# Patient Record
Sex: FEMALE | Race: WHITE | NOT HISPANIC OR LATINO | Employment: UNEMPLOYED | URBAN - METROPOLITAN AREA
[De-identification: names, ages, dates, MRNs, and addresses within clinical notes are randomized per-mention and may not be internally consistent; named-entity substitution may affect disease eponyms.]

---

## 2020-07-08 ENCOUNTER — HOSPITAL ENCOUNTER (OUTPATIENT)
Dept: URGENT CARE | Facility: CLINIC | Age: 20
Discharge: HOME | End: 2020-07-08

## 2020-07-10 LAB — BACTERIA UR CULT: NORMAL

## 2020-07-12 LAB — SARS-COV-2 RNA SPEC QL NAA+PROBE: NOT DETECTED

## 2020-08-15 ENCOUNTER — HOSPITAL ENCOUNTER (OUTPATIENT)
Dept: URGENT CARE | Facility: CLINIC | Age: 20
Discharge: HOME | End: 2020-08-15
Attending: PHYSICIAN ASSISTANT

## 2020-08-31 ENCOUNTER — HOSPITAL ENCOUNTER (OUTPATIENT)
Dept: URGENT CARE | Facility: CLINIC | Age: 20
Discharge: HOME | End: 2020-08-31
Attending: FAMILY MEDICINE

## 2020-09-15 ENCOUNTER — HOSPITAL ENCOUNTER (OUTPATIENT)
Dept: URGENT CARE | Facility: CLINIC | Age: 20
Discharge: HOME | End: 2020-09-15
Attending: FAMILY MEDICINE

## 2020-10-06 ENCOUNTER — HOSPITAL ENCOUNTER (OUTPATIENT)
Dept: URGENT CARE | Facility: CLINIC | Age: 20
Discharge: HOME | End: 2020-10-06

## 2020-10-28 ENCOUNTER — HOSPITAL ENCOUNTER (OUTPATIENT)
Dept: URGENT CARE | Facility: CLINIC | Age: 20
Discharge: HOME | End: 2020-10-28
Attending: FAMILY MEDICINE

## 2020-11-01 LAB — SARS-COV-2 RNA SPEC QL NAA+PROBE: NOT DETECTED

## 2021-02-25 ENCOUNTER — HOSPITAL ENCOUNTER (OUTPATIENT)
Dept: LABOR AND DELIVERY | Facility: HOSPITAL | Age: 21
Discharge: HOME | End: 2021-02-25
Attending: OBSTETRICS & GYNECOLOGY

## 2021-02-25 LAB
APPEARANCE UR: ABNORMAL
BILIRUB UR QL: NEGATIVE
COLOR UR: YELLOW
CONV BACTERIA: ABNORMAL
CONV COLLECTION SOURCE (UA): ABNORMAL
CONV UROBILINOGEN IN URINE BY AUTOMATED TEST STRIP: 0.2 {EHRLICHU}/DL (ref 0.1–1)
GLUCOSE UR QL: NEGATIVE MG/DL
HGB UR QL STRIP: ABNORMAL
KETONES UR QL STRIP: 15 MG/DL
LEUKOCYTE ESTERASE UR QL STRIP: ABNORMAL
NITRITE UR QL STRIP: NEGATIVE
PH UR STRIP.AUTO: 5 [PH] (ref 5–8)
PROT UR QL: NEGATIVE MG/DL
RBC #/AREA URNS HPF: ABNORMAL /[HPF]
SP GR UR: 1.03 (ref 1–1.03)
SQUAMOUS SPT QL MICRO: ABNORMAL /[HPF]
WBC #/AREA URNS HPF: ABNORMAL /[HPF]

## 2021-02-27 LAB — BACTERIA UR CULT: NORMAL

## 2021-04-05 ENCOUNTER — HOSPITAL ENCOUNTER (OUTPATIENT)
Dept: LABOR AND DELIVERY | Facility: HOSPITAL | Age: 21
Discharge: HOME | End: 2021-04-05
Attending: OBSTETRICS & GYNECOLOGY

## 2021-04-05 LAB
APPEARANCE UR: CLEAR
BILIRUB UR QL: NEGATIVE
COLOR UR: YELLOW
CONV BACTERIA: ABNORMAL
CONV COLLECTION SOURCE (UA): ABNORMAL
CONV UROBILINOGEN IN URINE BY AUTOMATED TEST STRIP: 0.2 {EHRLICHU}/DL (ref 0.1–1)
GLUCOSE UR QL: NEGATIVE MG/DL
HGB UR QL STRIP: ABNORMAL
KETONES UR QL STRIP: NEGATIVE MG/DL
LEUKOCYTE ESTERASE UR QL STRIP: ABNORMAL
NITRITE UR QL STRIP: NEGATIVE
PH UR STRIP.AUTO: 6 [PH] (ref 5–8)
PROT UR QL: NEGATIVE MG/DL
RBC #/AREA URNS HPF: ABNORMAL /[HPF]
SP GR UR: 1.01 (ref 1–1.03)
SQUAMOUS SPT QL MICRO: ABNORMAL /[HPF]
WBC #/AREA URNS HPF: ABNORMAL /[HPF]

## 2021-04-06 LAB — BACTERIA UR CULT: NORMAL

## 2021-04-08 ENCOUNTER — HOSPITAL ENCOUNTER (OUTPATIENT)
Dept: URGENT CARE | Facility: CLINIC | Age: 21
Discharge: HOME | End: 2021-04-08
Attending: NURSE PRACTITIONER

## 2021-04-08 ENCOUNTER — HOSPITAL ENCOUNTER (OUTPATIENT)
Dept: LABOR AND DELIVERY | Facility: HOSPITAL | Age: 21
Discharge: HOME | End: 2021-04-08
Attending: OBSTETRICS & GYNECOLOGY

## 2021-04-14 ENCOUNTER — HOSPITAL ENCOUNTER (OUTPATIENT)
Dept: LABOR AND DELIVERY | Facility: HOSPITAL | Age: 21
Discharge: HOME | End: 2021-04-14
Attending: OBSTETRICS & GYNECOLOGY

## 2021-04-14 LAB
ALBUMIN SERPL-MCNC: 3.4 G/DL (ref 3.5–5)
ALBUMIN/GLOB SERPL: 1.1 {RATIO} (ref 1.4–2.6)
ALP SERPL-CCNC: 164 U/L (ref 42–98)
ALT SERPL-CCNC: 18 U/L (ref 10–40)
ANION GAP SERPL CALC-SCNC: 14 MMOL/L (ref 8–19)
AST SERPL-CCNC: 18 U/L (ref 15–50)
BASOPHILS # BLD AUTO: 0.04 10*3/UL (ref 0–0.2)
BASOPHILS NFR BLD AUTO: 0.6 % (ref 0–3)
BILIRUB SERPL-MCNC: <0.15 MG/DL (ref 0.2–1.3)
BUN SERPL-MCNC: 9 MG/DL (ref 5–25)
BUN/CREAT SERPL: 20 {RATIO} (ref 6–20)
CALCIUM SERPL-MCNC: 9.4 MG/DL (ref 8.7–10.4)
CHLORIDE SERPL-SCNC: 104 MMOL/L (ref 99–111)
CONV ABS IMM GRAN: 0.02 10*3/UL (ref 0–0.2)
CONV CO2: 23 MMOL/L (ref 22–32)
CONV CREATININE URINE, RANDOM: 89.5 MG/DL (ref 10–300)
CONV IMMATURE GRAN: 0.3 % (ref 0–1.8)
CONV PROTEIN TO CREATININE RATIO (RANDOM URINE): 0.13 {RATIO} (ref 0–0.1)
CONV TOTAL PROTEIN: 6.4 G/DL (ref 6.3–8.2)
CREAT UR-MCNC: 0.44 MG/DL (ref 0.5–0.9)
DEPRECATED RDW RBC AUTO: 44.6 FL (ref 36.4–46.3)
EOSINOPHIL # BLD AUTO: 0.18 10*3/UL (ref 0–0.7)
EOSINOPHIL # BLD AUTO: 2.7 % (ref 0–7)
ERYTHROCYTE [DISTWIDTH] IN BLOOD BY AUTOMATED COUNT: 13.7 % (ref 11.7–14.4)
GFR SERPLBLD BASED ON 1.73 SQ M-ARVRAT: >60 ML/MIN/{1.73_M2}
GLOBULIN UR ELPH-MCNC: 3 G/DL (ref 2–3.5)
GLUCOSE SERPL-MCNC: 83 MG/DL (ref 65–99)
HCT VFR BLD AUTO: 36.2 % (ref 37–47)
HGB BLD-MCNC: 11.8 G/DL (ref 12–16)
LYMPHOCYTES # BLD AUTO: 1.37 10*3/UL (ref 1–5)
LYMPHOCYTES NFR BLD AUTO: 20.3 % (ref 20–45)
MCH RBC QN AUTO: 29.1 PG (ref 27–31)
MCHC RBC AUTO-ENTMCNC: 32.6 G/DL (ref 33–37)
MCV RBC AUTO: 89.2 FL (ref 81–99)
MONOCYTES # BLD AUTO: 0.69 10*3/UL (ref 0.2–1.2)
MONOCYTES NFR BLD AUTO: 10.2 % (ref 3–10)
NEUTROPHILS # BLD AUTO: 4.46 10*3/UL (ref 2–8)
NEUTROPHILS NFR BLD AUTO: 65.9 % (ref 30–85)
NRBC CBCN: 0 % (ref 0–0.7)
OSMOLALITY SERPL CALC.SUM OF ELEC: 282 MOSM/KG (ref 273–304)
PLATELET # BLD AUTO: 204 10*3/UL (ref 130–400)
PMV BLD AUTO: 11.9 FL (ref 9.4–12.3)
POTASSIUM SERPL-SCNC: 3.9 MMOL/L (ref 3.5–5.3)
PROT UR-MCNC: 11.2 MG/DL
RBC # BLD AUTO: 4.06 10*6/UL (ref 4.2–5.4)
SODIUM SERPL-SCNC: 137 MMOL/L (ref 135–147)
WBC # BLD AUTO: 6.76 10*3/UL (ref 4.8–10.8)

## 2021-04-15 ENCOUNTER — HOSPITAL ENCOUNTER (OUTPATIENT)
Dept: LABOR AND DELIVERY | Facility: HOSPITAL | Age: 21
Discharge: HOME | End: 2021-04-15
Attending: OBSTETRICS & GYNECOLOGY

## 2021-04-15 LAB — CONV ALPHA-1-MICROGLOBULIN PLACENTAL (VAGINAL FLUID): NEGATIVE

## 2021-04-22 ENCOUNTER — HOSPITAL ENCOUNTER (OUTPATIENT)
Dept: LABOR AND DELIVERY | Facility: HOSPITAL | Age: 21
Discharge: HOME | End: 2021-04-22
Attending: OBSTETRICS & GYNECOLOGY

## 2021-04-22 LAB
ALBUMIN SERPL-MCNC: 3.2 G/DL (ref 3.5–5)
ALBUMIN/GLOB SERPL: 1.1 {RATIO} (ref 1.4–2.6)
ALP SERPL-CCNC: 174 U/L (ref 42–98)
ALT SERPL-CCNC: 24 U/L (ref 10–40)
ANION GAP SERPL CALC-SCNC: 16 MMOL/L (ref 8–19)
AST SERPL-CCNC: 27 U/L (ref 15–50)
BASOPHILS # BLD AUTO: 0.03 10*3/UL (ref 0–0.2)
BASOPHILS NFR BLD AUTO: 0.4 % (ref 0–3)
BILIRUB SERPL-MCNC: 0.17 MG/DL (ref 0.2–1.3)
BUN SERPL-MCNC: 9 MG/DL (ref 5–25)
BUN/CREAT SERPL: 19 {RATIO} (ref 6–20)
CALCIUM SERPL-MCNC: 9.5 MG/DL (ref 8.7–10.4)
CHLORIDE SERPL-SCNC: 102 MMOL/L (ref 99–111)
CONV ABS IMM GRAN: 0.02 10*3/UL (ref 0–0.2)
CONV CO2: 20 MMOL/L (ref 22–32)
CONV CREATININE URINE, RANDOM: 114 MG/DL (ref 10–300)
CONV IMMATURE GRAN: 0.3 % (ref 0–1.8)
CONV PROTEIN TO CREATININE RATIO (RANDOM URINE): 0.12 {RATIO} (ref 0–0.1)
CONV TOTAL PROTEIN: 6.1 G/DL (ref 6.3–8.2)
CREAT UR-MCNC: 0.48 MG/DL (ref 0.5–0.9)
DEPRECATED RDW RBC AUTO: 44.7 FL (ref 36.4–46.3)
EOSINOPHIL # BLD AUTO: 0.16 10*3/UL (ref 0–0.7)
EOSINOPHIL # BLD AUTO: 2.4 % (ref 0–7)
ERYTHROCYTE [DISTWIDTH] IN BLOOD BY AUTOMATED COUNT: 14 % (ref 11.7–14.4)
GFR SERPLBLD BASED ON 1.73 SQ M-ARVRAT: >60 ML/MIN/{1.73_M2}
GLOBULIN UR ELPH-MCNC: 2.9 G/DL (ref 2–3.5)
GLUCOSE SERPL-MCNC: 166 MG/DL (ref 65–99)
HCT VFR BLD AUTO: 35 % (ref 37–47)
HGB BLD-MCNC: 11.6 G/DL (ref 12–16)
LYMPHOCYTES # BLD AUTO: 1.02 10*3/UL (ref 1–5)
LYMPHOCYTES NFR BLD AUTO: 15 % (ref 20–45)
MCH RBC QN AUTO: 28.9 PG (ref 27–31)
MCHC RBC AUTO-ENTMCNC: 33.1 G/DL (ref 33–37)
MCV RBC AUTO: 87.1 FL (ref 81–99)
MONOCYTES # BLD AUTO: 0.34 10*3/UL (ref 0.2–1.2)
MONOCYTES NFR BLD AUTO: 5 % (ref 3–10)
NEUTROPHILS # BLD AUTO: 5.21 10*3/UL (ref 2–8)
NEUTROPHILS NFR BLD AUTO: 76.9 % (ref 30–85)
NRBC CBCN: 0 % (ref 0–0.7)
OSMOLALITY SERPL CALC.SUM OF ELEC: 280 MOSM/KG (ref 273–304)
PLATELET # BLD AUTO: 189 10*3/UL (ref 130–400)
PMV BLD AUTO: 11.9 FL (ref 9.4–12.3)
POTASSIUM SERPL-SCNC: 3.9 MMOL/L (ref 3.5–5.3)
PROT UR-MCNC: 13.3 MG/DL
RBC # BLD AUTO: 4.02 10*6/UL (ref 4.2–5.4)
SARS-COV-2 RNA SPEC QL NAA+PROBE: NOT DETECTED
SODIUM SERPL-SCNC: 134 MMOL/L (ref 135–147)
WBC # BLD AUTO: 6.78 10*3/UL (ref 4.8–10.8)

## 2021-06-12 PROCEDURE — 87081 CULTURE SCREEN ONLY: CPT | Performed by: NURSE PRACTITIONER

## 2021-07-04 PROCEDURE — 99283 EMERGENCY DEPT VISIT LOW MDM: CPT

## 2021-07-05 ENCOUNTER — APPOINTMENT (OUTPATIENT)
Dept: GENERAL RADIOLOGY | Facility: HOSPITAL | Age: 21
End: 2021-07-05

## 2021-07-05 ENCOUNTER — HOSPITAL ENCOUNTER (EMERGENCY)
Facility: HOSPITAL | Age: 21
Discharge: HOME | End: 2021-07-05
Attending: EMERGENCY MEDICINE | Admitting: EMERGENCY MEDICINE

## 2021-07-05 VITALS
HEART RATE: 101 BPM | SYSTOLIC BLOOD PRESSURE: 141 MMHG | DIASTOLIC BLOOD PRESSURE: 73 MMHG | BODY MASS INDEX: 35.78 KG/M2 | TEMPERATURE: 99 F | RESPIRATION RATE: 16 BRPM | HEIGHT: 63 IN | OXYGEN SATURATION: 100 %

## 2021-07-05 DIAGNOSIS — S93.402A SPRAIN OF LEFT ANKLE, UNSPECIFIED LIGAMENT, INITIAL ENCOUNTER: Primary | ICD-10-CM

## 2021-07-05 PROCEDURE — 73610 X-RAY EXAM OF ANKLE: CPT

## 2021-07-05 RX ORDER — IBUPROFEN 800 MG/1
800 TABLET ORAL EVERY 8 HOURS PRN
Qty: 20 TABLET | Refills: 0 | Status: SHIPPED | OUTPATIENT
Start: 2021-07-05 | End: 2021-08-04

## 2021-07-05 RX ORDER — TRAMADOL HYDROCHLORIDE 50 MG/1
50 TABLET ORAL ONCE
Status: COMPLETED | OUTPATIENT
Start: 2021-07-05 | End: 2021-07-05

## 2021-07-05 RX ADMIN — TRAMADOL HYDROCHLORIDE 50 MG: 50 TABLET, FILM COATED ORAL at 02:08

## 2021-07-05 NOTE — DISCHARGE INSTRUCTIONS
Rest.  Ice.  Elevate.    No weightbearing for the next 48 hours.    Wear walking boot for ambulation and use crutches after that time.  If no better follow-up with PCP for reevaluation and possible orthopedic consultation
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
## 2021-09-14 PROCEDURE — 87086 URINE CULTURE/COLONY COUNT: CPT | Performed by: FAMILY MEDICINE

## 2021-09-16 ENCOUNTER — TELEPHONE (OUTPATIENT)
Dept: OTHER | Facility: OTHER | Age: 21
End: 2021-09-16

## 2021-09-16 NOTE — TELEPHONE ENCOUNTER
----- Message from SAKINA Davis sent at 9/15/2021 10:55 PM EDT -----  Please call the patient regarding her normal result.    Patient's urine culture is negative.

## 2021-10-06 ENCOUNTER — APPOINTMENT (OUTPATIENT)
Dept: GENERAL RADIOLOGY | Facility: HOSPITAL | Age: 21
End: 2021-10-06

## 2021-10-06 ENCOUNTER — HOSPITAL ENCOUNTER (EMERGENCY)
Facility: HOSPITAL | Age: 21
Discharge: HOME | End: 2021-10-06
Attending: EMERGENCY MEDICINE | Admitting: EMERGENCY MEDICINE

## 2021-10-06 VITALS
RESPIRATION RATE: 18 BRPM | BODY MASS INDEX: 39.38 KG/M2 | SYSTOLIC BLOOD PRESSURE: 141 MMHG | HEART RATE: 100 BPM | DIASTOLIC BLOOD PRESSURE: 100 MMHG | TEMPERATURE: 98.1 F | WEIGHT: 222.22 LBS | HEIGHT: 63 IN | OXYGEN SATURATION: 97 %

## 2021-10-06 DIAGNOSIS — S80.01XA CONTUSION OF RIGHT KNEE, INITIAL ENCOUNTER: ICD-10-CM

## 2021-10-06 DIAGNOSIS — S80.211A ABRASION OF RIGHT KNEE, INITIAL ENCOUNTER: ICD-10-CM

## 2021-10-06 DIAGNOSIS — S90.31XA CONTUSION OF RIGHT FOOT, INITIAL ENCOUNTER: Primary | ICD-10-CM

## 2021-10-06 PROCEDURE — 99282 EMERGENCY DEPT VISIT SF MDM: CPT

## 2021-10-06 PROCEDURE — 73562 X-RAY EXAM OF KNEE 3: CPT

## 2021-10-06 PROCEDURE — 99281 EMR DPT VST MAYX REQ PHY/QHP: CPT

## 2021-10-06 PROCEDURE — 73630 X-RAY EXAM OF FOOT: CPT

## 2021-10-06 NOTE — ED PROVIDER NOTES
Subjective   Pt reports she got her right foot caught in a pallet at work and fell landing on her knees.       History provided by:  Patient  Foot Injury  Location:  Foot and knee  Time since incident:  3 hours  Injury: yes    Knee location:  R knee  Foot location:  R foot and dorsum of R foot  Pain details:     Quality:  Aching    Radiates to:  Does not radiate    Severity:  Mild    Onset quality:  Sudden    Duration:  3 hours    Timing:  Constant    Progression:  Improving  Chronicity:  New  Relieved by:  Nothing  Worsened by:  Flexion  Ineffective treatments:  None tried  Associated symptoms: swelling    Associated symptoms: no back pain, no decreased ROM, no fatigue, no fever, no itching, no muscle weakness, no neck pain, no numbness, no stiffness and no tingling    Risk factors: no concern for non-accidental trauma        Review of Systems   Constitutional: Negative for chills, fatigue and fever.   HENT: Negative for congestion, ear pain and sore throat.    Eyes: Negative for pain.   Respiratory: Negative for cough, chest tightness and shortness of breath.    Cardiovascular: Negative for chest pain.   Gastrointestinal: Negative for abdominal pain, diarrhea, nausea and vomiting.   Genitourinary: Negative for flank pain and hematuria.   Musculoskeletal: Negative for back pain, joint swelling, neck pain and stiffness.   Skin: Negative for itching and pallor.   Neurological: Negative for seizures and headaches.   All other systems reviewed and are negative.      Past Medical History:   Diagnosis Date   • Asthma     SEASONAL       No Known Allergies    History reviewed. No pertinent surgical history.    Family History   Problem Relation Age of Onset   • Diabetes Mother        Social History     Socioeconomic History   • Marital status: Single     Spouse name: Not on file   • Number of children: Not on file   • Years of education: Not on file   • Highest education level: Not on file   Tobacco Use   • Smoking status:  Never Smoker   • Smokeless tobacco: Never Used   Vaping Use   • Vaping Use: Never used   Substance and Sexual Activity   • Alcohol use: Not Currently   • Drug use: Never           Objective   Physical Exam  Vitals and nursing note reviewed.   Constitutional:       General: She is not in acute distress.     Appearance: Normal appearance. She is not toxic-appearing.   HENT:      Head: Normocephalic and atraumatic.      Mouth/Throat:      Mouth: Mucous membranes are moist.   Eyes:      Extraocular Movements: Extraocular movements intact.      Pupils: Pupils are equal, round, and reactive to light.   Cardiovascular:      Rate and Rhythm: Normal rate and regular rhythm.      Pulses: Normal pulses.           Dorsalis pedis pulses are 2+ on the right side.        Posterior tibial pulses are 2+ on the right side.      Heart sounds: Normal heart sounds.   Pulmonary:      Effort: Pulmonary effort is normal. No respiratory distress.      Breath sounds: Normal breath sounds.   Abdominal:      General: Abdomen is flat.      Palpations: Abdomen is soft.      Tenderness: There is no abdominal tenderness.   Musculoskeletal:         General: Normal range of motion.      Cervical back: Normal range of motion and neck supple.      Right knee: No swelling or deformity. Normal range of motion. Tenderness present. Normal pulse.      Right foot: No deformity.        Legs:         Feet:    Feet:      Right foot:      Skin integrity: Skin integrity normal.   Skin:     General: Skin is warm and dry.   Neurological:      Mental Status: She is alert and oriented to person, place, and time. Mental status is at baseline.         Procedures           ED Course                                           MDM  Number of Diagnoses or Management Options  Abrasion of right knee, initial encounter: new and requires workup  Contusion of right foot, initial encounter: new and requires workup  Contusion of right knee, initial encounter: new and requires  workup  Diagnosis management comments: I have spoken with the patient. I have explained the patient´s condition, diagnoses and treatment plan based on the information available to me at this time. I have answered the patient's questions and addressed any concerns. The patient has a good  understanding of the patient´s diagnosis, condition, and treatment plan as can be expected at this point. The vital signs have been stable. The patient´s condition is stable and appropriate for discharge from the emergency department.      The patient will pursue further outpatient evaluation with the primary care physician or other designated or consulting physician as outlined in the discharge instructions. They are agreeable to this plan of care and follow-up instructions have been explained in detail. The patient has received these instructions in written format and have expressed an understanding of the discharge instructions. The patient is aware that any significant change in condition or worsening of symptoms should prompt an immediate return to this or the closest emergency department or call to 911.       Amount and/or Complexity of Data Reviewed  Tests in the radiology section of CPT®: reviewed and ordered    Risk of Complications, Morbidity, and/or Mortality  Presenting problems: minimal  Diagnostic procedures: minimal  Management options: minimal    Patient Progress  Patient progress: stable      Final diagnoses:   Contusion of right foot, initial encounter   Abrasion of right knee, initial encounter   Contusion of right knee, initial encounter       ED Disposition  ED Disposition     ED Disposition Condition Comment    Discharge Stable           Provider, No Known  [facility]  [place] [id number]    In 3 days  As needed         Medication List      No changes were made to your prescriptions during this visit.          Josep Bro, APRN  10/06/21 0401

## 2021-10-08 ENCOUNTER — TRANSCRIBE ORDERS (OUTPATIENT)
Dept: ADMINISTRATIVE | Facility: HOSPITAL | Age: 21
End: 2021-10-08

## 2021-10-08 ENCOUNTER — LAB (OUTPATIENT)
Dept: LAB | Facility: HOSPITAL | Age: 21
End: 2021-10-08

## 2021-10-08 DIAGNOSIS — Z01.84 IMMUNITY STATUS TESTING: ICD-10-CM

## 2021-10-08 DIAGNOSIS — Z01.84 IMMUNITY STATUS TESTING: Primary | ICD-10-CM

## 2021-10-08 PROCEDURE — 86765 RUBEOLA ANTIBODY: CPT

## 2021-10-08 PROCEDURE — 36415 COLL VENOUS BLD VENIPUNCTURE: CPT

## 2021-10-08 PROCEDURE — 86787 VARICELLA-ZOSTER ANTIBODY: CPT

## 2021-10-08 PROCEDURE — 87517 HEPATITIS B DNA QUANT: CPT

## 2021-10-08 PROCEDURE — 86762 RUBELLA ANTIBODY: CPT

## 2021-10-08 PROCEDURE — 86735 MUMPS ANTIBODY: CPT

## 2021-10-09 LAB
HBV DNA SERPL NAA+PROBE-ACNC: NORMAL IU/ML
HBV DNA SERPL NAA+PROBE-LOG IU: NORMAL {LOG_IU}/ML
MEV IGG SER IA-ACNC: 19.8 AU/ML
MUV IGG SER IA-ACNC: 20.6 AU/ML
REF LAB TEST REF RANGE: NORMAL
RUBV IGG SERPL IA-ACNC: 1.49 INDEX
VZV IGG SER IA-ACNC: 465 INDEX

## 2021-12-20 ENCOUNTER — INITIAL PRENATAL (OUTPATIENT)
Dept: OBSTETRICS AND GYNECOLOGY | Facility: CLINIC | Age: 21
End: 2021-12-20

## 2021-12-20 VITALS
DIASTOLIC BLOOD PRESSURE: 77 MMHG | WEIGHT: 225 LBS | BODY MASS INDEX: 37.49 KG/M2 | HEIGHT: 65 IN | SYSTOLIC BLOOD PRESSURE: 116 MMHG

## 2021-12-20 DIAGNOSIS — O99.210 OBESITY AFFECTING PREGNANCY, ANTEPARTUM: ICD-10-CM

## 2021-12-20 DIAGNOSIS — Z34.80 SUPERVISION OF OTHER NORMAL PREGNANCY: Primary | ICD-10-CM

## 2021-12-20 PROBLEM — J45.909 ASTHMA: Status: ACTIVE | Noted: 2021-12-20

## 2021-12-20 PROBLEM — F41.1 GENERALIZED ANXIETY DISORDER: Status: ACTIVE | Noted: 2021-12-20

## 2021-12-20 PROBLEM — E66.9 OBESITY: Status: ACTIVE | Noted: 2021-12-20

## 2021-12-20 LAB
ABO GROUP BLD: NORMAL
ALBUMIN SERPL-MCNC: 4.2 G/DL (ref 3.5–5.2)
ALBUMIN/GLOB SERPL: 1.8 G/DL
ALP SERPL-CCNC: 89 U/L (ref 39–117)
ALT SERPL W P-5'-P-CCNC: 16 U/L (ref 1–33)
ANION GAP SERPL CALCULATED.3IONS-SCNC: 8.1 MMOL/L (ref 5–15)
AST SERPL-CCNC: 17 U/L (ref 1–32)
B-HCG UR QL: POSITIVE
BASOPHILS # BLD AUTO: 0.02 10*3/MM3 (ref 0–0.2)
BASOPHILS NFR BLD AUTO: 0.5 % (ref 0–1.5)
BILIRUB SERPL-MCNC: <0.2 MG/DL (ref 0–1.2)
BLD GP AB SCN SERPL QL: NEGATIVE
BUN SERPL-MCNC: 6 MG/DL (ref 6–20)
BUN/CREAT SERPL: 15.4 (ref 7–25)
C TRACH RRNA SPEC DONR QL NAA+PROBE: NEGATIVE
CALCIUM SPEC-SCNC: 9 MG/DL (ref 8.6–10.5)
CHLORIDE SERPL-SCNC: 101 MMOL/L (ref 98–107)
CO2 SERPL-SCNC: 27.9 MMOL/L (ref 22–29)
CREAT SERPL-MCNC: 0.39 MG/DL (ref 0.57–1)
DEPRECATED RDW RBC AUTO: 47.5 FL (ref 37–54)
EOSINOPHIL # BLD AUTO: 0.13 10*3/MM3 (ref 0–0.4)
EOSINOPHIL NFR BLD AUTO: 3 % (ref 0.3–6.2)
ERYTHROCYTE [DISTWIDTH] IN BLOOD BY AUTOMATED COUNT: 14.7 % (ref 12.3–15.4)
EXPIRATION DATE: ABNORMAL
GFR SERPL CREATININE-BSD FRML MDRD: >150 ML/MIN/1.73
GLOBULIN UR ELPH-MCNC: 2.3 GM/DL
GLUCOSE SERPL-MCNC: 77 MG/DL (ref 65–99)
GLUCOSE UR STRIP-MCNC: NEGATIVE MG/DL
HBV SURFACE AG SERPL QL IA: NORMAL
HCT VFR BLD AUTO: 40 % (ref 34–46.6)
HCV AB SER DONR QL: NORMAL
HGB BLD-MCNC: 12.8 G/DL (ref 12–15.9)
HIV1+2 AB SER QL: NORMAL
IMM GRANULOCYTES # BLD AUTO: 0.01 10*3/MM3 (ref 0–0.05)
IMM GRANULOCYTES NFR BLD AUTO: 0.2 % (ref 0–0.5)
INTERNAL NEGATIVE CONTROL: ABNORMAL
INTERNAL POSITIVE CONTROL: ABNORMAL
LYMPHOCYTES # BLD AUTO: 0.75 10*3/MM3 (ref 0.7–3.1)
LYMPHOCYTES NFR BLD AUTO: 17.4 % (ref 19.6–45.3)
Lab: ABNORMAL
MCH RBC QN AUTO: 28.2 PG (ref 26.6–33)
MCHC RBC AUTO-ENTMCNC: 32 G/DL (ref 31.5–35.7)
MCV RBC AUTO: 88.1 FL (ref 79–97)
MONOCYTES # BLD AUTO: 0.5 10*3/MM3 (ref 0.1–0.9)
MONOCYTES NFR BLD AUTO: 11.6 % (ref 5–12)
N GONORRHOEA DNA SPEC QL NAA+PROBE: NEGATIVE
NEUTROPHILS NFR BLD AUTO: 2.9 10*3/MM3 (ref 1.7–7)
NEUTROPHILS NFR BLD AUTO: 67.3 % (ref 42.7–76)
NRBC BLD AUTO-RTO: 0 /100 WBC (ref 0–0.2)
PLATELET # BLD AUTO: 248 10*3/MM3 (ref 140–450)
PMV BLD AUTO: 11.7 FL (ref 6–12)
POTASSIUM SERPL-SCNC: 4.1 MMOL/L (ref 3.5–5.2)
PROT SERPL-MCNC: 6.5 G/DL (ref 6–8.5)
PROT UR STRIP-MCNC: NEGATIVE MG/DL
RBC # BLD AUTO: 4.54 10*6/MM3 (ref 3.77–5.28)
RH BLD: POSITIVE
SODIUM SERPL-SCNC: 137 MMOL/L (ref 136–145)
T4 FREE SERPL-MCNC: 0.94 NG/DL (ref 0.93–1.7)
TSH SERPL DL<=0.05 MIU/L-ACNC: 1.8 UIU/ML (ref 0.27–4.2)
WBC NRBC COR # BLD: 4.31 10*3/MM3 (ref 3.4–10.8)

## 2021-12-20 PROCEDURE — 86803 HEPATITIS C AB TEST: CPT | Performed by: OBSTETRICS & GYNECOLOGY

## 2021-12-20 PROCEDURE — 83020 HEMOGLOBIN ELECTROPHORESIS: CPT | Performed by: OBSTETRICS & GYNECOLOGY

## 2021-12-20 PROCEDURE — 84443 ASSAY THYROID STIM HORMONE: CPT | Performed by: OBSTETRICS & GYNECOLOGY

## 2021-12-20 PROCEDURE — 87591 N.GONORRHOEAE DNA AMP PROB: CPT | Performed by: OBSTETRICS & GYNECOLOGY

## 2021-12-20 PROCEDURE — 84439 ASSAY OF FREE THYROXINE: CPT | Performed by: OBSTETRICS & GYNECOLOGY

## 2021-12-20 PROCEDURE — 81025 URINE PREGNANCY TEST: CPT | Performed by: OBSTETRICS & GYNECOLOGY

## 2021-12-20 PROCEDURE — 80053 COMPREHEN METABOLIC PANEL: CPT | Performed by: OBSTETRICS & GYNECOLOGY

## 2021-12-20 PROCEDURE — 87661 TRICHOMONAS VAGINALIS AMPLIF: CPT | Performed by: OBSTETRICS & GYNECOLOGY

## 2021-12-20 PROCEDURE — 87086 URINE CULTURE/COLONY COUNT: CPT | Performed by: OBSTETRICS & GYNECOLOGY

## 2021-12-20 PROCEDURE — G0123 SCREEN CERV/VAG THIN LAYER: HCPCS | Performed by: OBSTETRICS & GYNECOLOGY

## 2021-12-20 PROCEDURE — 99204 OFFICE O/P NEW MOD 45 MIN: CPT | Performed by: OBSTETRICS & GYNECOLOGY

## 2021-12-20 PROCEDURE — 87491 CHLMYD TRACH DNA AMP PROBE: CPT | Performed by: OBSTETRICS & GYNECOLOGY

## 2021-12-20 PROCEDURE — G0432 EIA HIV-1/HIV-2 SCREEN: HCPCS | Performed by: OBSTETRICS & GYNECOLOGY

## 2021-12-21 LAB — RPR SER QL: NORMAL

## 2021-12-22 LAB
BACTERIA UR CULT: NO GROWTH
BACTERIA UR CULT: NORMAL
HGB A MFR BLD ELPH: 97.8 % (ref 96.4–98.8)
HGB A2 MFR BLD ELPH: 2.2 % (ref 1.8–3.2)
HGB F MFR BLD ELPH: 0 % (ref 0–2)
HGB FRACT BLD-IMP: NORMAL
HGB S MFR BLD ELPH: 0 %
RUBV IGG SERPL IA-ACNC: 1.32 INDEX

## 2021-12-23 LAB
C TRACH RRNA CVX QL NAA+PROBE: NEGATIVE
CONV .: NORMAL
CYTOLOGIST CVX/VAG CYTO: NORMAL
CYTOLOGY CVX/VAG DOC CYTO: NORMAL
CYTOLOGY CVX/VAG DOC THIN PREP: NORMAL
DX ICD CODE: NORMAL
HIV 1 & 2 AB SER-IMP: NORMAL
N GONORRHOEA RRNA CVX QL NAA+PROBE: NEGATIVE
OTHER STN SPEC: NORMAL
STAT OF ADQ CVX/VAG CYTO-IMP: NORMAL
T VAGINALIS RRNA SPEC QL NAA+PROBE: NEGATIVE

## 2021-12-26 PROBLEM — O99.340 ANXIETY DISORDER AFFECTING PREGNANCY, ANTEPARTUM: Status: ACTIVE | Noted: 2021-12-26

## 2021-12-26 PROBLEM — O99.210 OBESITY AFFECTING PREGNANCY, ANTEPARTUM: Status: ACTIVE | Noted: 2021-12-26

## 2021-12-26 PROBLEM — F41.9 ANXIETY DISORDER AFFECTING PREGNANCY, ANTEPARTUM: Status: ACTIVE | Noted: 2021-12-26

## 2021-12-26 PROBLEM — J45.909 ASTHMA AFFECTING PREGNANCY, ANTEPARTUM: Status: ACTIVE | Noted: 2021-12-26

## 2021-12-26 PROBLEM — O99.519 ASTHMA AFFECTING PREGNANCY, ANTEPARTUM: Status: ACTIVE | Noted: 2021-12-26

## 2021-12-27 NOTE — ASSESSMENT & PLAN NOTE
Continue prenatal vitamins  Check prenatal labs  Check dating ultrasound  Discussed call schedule an office visit schedule  Discussed medications safe in pregnancy  Discussed nutrition and exercise in pregnancy  Patient is considering prenatal genetic screening  Recommend the patient complete her Covid vaccination series  Recommended flu vaccine
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
## 2021-12-27 NOTE — ASSESSMENT & PLAN NOTE
Patient symptoms are stable.  She is currently not on any medications.  We will continue to monitor.

## 2022-01-12 ENCOUNTER — TELEPHONE (OUTPATIENT)
Dept: LABOR AND DELIVERY | Facility: HOSPITAL | Age: 22
End: 2022-01-12

## 2022-01-18 ENCOUNTER — ROUTINE PRENATAL (OUTPATIENT)
Dept: OBSTETRICS AND GYNECOLOGY | Facility: CLINIC | Age: 22
End: 2022-01-18

## 2022-01-18 VITALS — BODY MASS INDEX: 37.86 KG/M2 | WEIGHT: 224 LBS | DIASTOLIC BLOOD PRESSURE: 77 MMHG | SYSTOLIC BLOOD PRESSURE: 124 MMHG

## 2022-01-18 DIAGNOSIS — O99.340 ANXIETY DISORDER AFFECTING PREGNANCY, ANTEPARTUM: ICD-10-CM

## 2022-01-18 DIAGNOSIS — F41.9 ANXIETY DISORDER AFFECTING PREGNANCY, ANTEPARTUM: ICD-10-CM

## 2022-01-18 DIAGNOSIS — Z34.80 SUPERVISION OF OTHER NORMAL PREGNANCY: Primary | ICD-10-CM

## 2022-01-18 DIAGNOSIS — F32.A DEPRESSION DURING PREGNANCY, ANTEPARTUM: ICD-10-CM

## 2022-01-18 DIAGNOSIS — O21.9 VOMITING OR NAUSEA OF PREGNANCY: ICD-10-CM

## 2022-01-18 DIAGNOSIS — O99.340 DEPRESSION DURING PREGNANCY, ANTEPARTUM: ICD-10-CM

## 2022-01-18 LAB
EXTERNAL NIPT: NEGATIVE
GLUCOSE UR STRIP-MCNC: NEGATIVE MG/DL
PROT UR STRIP-MCNC: NEGATIVE MG/DL

## 2022-01-18 PROCEDURE — 99214 OFFICE O/P EST MOD 30 MIN: CPT | Performed by: OBSTETRICS & GYNECOLOGY

## 2022-01-18 RX ORDER — CHLORAL HYDRATE 500 MG
CAPSULE ORAL EVERY 24 HOURS
COMMUNITY
End: 2022-06-07

## 2022-01-18 RX ORDER — SWAB
SWAB, NON-MEDICATED MISCELLANEOUS EVERY 24 HOURS
COMMUNITY
End: 2022-08-23

## 2022-01-18 RX ORDER — MULTIVIT WITH MINERALS/LUTEIN
TABLET ORAL EVERY 24 HOURS
COMMUNITY
End: 2022-08-23

## 2022-01-18 RX ORDER — B-COMPLEX WITH VITAMIN C
TABLET ORAL
COMMUNITY
End: 2022-06-07

## 2022-01-18 RX ORDER — ONDANSETRON 4 MG/1
4 TABLET, ORALLY DISINTEGRATING ORAL EVERY 8 HOURS PRN
Qty: 30 TABLET | Refills: 3 | Status: SHIPPED | OUTPATIENT
Start: 2022-01-18 | End: 2022-03-23

## 2022-01-18 RX ORDER — ALBUTEROL SULFATE 90 UG/1
AEROSOL, METERED RESPIRATORY (INHALATION)
COMMUNITY
Start: 2021-12-23 | End: 2022-08-23

## 2022-01-18 RX ORDER — ALBUTEROL SULFATE 2.5 MG/3ML
SOLUTION RESPIRATORY (INHALATION)
COMMUNITY
Start: 2021-12-23 | End: 2022-06-07 | Stop reason: SDUPTHER

## 2022-01-18 RX ORDER — ECHINACEA 400 MG
CAPSULE ORAL EVERY 12 HOURS SCHEDULED
COMMUNITY
End: 2022-06-07

## 2022-01-18 NOTE — PROGRESS NOTES
OB FOLLOW UP    CC: Scheduled OB routine FU       Prenatal care complicated by:   Patient Active Problem List   Diagnosis   • Asthma   • Generalized anxiety disorder   • Obesity   • Supervision of other normal pregnancy   • Obesity affecting pregnancy, antepartum   • Anxiety disorder affecting pregnancy, antepartum   • Asthma affecting pregnancy, antepartum   • COVID-19       Subjective:   Patient has: No complaints, No leaking fluid, No vaginal bleeding, No contractions  No fetal movement yet    Objective:  Urine glucose/protein- see flow sheet      /77   Wt 102 kg (224 lb)   LMP  (LMP Unknown)   BMI 37.86 kg/m²   See OB flow for LE edema, and cvx exam if applicable  FHT: 155 BPM   Uterine Size: size equals dates    Ultrasound 1/5/2022 reviewed with patient    Assessment and Plan:  Diagnoses and all orders for this visit:    1. Supervision of other normal pregnancy (Primary)  Overview:  EDC: 7/10/2022    Nips:    Asthma  Obesity    COVID-19 vaccine: Status post dose 1  Flu vaccine: Recommended  Tdap vaccine:    Assessment & Plan:  Reviewed dating ultrasound and finalized EDC.  Reviewed prenatal labs  Continue prenatal vitamins  Desires nips testing    Orders:  -     POC Urinalysis Dipstick  -     US Ob Detail Fetal Anatomy Single or First Gestation; Future  -     INVITAE NIPS    2. Depression during pregnancy, antepartum  -     sertraline (Zoloft) 50 MG tablet; Take 1 tablet by mouth Daily.  Dispense: 30 tablet; Refill: 11    3. Vomiting or nausea of pregnancy  -     ondansetron ODT (Zofran ODT) 4 MG disintegrating tablet; Place 1 tablet on the tongue Every 8 (Eight) Hours As Needed for Nausea or Vomiting.  Dispense: 30 tablet; Refill: 3    4. Anxiety disorder affecting pregnancy, antepartum  Overview:  Zoloft 50 mg daily    Assessment & Plan:  Symptoms are stable.  Continue Zoloft          15w2d  Reassuring pregnancy progress    Counseling: Second trimester precautions    Questions answered    Return in  about 5 weeks (around 2/22/2022) for Recheck.      Jluis Aragon MD  01/18/2022

## 2022-01-20 PROBLEM — U07.1 COVID-19: Status: ACTIVE | Noted: 2022-01-20

## 2022-01-21 NOTE — ASSESSMENT & PLAN NOTE
Reviewed dating ultrasound and finalized EDC.  Reviewed prenatal labs  Continue prenatal vitamins  Desires nips testing

## 2022-01-26 ENCOUNTER — TELEPHONE (OUTPATIENT)
Dept: OBSTETRICS AND GYNECOLOGY | Facility: CLINIC | Age: 22
End: 2022-01-26

## 2022-01-26 NOTE — TELEPHONE ENCOUNTER
----- Message from Jluis Aragon MD sent at 1/25/2022  9:55 PM EST -----  Notify patient of negative result and if desires male gender

## 2022-01-28 ENCOUNTER — TELEPHONE (OUTPATIENT)
Dept: OBSTETRICS AND GYNECOLOGY | Facility: CLINIC | Age: 22
End: 2022-01-28

## 2022-02-23 ENCOUNTER — ROUTINE PRENATAL (OUTPATIENT)
Dept: OBSTETRICS AND GYNECOLOGY | Facility: CLINIC | Age: 22
End: 2022-02-23

## 2022-02-23 VITALS — SYSTOLIC BLOOD PRESSURE: 115 MMHG | DIASTOLIC BLOOD PRESSURE: 64 MMHG | WEIGHT: 224.6 LBS | BODY MASS INDEX: 37.96 KG/M2

## 2022-02-23 DIAGNOSIS — J45.909 ASTHMA AFFECTING PREGNANCY, ANTEPARTUM: ICD-10-CM

## 2022-02-23 DIAGNOSIS — Z34.80 SUPERVISION OF OTHER NORMAL PREGNANCY: Primary | ICD-10-CM

## 2022-02-23 DIAGNOSIS — O99.210 OBESITY AFFECTING PREGNANCY, ANTEPARTUM: ICD-10-CM

## 2022-02-23 DIAGNOSIS — O99.519 ASTHMA AFFECTING PREGNANCY, ANTEPARTUM: ICD-10-CM

## 2022-02-23 DIAGNOSIS — F41.9 ANXIETY DISORDER AFFECTING PREGNANCY, ANTEPARTUM: ICD-10-CM

## 2022-02-23 DIAGNOSIS — O99.340 ANXIETY DISORDER AFFECTING PREGNANCY, ANTEPARTUM: ICD-10-CM

## 2022-02-23 LAB
GLUCOSE UR STRIP-MCNC: NEGATIVE MG/DL
PROT UR STRIP-MCNC: ABNORMAL MG/DL

## 2022-02-23 PROCEDURE — 99214 OFFICE O/P EST MOD 30 MIN: CPT | Performed by: OBSTETRICS & GYNECOLOGY

## 2022-03-05 NOTE — ASSESSMENT & PLAN NOTE
Reviewed today's anatomy ultrasound with patient.  No anatomical abnormalities were noted and the study was complete.  Continue prenatal vitamins  1 hour GTT next office visit

## 2022-03-23 ENCOUNTER — ROUTINE PRENATAL (OUTPATIENT)
Dept: OBSTETRICS AND GYNECOLOGY | Facility: CLINIC | Age: 22
End: 2022-03-23

## 2022-03-23 VITALS — BODY MASS INDEX: 37.86 KG/M2 | DIASTOLIC BLOOD PRESSURE: 74 MMHG | SYSTOLIC BLOOD PRESSURE: 118 MMHG | WEIGHT: 224 LBS

## 2022-03-23 DIAGNOSIS — J45.909 ASTHMA AFFECTING PREGNANCY, ANTEPARTUM: ICD-10-CM

## 2022-03-23 DIAGNOSIS — O99.519 ASTHMA AFFECTING PREGNANCY, ANTEPARTUM: ICD-10-CM

## 2022-03-23 DIAGNOSIS — O99.210 OBESITY AFFECTING PREGNANCY, ANTEPARTUM: ICD-10-CM

## 2022-03-23 DIAGNOSIS — R42 POSTURAL DIZZINESS WITH PRESYNCOPE: ICD-10-CM

## 2022-03-23 DIAGNOSIS — Z34.80 SUPERVISION OF OTHER NORMAL PREGNANCY: Primary | ICD-10-CM

## 2022-03-23 DIAGNOSIS — R55 POSTURAL DIZZINESS WITH PRESYNCOPE: ICD-10-CM

## 2022-03-23 PROBLEM — U07.1 COVID-19: Status: RESOLVED | Noted: 2022-01-20 | Resolved: 2022-03-23

## 2022-03-23 LAB
DEPRECATED RDW RBC AUTO: 47.8 FL (ref 37–54)
ERYTHROCYTE [DISTWIDTH] IN BLOOD BY AUTOMATED COUNT: 15 % (ref 12.3–15.4)
FERRITIN SERPL-MCNC: 11.7 NG/ML (ref 13–150)
GLUCOSE 1H P GLC SERPL-MCNC: 105 MG/DL (ref 65–139)
GLUCOSE UR STRIP-MCNC: NEGATIVE MG/DL
HCT VFR BLD AUTO: 36.6 % (ref 34–46.6)
HGB BLD-MCNC: 11.8 G/DL (ref 12–15.9)
IRON 24H UR-MRATE: 36 MCG/DL (ref 37–145)
IRON SATN MFR SERPL: 9 % (ref 20–50)
MCH RBC QN AUTO: 28.7 PG (ref 26.6–33)
MCHC RBC AUTO-ENTMCNC: 32.2 G/DL (ref 31.5–35.7)
MCV RBC AUTO: 89.1 FL (ref 79–97)
PLATELET # BLD AUTO: 209 10*3/MM3 (ref 140–450)
PMV BLD AUTO: 12.1 FL (ref 6–12)
PROT UR STRIP-MCNC: ABNORMAL MG/DL
RBC # BLD AUTO: 4.11 10*6/MM3 (ref 3.77–5.28)
RETICS # AUTO: 0.08 10*6/MM3 (ref 0.02–0.13)
RETICS/RBC NFR AUTO: 1.94 % (ref 0.7–1.9)
TIBC SERPL-MCNC: 399 MCG/DL (ref 298–536)
TRANSFERRIN SERPL-MCNC: 268 MG/DL (ref 200–360)
WBC NRBC COR # BLD: 7.22 10*3/MM3 (ref 3.4–10.8)

## 2022-03-23 PROCEDURE — 83540 ASSAY OF IRON: CPT | Performed by: OBSTETRICS & GYNECOLOGY

## 2022-03-23 PROCEDURE — 82950 GLUCOSE TEST: CPT | Performed by: OBSTETRICS & GYNECOLOGY

## 2022-03-23 PROCEDURE — 85027 COMPLETE CBC AUTOMATED: CPT | Performed by: OBSTETRICS & GYNECOLOGY

## 2022-03-23 PROCEDURE — 85045 AUTOMATED RETICULOCYTE COUNT: CPT | Performed by: OBSTETRICS & GYNECOLOGY

## 2022-03-23 PROCEDURE — 84466 ASSAY OF TRANSFERRIN: CPT | Performed by: OBSTETRICS & GYNECOLOGY

## 2022-03-23 PROCEDURE — 99214 OFFICE O/P EST MOD 30 MIN: CPT | Performed by: OBSTETRICS & GYNECOLOGY

## 2022-03-23 PROCEDURE — 82728 ASSAY OF FERRITIN: CPT | Performed by: OBSTETRICS & GYNECOLOGY

## 2022-03-23 NOTE — ASSESSMENT & PLAN NOTE
1 hour GTT today  Continue prenatal vitamins  Fetal kick counts   labor warnings  
Discussed exercise, nutrition and appropriate weight gain in pregnancy  
Suspect the patient's symptoms are likely pregnancy related due to blood volume redistribution to the placenta.  Plan to screen for anemia and iron deficiency.  
Symptoms are stable.  Continue albuterol MDI as needed    
Statement Selected

## 2022-03-23 NOTE — PROGRESS NOTES
OB FOLLOW UP    CC: Scheduled OB routine FU     Prenatal care complicated by:   Patient Active Problem List   Diagnosis   • Asthma   • Generalized anxiety disorder   • Obesity   • Supervision of other normal pregnancy   • Obesity affecting pregnancy, antepartum   • Anxiety disorder affecting pregnancy, antepartum   • Asthma affecting pregnancy, antepartum   • Postural dizziness with presyncope       Subjective:   Patient has: No leaking fluid, No vaginal bleeding, No contractions, Adequate FM  Complains of some occasional lightheadedness and flushing.    Objective:  Urine glucose/protein- see flow sheet      /74   Wt 102 kg (224 lb)   LMP  (LMP Unknown)   BMI 37.86 kg/m²   See OB flow for LE edema, and cvx exam if applicable  FHT: 148 BPM   Uterine Size: 24 cm      Assessment and Plan:  Diagnoses and all orders for this visit:    1. Supervision of other normal pregnancy (Primary)  Overview:  EDC: 7/10/2022    Nips: Negative    Asthma  Obesity  Anxiety    COVID-19 vaccine: Complete  Flu vaccine: Recommended  Tdap vaccine:    Assessment & Plan:  1 hour GTT today  Continue prenatal vitamins  Fetal kick counts   labor warnings    Orders:  -     POC Urinalysis Dipstick  -     Gestational Diabetes Screen 1 Hour  -     CBC (No Diff)    2. Postural dizziness with presyncope  Assessment & Plan:  Suspect the patient's symptoms are likely pregnancy related due to blood volume redistribution to the placenta.  Plan to screen for anemia and iron deficiency.    Orders:  -     Ferritin  -     Iron Profile  -     Reticulocytes    3. Obesity affecting pregnancy, antepartum  Assessment & Plan:  Discussed exercise, nutrition and appropriate weight gain in pregnancy      4. Asthma affecting pregnancy, antepartum  Overview:  Albuterol MDI    Assessment & Plan:  Symptoms are stable.  Continue albuterol MDI as needed            24w3d  Reassuring pregnancy progress    Counseling: Second trimester precautions  OB precautions,  leaking, VB, kalen gotti vs PTL/Labor    Questions answered    Return in about 4 weeks (around 4/20/2022) for Recheck.      Jluis Aragon MD  03/23/2022

## 2022-03-24 ENCOUNTER — TELEPHONE (OUTPATIENT)
Dept: OBSTETRICS AND GYNECOLOGY | Facility: CLINIC | Age: 22
End: 2022-03-24

## 2022-03-24 DIAGNOSIS — O99.019 MATERNAL ANEMIA IN PREGNANCY, ANTEPARTUM: Primary | ICD-10-CM

## 2022-03-24 RX ORDER — FERROUS SULFATE 325(65) MG
325 TABLET ORAL EVERY OTHER DAY
Qty: 30 TABLET | Refills: 2 | Status: ON HOLD | OUTPATIENT
Start: 2022-03-24 | End: 2022-07-07

## 2022-03-24 NOTE — TELEPHONE ENCOUNTER
----- Message from Jluis Aragon MD sent at 3/24/2022  7:18 AM EDT -----  Notify patient of slightly low iron levels.  Her hemoglobin is acceptable for this gestational age in pregnancy.  I have recommended starting an supplement.  I have sent a prescription for ferrous sulfate 325 mg.  The patient should take 1 tablet every other day.

## 2022-03-25 ENCOUNTER — TELEPHONE (OUTPATIENT)
Dept: OBSTETRICS AND GYNECOLOGY | Facility: CLINIC | Age: 22
End: 2022-03-25

## 2022-03-30 ENCOUNTER — TELEPHONE (OUTPATIENT)
Dept: OBSTETRICS AND GYNECOLOGY | Facility: CLINIC | Age: 22
End: 2022-03-30

## 2022-03-30 NOTE — TELEPHONE ENCOUNTER
Patient returned call- advised patient of 's recommendations and she verbalizes understanding that her prescription was sent to her pharmacy and she'll need to take 1 tablet every other day.

## 2022-04-11 ENCOUNTER — HOSPITAL ENCOUNTER (EMERGENCY)
Facility: HOSPITAL | Age: 22
Discharge: STILL ADMITTED OR ADMINISTRATIVE | End: 2022-04-11

## 2022-04-11 ENCOUNTER — HOSPITAL ENCOUNTER (OUTPATIENT)
Facility: HOSPITAL | Age: 22
Discharge: HOME | End: 2022-04-11
Attending: STUDENT IN AN ORGANIZED HEALTH CARE EDUCATION/TRAINING PROGRAM | Admitting: STUDENT IN AN ORGANIZED HEALTH CARE EDUCATION/TRAINING PROGRAM

## 2022-04-11 ENCOUNTER — APPOINTMENT (OUTPATIENT)
Dept: ULTRASOUND IMAGING | Facility: HOSPITAL | Age: 22
End: 2022-04-11

## 2022-04-11 VITALS
SYSTOLIC BLOOD PRESSURE: 118 MMHG | OXYGEN SATURATION: 98 % | WEIGHT: 225 LBS | HEIGHT: 64 IN | DIASTOLIC BLOOD PRESSURE: 61 MMHG | RESPIRATION RATE: 16 BRPM | BODY MASS INDEX: 38.41 KG/M2 | TEMPERATURE: 98.3 F | HEART RATE: 76 BPM

## 2022-04-11 PROBLEM — R31.29 OTHER MICROSCOPIC HEMATURIA: Status: ACTIVE | Noted: 2022-04-11

## 2022-04-11 LAB
BACTERIA UR QL AUTO: ABNORMAL /HPF
BILIRUB BLD-MCNC: NEGATIVE MG/DL
BILIRUB UR QL STRIP: NEGATIVE
CLARITY UR: CLEAR
CLARITY, POC: CLEAR
COLOR UR: ABNORMAL
COLOR UR: YELLOW
GLUCOSE UR STRIP-MCNC: NEGATIVE MG/DL
GLUCOSE UR STRIP-MCNC: NEGATIVE MG/DL
HGB UR QL STRIP.AUTO: ABNORMAL
HYALINE CASTS UR QL AUTO: ABNORMAL /LPF
KETONES UR QL STRIP: NEGATIVE
KETONES UR QL: NEGATIVE
LEUKOCYTE EST, POC: NEGATIVE
LEUKOCYTE ESTERASE UR QL STRIP.AUTO: NEGATIVE
NITRITE UR QL STRIP: NEGATIVE
NITRITE UR-MCNC: NEGATIVE MG/ML
PH UR STRIP.AUTO: 6 [PH] (ref 5–8)
PH UR: 5.5 [PH] (ref 5–8)
PROT UR QL STRIP: ABNORMAL
PROT UR STRIP-MCNC: NEGATIVE MG/DL
RBC # UR STRIP: ABNORMAL /HPF
RBC # UR STRIP: ABNORMAL /UL
REF LAB TEST METHOD: ABNORMAL
SP GR UR STRIP: >=1.03 (ref 1–1.03)
SP GR UR: 1.03 (ref 1–1.03)
SQUAMOUS #/AREA URNS HPF: ABNORMAL /HPF
UROBILINOGEN UR QL STRIP: ABNORMAL
UROBILINOGEN UR QL: NORMAL
WBC # UR STRIP: ABNORMAL /HPF

## 2022-04-11 PROCEDURE — 81001 URINALYSIS AUTO W/SCOPE: CPT | Performed by: STUDENT IN AN ORGANIZED HEALTH CARE EDUCATION/TRAINING PROGRAM

## 2022-04-11 PROCEDURE — 59025 FETAL NON-STRESS TEST: CPT

## 2022-04-11 PROCEDURE — 81002 URINALYSIS NONAUTO W/O SCOPE: CPT | Performed by: STUDENT IN AN ORGANIZED HEALTH CARE EDUCATION/TRAINING PROGRAM

## 2022-04-11 PROCEDURE — 76775 US EXAM ABDO BACK WALL LIM: CPT

## 2022-04-11 PROCEDURE — 99213 OFFICE O/P EST LOW 20 MIN: CPT | Performed by: STUDENT IN AN ORGANIZED HEALTH CARE EDUCATION/TRAINING PROGRAM

## 2022-04-11 PROCEDURE — G0463 HOSPITAL OUTPT CLINIC VISIT: HCPCS

## 2022-04-11 PROCEDURE — 59025 FETAL NON-STRESS TEST: CPT | Performed by: STUDENT IN AN ORGANIZED HEALTH CARE EDUCATION/TRAINING PROGRAM

## 2022-04-11 RX ORDER — ACETAMINOPHEN 500 MG
1000 TABLET ORAL ONCE
Status: COMPLETED | OUTPATIENT
Start: 2022-04-11 | End: 2022-04-11

## 2022-04-11 RX ORDER — ACETAMINOPHEN 500 MG
1000 TABLET ORAL EVERY 6 HOURS PRN
Qty: 30 TABLET | Refills: 0 | Status: ON HOLD | OUTPATIENT
Start: 2022-04-11 | End: 2022-07-07

## 2022-04-11 RX ADMIN — ACETAMINOPHEN 1000 MG: 500 TABLET ORAL at 16:51

## 2022-04-11 NOTE — NURSING NOTE
Dr. Khan notified by phone and informed of patient's arrival with c/o low back pain, bilateral side pains and no fetal movement felt since ,  at 27 1/7 weeks gestation.  States that work made her come in to be seen. 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 States that had high blood pressure with last pregnancy and Dr. Mathias told her that this next appt. On 22 to decide if needs medication for it. B/p 128/65.  Since placing on fetal monitor, audible fetal movement and states not feeling that.  Since left room, patient has pressed fetal movement marker button 11 times. Dr. Khan states to dipstick her urine specimen and give !gm of Tylenol now.

## 2022-04-12 NOTE — NON STRESS TEST
"Obstetrical Non-stress Test Interpretation     Name:  Iftikhar Russell  MRN: [id number]    21 y.o. female  at 27w1d    Indication: Lower back pain, right side pain, decreased fetal movement since  on 04/10/22.       Fetal Assessment  Fetal Movement: active  Fetal HR Assessment Method: external  Fetal HR (beats/min): 145  Fetal HR Baseline: normal range  Fetal HR Variability: moderate (amplitude range 6 to 25 bpm)  Fetal HR Accelerations: lasts at least 10 seconds (32 wks gest or less), greater than/equal to 10 bpm (32 wks gest or less)  Fetal HR Decelerations: absent  Sinusoidal Pattern Present: absent    /61 (BP Location: Left arm)   Pulse 76   Temp 98.3 °F (36.8 °C) (Oral)   Resp 16   Ht 162.6 cm (64\")   Wt 102 kg (225 lb)   LMP  (LMP Unknown)   SpO2 98%   BMI 38.62 kg/m²     Reason for test: OB Triage  Date of Test: 2022  Time frame of test:  RN NST Interpretation:        Rosalie Fuentes RN  2022  21:41 EDT  "

## 2022-04-12 NOTE — PROGRESS NOTES
OB TRIAGE NOTE        Name:  Iftikhar Russell  MRN: [id number]    21 y.o. female  at 27w1d    Chief Complaint: R side back pain; Decreased fetal movement    Subjective:Iftikhar Russell is a 21 y.o.  27w1d presenting for a one day history of right sided flank pain radiating toward groin and decreased fetal movement. She reports that she has not been feeling baby move since yesterday evening around 1030. She also endorses sharp constant right sided pain that radiates towards the groin. She reports that she was at work today () reporting these symptoms and was told to be seen in ED. She reports that she took 1 200mg tablet of ibuprofen which did not assist with pain. Reports that pain is exacerbated by laying on right side. Reports a history of urinary issues in her previous pregnancy. Denies any fevers and home. Is tolerating regular diet and oral hydration. Does endorse some burning with urination upon initiation. Denies any blood in urine. No associated nausea and vomiting during this acute onset.       ROS: No leaking fluid, No vaginal bleeding, No contractions, Decreased FM, Back pain and UTI symptoms    Objective:    Physical Exam  Constitutional:       General: She is not in acute distress.     Appearance: She is obese. She is not toxic-appearing or diaphoretic.   Cardiovascular:      Rate and Rhythm: Normal rate and regular rhythm.   Pulmonary:      Effort: Pulmonary effort is normal.      Breath sounds: Normal breath sounds.   Abdominal:      Tenderness: There is right CVA tenderness. There is no left CVA tenderness, guarding or rebound.      Comments: Gravid     Neurological:      Mental Status: She is alert and oriented to person, place, and time.   Skin:     General: Skin is warm and dry.   Psychiatric:         Mood and Affect: Mood normal.         Behavior: Behavior normal.         Thought Content: Thought content normal.         Judgment: Judgment normal.   Vitals and nursing note  reviewed.           Baseline: 145  Variability:   Moderate/Normal (amplitude 6-25 bpm)  Accelerations: Present (32 weeks+) 15 x 15 bpm  Decelerations: None  Contractions:  Not amee    Impression:  Appropriate NST for gestational age      Cervical exam: deferred     Lab Results   Component Value Date    WBC 7.22 03/23/2022    HGB 11.8 (L) 03/23/2022    HCT 36.6 03/23/2022    MCV 89.1 03/23/2022     03/23/2022      Lab Results   Component Value Date    GLUCOSE 77 12/20/2021    BUN 6 12/20/2021    CREATININE 0.39 (L) 12/20/2021    EGFRIFNONA >150 12/20/2021    BCR 15.4 12/20/2021    K 4.1 12/20/2021    CO2 27.9 12/20/2021    CALCIUM 9.0 12/20/2021    ALBUMIN 4.20 12/20/2021    LABIL2 1.2 (L) 04/24/2021    AST 17 12/20/2021    ALT 16 12/20/2021        Clean catch urine w/ moderate blood no WBC; straight cath with 0-2 RBCs  Kidney U/S unremarkable, no hydronephrosis or kidney stone visualized     Assessment:  Abdominal pain in pregnancy     Plan:  Differential diagnosis includes kidney stone, round ligament discomfort   PO hydration, strain urine; Tylenol 1 gram every 6 hours as needed  Pelvic rest   No lifting > 25 lbs  Pregnancy support belt   Close follow up in office     Electronically signed by Clement Khan MD, 04/11/22, 8:39 PM EDT.

## 2022-04-12 NOTE — NURSING NOTE
Dr. Khan notified by phone and informed that patient is very uncomfortable when turning to right side.Had 1 gm of Tylenol at 1705.Feeling fetal movement now. New order noted for straight catheterization to determine if blood in urine.

## 2022-04-19 ENCOUNTER — ROUTINE PRENATAL (OUTPATIENT)
Dept: OBSTETRICS AND GYNECOLOGY | Facility: CLINIC | Age: 22
End: 2022-04-19

## 2022-04-19 VITALS — DIASTOLIC BLOOD PRESSURE: 82 MMHG | SYSTOLIC BLOOD PRESSURE: 132 MMHG | WEIGHT: 223 LBS | BODY MASS INDEX: 38.28 KG/M2

## 2022-04-19 DIAGNOSIS — F41.9 ANXIETY DISORDER AFFECTING PREGNANCY, ANTEPARTUM: ICD-10-CM

## 2022-04-19 DIAGNOSIS — O26.13 LOW WEIGHT GAIN DURING PREGNANCY IN THIRD TRIMESTER: ICD-10-CM

## 2022-04-19 DIAGNOSIS — R31.29 OTHER MICROSCOPIC HEMATURIA: ICD-10-CM

## 2022-04-19 DIAGNOSIS — O99.340 ANXIETY DISORDER AFFECTING PREGNANCY, ANTEPARTUM: ICD-10-CM

## 2022-04-19 DIAGNOSIS — O99.210 OBESITY AFFECTING PREGNANCY, ANTEPARTUM: ICD-10-CM

## 2022-04-19 DIAGNOSIS — J45.909 ASTHMA AFFECTING PREGNANCY, ANTEPARTUM: ICD-10-CM

## 2022-04-19 DIAGNOSIS — O99.519 ASTHMA AFFECTING PREGNANCY, ANTEPARTUM: ICD-10-CM

## 2022-04-19 DIAGNOSIS — Z34.80 SUPERVISION OF OTHER NORMAL PREGNANCY, ANTEPARTUM: Primary | ICD-10-CM

## 2022-04-19 LAB
GLUCOSE UR STRIP-MCNC: NEGATIVE MG/DL
PROT UR STRIP-MCNC: NEGATIVE MG/DL

## 2022-04-19 PROCEDURE — 99214 OFFICE O/P EST MOD 30 MIN: CPT | Performed by: OBSTETRICS & GYNECOLOGY

## 2022-04-19 NOTE — PROGRESS NOTES
OB FOLLOW UP    CC: Scheduled OB routine FU     Prenatal care complicated by:   Patient Active Problem List   Diagnosis   • Asthma   • Generalized anxiety disorder   • Obesity   • Supervision of other normal pregnancy, antepartum   • Obesity affecting pregnancy, antepartum   • Anxiety disorder affecting pregnancy, antepartum   • Asthma affecting pregnancy, antepartum   • Postural dizziness with presyncope   • Other microscopic hematuria       Subjective:   Patient has: No complaints, No leaking fluid, No vaginal bleeding, No contractions, Adequate FM  Patient reports that she passed multiple kidney stones about 10 days ago.  Feeling better now.    Objective:  Urine glucose/protein- see flow sheet      /82   Wt 101 kg (223 lb)   LMP  (LMP Unknown)   BMI 38.28 kg/m²   See OB flow for LE edema, and cvx exam if applicable  FHT: 154 BPM   Uterine Size: 28 cm      Assessment and Plan:  Diagnoses and all orders for this visit:    1. Supervision of other normal pregnancy, antepartum (Primary)  Overview:  EDC: 7/10/2022    Nips: Negative    Asthma  Obesity  Anxiety    COVID-19 vaccine: Complete  Flu vaccine: Recommended  Tdap vaccine:    Assessment & Plan:  Continue prenatal vitamins  Fetal kick counts   labor warnings    Orders:  -     POC Urinalysis Dipstick    2. Other microscopic hematuria  Overview:  2022 r sided flank pain, acute onset, urine dip moderate blood, straight cath 0-2 RBC; no WBC. Renal U/S unremarkable ; suspect small stone vs. Bladder irritation in pregnancy; strain urine. Tylenol as needed.       3. Low weight gain during pregnancy in third trimester  -      Ob 14 + Weeks Single or First Gestation; Future    4. Anxiety disorder affecting pregnancy, antepartum  Overview:  Zoloft 50 mg daily    Assessment & Plan:  Stable.  Continue Zoloft.      5. Asthma affecting pregnancy, antepartum  Overview:  Albuterol MDI    Assessment & Plan:  Stable.  Continue albuterol MDI.      6. Obesity  affecting pregnancy, antepartum  Assessment & Plan:  Discussed exercise, nutrition and appropriate weight gain in pregnancy          28w2d  Reassuring pregnancy progress    Counseling: OB precautions, leaking, VB, kalen gotti vs PTL/Labor  [facility]    Questions answered    Return in about 2 weeks (around 5/3/2022) for Recheck.      Jluis Aragon MD  04/19/2022

## 2022-04-25 ENCOUNTER — HOSPITAL ENCOUNTER (OUTPATIENT)
Facility: HOSPITAL | Age: 22
Discharge: HOME | End: 2022-04-25
Attending: STUDENT IN AN ORGANIZED HEALTH CARE EDUCATION/TRAINING PROGRAM | Admitting: STUDENT IN AN ORGANIZED HEALTH CARE EDUCATION/TRAINING PROGRAM

## 2022-04-25 ENCOUNTER — HOSPITAL ENCOUNTER (OUTPATIENT)
Facility: HOSPITAL | Age: 22
End: 2022-04-25
Attending: STUDENT IN AN ORGANIZED HEALTH CARE EDUCATION/TRAINING PROGRAM | Admitting: STUDENT IN AN ORGANIZED HEALTH CARE EDUCATION/TRAINING PROGRAM

## 2022-04-25 VITALS
RESPIRATION RATE: 18 BRPM | HEART RATE: 87 BPM | BODY MASS INDEX: 39.62 KG/M2 | HEIGHT: 63 IN | DIASTOLIC BLOOD PRESSURE: 61 MMHG | SYSTOLIC BLOOD PRESSURE: 114 MMHG | WEIGHT: 223.6 LBS | TEMPERATURE: 98.4 F | OXYGEN SATURATION: 100 %

## 2022-04-25 LAB
BILIRUB BLD-MCNC: NEGATIVE MG/DL
CLARITY, POC: CLEAR
COLOR UR: YELLOW
GLUCOSE UR STRIP-MCNC: NEGATIVE MG/DL
KETONES UR QL: NEGATIVE
LEUKOCYTE EST, POC: NEGATIVE
NITRITE UR-MCNC: NEGATIVE MG/ML
PH UR: 6.5 [PH] (ref 5–8)
PROT UR STRIP-MCNC: NEGATIVE MG/DL
RBC # UR STRIP: ABNORMAL /UL
SP GR UR: 1.03 (ref 1–1.03)
UROBILINOGEN UR QL: NORMAL

## 2022-04-25 PROCEDURE — G0463 HOSPITAL OUTPT CLINIC VISIT: HCPCS

## 2022-04-25 PROCEDURE — 59025 FETAL NON-STRESS TEST: CPT | Performed by: STUDENT IN AN ORGANIZED HEALTH CARE EDUCATION/TRAINING PROGRAM

## 2022-04-25 PROCEDURE — 59025 FETAL NON-STRESS TEST: CPT

## 2022-04-25 PROCEDURE — 81002 URINALYSIS NONAUTO W/O SCOPE: CPT | Performed by: STUDENT IN AN ORGANIZED HEALTH CARE EDUCATION/TRAINING PROGRAM

## 2022-04-26 NOTE — DISCHARGE INSTR - ACTIVITY
Weight lifting restrictions, no lifting greater than 25 pounds, pelvic rest until further instructed by the doctor.

## 2022-04-26 NOTE — NON STRESS TEST
"Obstetrical Non-stress Test Interpretation     Name:  Iftikhar Russell  MRN: [id number]    21 y.o. female  at 29w1d    Indication: vaginal bleeding      Fetal Assessment  Fetal Movement: active  Fetal HR Assessment Method: external  Fetal HR (beats/min): 135  Fetal HR Baseline: normal range  Fetal HR Variability: moderate (amplitude range 6 to 25 bpm)  Fetal HR Accelerations: episodic, greater than/equal to 15 bpm  Fetal HR Decelerations: absent  Sinusoidal Pattern Present: absent    /61 (BP Location: Right arm, Patient Position: Sitting)   Pulse 87   Temp 98.4 °F (36.9 °C) (Oral)   Resp 18   Ht 160 cm (63\")   Wt 101 kg (223 lb 9.6 oz)   LMP  (LMP Unknown)   SpO2 100%   BMI 39.61 kg/m²     Reason for test:  vaginal bleeding  Date of Test: 2022  Time frame of test:6549-8136  RN NST Interpretation:  reactive for gestational age      Janelle Smith RN  2022  21:26 EDT  "

## 2022-04-26 NOTE — NURSING NOTE
29.1 weeks presents with c/o while working at her job at the  that she went to the bathroom and when she wiped there was bright red blood on the tissue and a drop dripped in the toilet, pt. 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 Denies leakage of fluid, states +fm noted,  No ctx: or uterine irrit. noted on monitor,abd soft, non-tender with palpation, no flank pain noted, pt. Denies any burning, urgency or frequency of urination, sve ext  Os FT, int Os closed, 40-50, -4 posterior, no blood noted on exam glove, pt. Provided po hydration,  notified of the above orders received to give po hydration, get reactive NST,then d/c home with pelvic rest and lifting restrictions, nothing over 25 lbs, pt. To follow up in office.

## 2022-05-02 ENCOUNTER — ROUTINE PRENATAL (OUTPATIENT)
Dept: OBSTETRICS AND GYNECOLOGY | Facility: CLINIC | Age: 22
End: 2022-05-02

## 2022-05-02 VITALS — DIASTOLIC BLOOD PRESSURE: 73 MMHG | WEIGHT: 220 LBS | BODY MASS INDEX: 38.97 KG/M2 | SYSTOLIC BLOOD PRESSURE: 117 MMHG

## 2022-05-02 DIAGNOSIS — O99.210 OBESITY AFFECTING PREGNANCY, ANTEPARTUM: ICD-10-CM

## 2022-05-02 DIAGNOSIS — O99.340 ANXIETY DISORDER AFFECTING PREGNANCY, ANTEPARTUM: ICD-10-CM

## 2022-05-02 DIAGNOSIS — O99.519 ASTHMA AFFECTING PREGNANCY, ANTEPARTUM: ICD-10-CM

## 2022-05-02 DIAGNOSIS — R31.29 OTHER MICROSCOPIC HEMATURIA: ICD-10-CM

## 2022-05-02 DIAGNOSIS — F41.9 ANXIETY DISORDER AFFECTING PREGNANCY, ANTEPARTUM: ICD-10-CM

## 2022-05-02 DIAGNOSIS — J45.909 ASTHMA AFFECTING PREGNANCY, ANTEPARTUM: ICD-10-CM

## 2022-05-02 DIAGNOSIS — Z34.80 SUPERVISION OF OTHER NORMAL PREGNANCY, ANTEPARTUM: Primary | ICD-10-CM

## 2022-05-02 DIAGNOSIS — Z3A.30 30 WEEKS GESTATION OF PREGNANCY: ICD-10-CM

## 2022-05-02 LAB
GLUCOSE UR STRIP-MCNC: NEGATIVE MG/DL
LEUKOCYTE EST, POC: NEGATIVE
NITRITE UR-MCNC: NEGATIVE MG/ML
PROT UR STRIP-MCNC: NEGATIVE MG/DL

## 2022-05-02 PROCEDURE — 99213 OFFICE O/P EST LOW 20 MIN: CPT | Performed by: NURSE PRACTITIONER

## 2022-05-02 NOTE — PROGRESS NOTES
OB FOLLOW UP        Chief Complaint   Patient presents with   • Routine Prenatal Visit     30 weeks        Subjective:   • Work stress related to being asked to work overtime    Objective:  /73   Wt 99.8 kg (220 lb)   LMP  (LMP Unknown)   BMI 38.97 kg/m²   Uterine Size: size equals dates  FHT: 110-160 BPM    See OB flow for LE edema, cvx exam if performed, and Upro/Uglu    Assessment and Plan:  30w1d  Reassuring pregnancy progress.  Questions answered.  Diagnoses and all orders for this visit:    1. Supervision of other normal pregnancy, antepartum (Primary)  Overview:  EDC: 7/10/2022    Nips: Negative    Asthma  Obesity  Anxiety    COVID-19 vaccine: Complete  Flu vaccine: Recommended  Tdap vaccine:    Orders:  -     POC Urinalysis Dipstick    2. Other microscopic hematuria  Overview:  4/11/2022 r sided flank pain, acute onset, urine dip moderate blood, straight cath 0-2 RBC; no WBC. Renal U/S unremarkable ; suspect small stone vs. Bladder irritation in pregnancy; strain urine. Tylenol as needed.       3. 30 weeks gestation of pregnancy    4. Anxiety disorder affecting pregnancy, antepartum  Overview:  Zoloft 50 mg daily  5/2/22 - increased work stress due to being asked to work overtime, note given for 8 hours/day.    Assessment & Plan:  Note given for 8 hours/day      5. Obesity affecting pregnancy, antepartum  Assessment & Plan:  Growth US next visit      6. Asthma affecting pregnancy, antepartum  Overview:  Albuterol MDI      Counseling:    • OB precautions, leaking, VB, kalen gotti vs PTL/Labor  • [facility]  • Work note given for 8 hours/day  • Continue PNV.  Importance of healthy eating and exercise.  • Growth ultrasound next visit    Return in 16 days (on 5/18/2022) for OB follow up, Dr. Khan.            Trav Acosta, APRN  05/02/2022    [facility]  [facility]  [address] [address]  Dept: [phone]  Loc: [phone]

## 2022-05-12 ENCOUNTER — HOSPITAL ENCOUNTER (EMERGENCY)
Facility: HOSPITAL | Age: 22
Discharge: HOME | End: 2022-05-12
Attending: EMERGENCY MEDICINE | Admitting: EMERGENCY MEDICINE

## 2022-05-12 VITALS
HEART RATE: 95 BPM | TEMPERATURE: 98.1 F | HEIGHT: 63 IN | OXYGEN SATURATION: 98 % | DIASTOLIC BLOOD PRESSURE: 65 MMHG | RESPIRATION RATE: 17 BRPM | BODY MASS INDEX: 39.49 KG/M2 | WEIGHT: 222.88 LBS | SYSTOLIC BLOOD PRESSURE: 106 MMHG

## 2022-05-12 DIAGNOSIS — M62.838 MUSCLE SPASM: Primary | ICD-10-CM

## 2022-05-12 LAB
ALBUMIN SERPL-MCNC: 3.4 G/DL (ref 3.5–5.2)
ALBUMIN/GLOB SERPL: 1.1 G/DL
ALP SERPL-CCNC: 115 U/L (ref 39–117)
ALT SERPL W P-5'-P-CCNC: 18 U/L (ref 1–33)
ANION GAP SERPL CALCULATED.3IONS-SCNC: 12.4 MMOL/L (ref 5–15)
AST SERPL-CCNC: 18 U/L (ref 1–32)
BACTERIA UR QL AUTO: NORMAL /HPF
BASOPHILS # BLD AUTO: 0.03 10*3/MM3 (ref 0–0.2)
BASOPHILS NFR BLD AUTO: 0.4 % (ref 0–1.5)
BILIRUB SERPL-MCNC: 0.2 MG/DL (ref 0–1.2)
BILIRUB UR QL STRIP: NEGATIVE
BUN SERPL-MCNC: 5 MG/DL (ref 6–20)
BUN/CREAT SERPL: 11.1 (ref 7–25)
CALCIUM SPEC-SCNC: 9.3 MG/DL (ref 8.6–10.5)
CHLORIDE SERPL-SCNC: 102 MMOL/L (ref 98–107)
CLARITY UR: CLEAR
CO2 SERPL-SCNC: 19.6 MMOL/L (ref 22–29)
COLOR UR: YELLOW
CREAT SERPL-MCNC: 0.45 MG/DL (ref 0.57–1)
DEPRECATED RDW RBC AUTO: 49.6 FL (ref 37–54)
EGFRCR SERPLBLD CKD-EPI 2021: 138.8 ML/MIN/1.73
EOSINOPHIL # BLD AUTO: 0.22 10*3/MM3 (ref 0–0.4)
EOSINOPHIL NFR BLD AUTO: 3.2 % (ref 0.3–6.2)
ERYTHROCYTE [DISTWIDTH] IN BLOOD BY AUTOMATED COUNT: 15 % (ref 12.3–15.4)
FLUAV AG NPH QL: NEGATIVE
FLUBV AG NPH QL IA: NEGATIVE
GLOBULIN UR ELPH-MCNC: 3.2 GM/DL
GLUCOSE SERPL-MCNC: 89 MG/DL (ref 65–99)
GLUCOSE UR STRIP-MCNC: NEGATIVE MG/DL
HCG INTACT+B SERPL-ACNC: 9608 MIU/ML
HCT VFR BLD AUTO: 36.5 % (ref 34–46.6)
HGB BLD-MCNC: 11.7 G/DL (ref 12–15.9)
HGB UR QL STRIP.AUTO: ABNORMAL
HOLD SPECIMEN: NORMAL
HOLD SPECIMEN: NORMAL
HYALINE CASTS UR QL AUTO: NORMAL /LPF
IMM GRANULOCYTES # BLD AUTO: 0.05 10*3/MM3 (ref 0–0.05)
IMM GRANULOCYTES NFR BLD AUTO: 0.7 % (ref 0–0.5)
KETONES UR QL STRIP: NEGATIVE
LEUKOCYTE ESTERASE UR QL STRIP.AUTO: NEGATIVE
LIPASE SERPL-CCNC: 17 U/L (ref 13–60)
LYMPHOCYTES # BLD AUTO: 1 10*3/MM3 (ref 0.7–3.1)
LYMPHOCYTES NFR BLD AUTO: 14.5 % (ref 19.6–45.3)
MCH RBC QN AUTO: 28.8 PG (ref 26.6–33)
MCHC RBC AUTO-ENTMCNC: 32.1 G/DL (ref 31.5–35.7)
MCV RBC AUTO: 89.9 FL (ref 79–97)
MONOCYTES # BLD AUTO: 0.48 10*3/MM3 (ref 0.1–0.9)
MONOCYTES NFR BLD AUTO: 7 % (ref 5–12)
NEUTROPHILS NFR BLD AUTO: 5.12 10*3/MM3 (ref 1.7–7)
NEUTROPHILS NFR BLD AUTO: 74.2 % (ref 42.7–76)
NITRITE UR QL STRIP: NEGATIVE
NRBC BLD AUTO-RTO: 0 /100 WBC (ref 0–0.2)
PH UR STRIP.AUTO: 6.5 [PH] (ref 5–8)
PLATELET # BLD AUTO: 200 10*3/MM3 (ref 140–450)
PMV BLD AUTO: 11.5 FL (ref 6–12)
POTASSIUM SERPL-SCNC: 3.7 MMOL/L (ref 3.5–5.2)
PROT SERPL-MCNC: 6.6 G/DL (ref 6–8.5)
PROT UR QL STRIP: NEGATIVE
RBC # BLD AUTO: 4.06 10*6/MM3 (ref 3.77–5.28)
RBC # UR STRIP: NORMAL /HPF
REF LAB TEST METHOD: NORMAL
SARS-COV-2 RNA PNL SPEC NAA+PROBE: NOT DETECTED
SODIUM SERPL-SCNC: 134 MMOL/L (ref 136–145)
SP GR UR STRIP: <=1.005 (ref 1–1.03)
SQUAMOUS #/AREA URNS HPF: NORMAL /HPF
UROBILINOGEN UR QL STRIP: ABNORMAL
WBC # UR STRIP: NORMAL /HPF
WBC NRBC COR # BLD: 6.9 10*3/MM3 (ref 3.4–10.8)
WHOLE BLOOD HOLD SPECIMEN: NORMAL

## 2022-05-12 PROCEDURE — 84702 CHORIONIC GONADOTROPIN TEST: CPT

## 2022-05-12 PROCEDURE — 83690 ASSAY OF LIPASE: CPT

## 2022-05-12 PROCEDURE — 85025 COMPLETE CBC W/AUTO DIFF WBC: CPT

## 2022-05-12 PROCEDURE — 99283 EMERGENCY DEPT VISIT LOW MDM: CPT

## 2022-05-12 PROCEDURE — U0004 COV-19 TEST NON-CDC HGH THRU: HCPCS | Performed by: EMERGENCY MEDICINE

## 2022-05-12 PROCEDURE — 87804 INFLUENZA ASSAY W/OPTIC: CPT | Performed by: EMERGENCY MEDICINE

## 2022-05-12 PROCEDURE — 80053 COMPREHEN METABOLIC PANEL: CPT

## 2022-05-12 PROCEDURE — 81001 URINALYSIS AUTO W/SCOPE: CPT | Performed by: EMERGENCY MEDICINE

## 2022-05-12 PROCEDURE — 36415 COLL VENOUS BLD VENIPUNCTURE: CPT

## 2022-05-12 PROCEDURE — C9803 HOPD COVID-19 SPEC COLLECT: HCPCS | Performed by: EMERGENCY MEDICINE

## 2022-05-12 RX ORDER — CYCLOBENZAPRINE HCL 10 MG
10 TABLET ORAL 2 TIMES DAILY PRN
Qty: 15 TABLET | Refills: 0 | Status: SHIPPED | OUTPATIENT
Start: 2022-05-12 | End: 2022-06-07

## 2022-05-12 RX ORDER — SODIUM CHLORIDE 0.9 % (FLUSH) 0.9 %
10 SYRINGE (ML) INJECTION AS NEEDED
Status: DISCONTINUED | OUTPATIENT
Start: 2022-05-12 | End: 2022-05-12 | Stop reason: HOSPADM

## 2022-05-18 ENCOUNTER — ROUTINE PRENATAL (OUTPATIENT)
Dept: OBSTETRICS AND GYNECOLOGY | Facility: CLINIC | Age: 22
End: 2022-05-18

## 2022-05-18 VITALS — WEIGHT: 222 LBS | DIASTOLIC BLOOD PRESSURE: 75 MMHG | SYSTOLIC BLOOD PRESSURE: 124 MMHG | BODY MASS INDEX: 39.33 KG/M2

## 2022-05-18 DIAGNOSIS — F41.9 ANXIETY DISORDER AFFECTING PREGNANCY, ANTEPARTUM: ICD-10-CM

## 2022-05-18 DIAGNOSIS — M54.50 RIGHT-SIDED LOW BACK PAIN WITHOUT SCIATICA, UNSPECIFIED CHRONICITY: ICD-10-CM

## 2022-05-18 DIAGNOSIS — O99.340 ANXIETY DISORDER AFFECTING PREGNANCY, ANTEPARTUM: ICD-10-CM

## 2022-05-18 DIAGNOSIS — O99.210 OBESITY AFFECTING PREGNANCY, ANTEPARTUM: ICD-10-CM

## 2022-05-18 DIAGNOSIS — Z34.80 SUPERVISION OF OTHER NORMAL PREGNANCY, ANTEPARTUM: Primary | ICD-10-CM

## 2022-05-18 PROBLEM — G89.29 CHRONIC RIGHT-SIDED LOW BACK PAIN WITHOUT SCIATICA: Status: ACTIVE | Noted: 2022-05-18

## 2022-05-18 LAB
GLUCOSE UR STRIP-MCNC: NEGATIVE MG/DL
PROT UR STRIP-MCNC: NEGATIVE MG/DL

## 2022-05-18 PROCEDURE — 99213 OFFICE O/P EST LOW 20 MIN: CPT | Performed by: STUDENT IN AN ORGANIZED HEALTH CARE EDUCATION/TRAINING PROGRAM

## 2022-05-18 NOTE — PROGRESS NOTES
OB FOLLOW UP  Complaint   Chief Complaint   Patient presents with   • Routine Prenatal Visit            Iftikhar Russell is a 21 y.o.  32w3d patient being seen today for her obstetrical follow up visit. Patient denies decreased fetal movement, contractions, loss of fluid or vaginal bleeding. Patient reports on going lower back pain radiating into right leg. Denies fevers or chills. No pain with urination. She is attempting to locate her pregnancy support belt.      Her prenatal care is complicated by (and status) :    Patient Active Problem List   Diagnosis   • Asthma   • Supervision of other normal pregnancy, antepartum   • Obesity affecting pregnancy, antepartum   • Anxiety disorder affecting pregnancy, antepartum   • Asthma affecting pregnancy, antepartum   • Postural dizziness with presyncope   • Other microscopic hematuria   • Chronic right-sided low back pain without sciatica       All other systems reviewed and are negative.     The additional following portions of the patient's history were reviewed and updated as appropriate: allergies, current medications, past family history, past medical history, past social history, past surgical history and problem list.      EXAM:     Vital signs: /75   Wt 101 kg (222 lb)   LMP  (LMP Unknown)   BMI 39.33 kg/m²   Appearance/psychiatric: To be in no distress  Constitutional: The patient is well nourished.  Cardiovascular: She does not have edema.  Respiratory: Respiratory effort is normal.  Gastrointestinal: Abdomen is soft, gravid, nontender, no rashes, heart tones are present, fundal height is size equals dates    Pelvic Exam: No    Urine glucose/protein: See prenatal flowsheet       Assessment and Plan    Problem List Items Addressed This Visit        Gravid and     Supervision of other normal pregnancy, antepartum - Primary    Overview     EDC: 7/10/2022    Nips: Negative    Asthma  Obesity  Anxiety    COVID-19 vaccine: Complete  Flu  vaccine: Recommended  Tdap vaccine:    5/18/2022 EFWE 1948 (53.9) JANETTE wnl. Vertex           Relevant Orders    POC Urinalysis Dipstick (Completed)    Obesity affecting pregnancy, antepartum    Anxiety disorder affecting pregnancy, antepartum    Overview     Zoloft 50 mg daily  5/2/22 - increased work stress due to being asked to work overtime, note given for 8 hours/day.             Other Visit Diagnoses     Right-sided low back pain without sciatica, unspecified chronicity        Relevant Orders    Ambulatory Referral to Physical Therapy Evaluate and treat          Impression  1. Pregnancy at 32w3d  2. Fetal status reassuring.   3. Activity and Exercise discussed.    Plan  1. PT referral for back pain. Recommend sparing use of Flexeril to zero use. Tylenol and heat, support belt appropriate  2. Weight lifting restriction  3. Review of growth scan with normal growth.   4. Follow up 2 weeks       Patient was counseled to the following pregnancy precautions:  • Decreased fetal movement, if concern for decreased fetal movement please perform fetal kick counts you are looking for 10 movements in 2 hours.  If concern for fetal movement and not meeting that criteria, please present to triage for evaluation.  • Contractions occurring every 5 minutes for over an hour, lasting 30 to 60 seconds and progressively causing more discomfort, please seek medical attention to rule out labor  • If you believe that your water is broken, place a sanitary pad.  If pad fills in short period of time i.e. less than 5 minutes, take off pad placed another pad.  If this is saturated please present for rule out rupture of membranes  • Vaginal bleeding can be normal in pregnancy, this usually takes a form of spotting.  If having heavier bleeding like a menstrual period please present for evaluation; especially in light of severe abdominal pain this could represent a placental abruption.  • Keep all scheduled appointments as  recommended.        Clement Khan MD  05/18/2022

## 2022-05-20 ENCOUNTER — HOSPITAL ENCOUNTER (OUTPATIENT)
Facility: HOSPITAL | Age: 22
Discharge: HOME | End: 2022-05-20
Attending: OBSTETRICS & GYNECOLOGY | Admitting: OBSTETRICS & GYNECOLOGY

## 2022-05-20 ENCOUNTER — HOSPITAL ENCOUNTER (OUTPATIENT)
Facility: HOSPITAL | Age: 22
End: 2022-05-20
Attending: OBSTETRICS & GYNECOLOGY | Admitting: OBSTETRICS & GYNECOLOGY

## 2022-05-20 ENCOUNTER — HOSPITAL ENCOUNTER (EMERGENCY)
Facility: HOSPITAL | Age: 22
Discharge: STILL ADMITTED OR ADMINISTRATIVE | End: 2022-05-20

## 2022-05-20 VITALS — SYSTOLIC BLOOD PRESSURE: 121 MMHG | RESPIRATION RATE: 18 BRPM | DIASTOLIC BLOOD PRESSURE: 67 MMHG | HEART RATE: 97 BPM

## 2022-05-20 LAB
DEPRECATED RDW RBC AUTO: 48.3 FL (ref 37–54)
ERYTHROCYTE [DISTWIDTH] IN BLOOD BY AUTOMATED COUNT: 15.2 % (ref 12.3–15.4)
HCT VFR BLD AUTO: 35.2 % (ref 34–46.6)
HGB BLD-MCNC: 11.4 G/DL (ref 12–15.9)
HGB F BLD QL KLEIH BETKE: NORMAL
MCH RBC QN AUTO: 28.1 PG (ref 26.6–33)
MCHC RBC AUTO-ENTMCNC: 32.4 G/DL (ref 31.5–35.7)
MCV RBC AUTO: 86.9 FL (ref 79–97)
PLATELET # BLD AUTO: 228 10*3/MM3 (ref 140–450)
PMV BLD AUTO: 11.4 FL (ref 6–12)
RBC # BLD AUTO: 4.05 10*6/MM3 (ref 3.77–5.28)
WBC NRBC COR # BLD: 6.09 10*3/MM3 (ref 3.4–10.8)

## 2022-05-20 PROCEDURE — G0463 HOSPITAL OUTPT CLINIC VISIT: HCPCS

## 2022-05-20 PROCEDURE — 85460 HEMOGLOBIN FETAL: CPT | Performed by: OBSTETRICS & GYNECOLOGY

## 2022-05-20 PROCEDURE — 85027 COMPLETE CBC AUTOMATED: CPT | Performed by: OBSTETRICS & GYNECOLOGY

## 2022-05-20 PROCEDURE — 59020 FETAL CONTRACT STRESS TEST: CPT

## 2022-05-20 PROCEDURE — 59025 FETAL NON-STRESS TEST: CPT | Performed by: OBSTETRICS & GYNECOLOGY

## 2022-05-20 NOTE — NURSING NOTE
PT HAD BEEN SLEEPING. PT DENIES ANY PAIN OR DISCOMFORT. ABD SOFT AND NONTENDER. PT DISCHARGED AS ORDERED.   
PT REPORTS FELL ON RIGHT SIDE AND BACK DOWN 7 STEPS THIS AM. PT DENIES ANY PAIN OR DISCOMFORT. NO REDNESS OR BRUISING NOTED. PT REPORTS + FETAL MOVEMENT. DR. KIM NOTIFIED OF PTS STATUS. ORDERS RECEIVED.  
28[birth year]2018

## 2022-05-20 NOTE — NON STRESS TEST
Obstetrical Non-stress Test Interpretation     Name:  Iftikhar Russell  MRN: [id number]    21 y.o. female  at 32w5d    Indication: NST/FELL      Fetal Assessment  Fetal Movement: active  Fetal HR Assessment Method: external  Fetal HR (beats/min): 145  Fetal HR Baseline: normal range  Fetal HR Variability: moderate (amplitude range 6 to 25 bpm)  Fetal HR Accelerations: lasting at least 15 seconds  Fetal HR Decelerations: absent    /67 (BP Location: Right arm, Patient Position: Lying)   Pulse 97   Resp 18   LMP  (LMP Unknown)     Reason for test: OB Triage (NST/FELL)  Date of Test: 2022  Time frame of test: 6437-3030  RN NST Interpretation: Reactive      Concepcion Zambrano RN  2022  13:23 EDT

## 2022-06-07 ENCOUNTER — HOSPITAL ENCOUNTER (OUTPATIENT)
Facility: HOSPITAL | Age: 22
Discharge: HOME | End: 2022-06-07
Attending: OBSTETRICS & GYNECOLOGY | Admitting: OBSTETRICS & GYNECOLOGY

## 2022-06-07 ENCOUNTER — ROUTINE PRENATAL (OUTPATIENT)
Dept: OBSTETRICS AND GYNECOLOGY | Facility: CLINIC | Age: 22
End: 2022-06-07

## 2022-06-07 VITALS — SYSTOLIC BLOOD PRESSURE: 134 MMHG | WEIGHT: 221 LBS | DIASTOLIC BLOOD PRESSURE: 83 MMHG | BODY MASS INDEX: 39.15 KG/M2

## 2022-06-07 VITALS — WEIGHT: 221 LBS | HEIGHT: 63 IN | BODY MASS INDEX: 39.16 KG/M2

## 2022-06-07 DIAGNOSIS — F41.9 ANXIETY DISORDER AFFECTING PREGNANCY, ANTEPARTUM: ICD-10-CM

## 2022-06-07 DIAGNOSIS — J45.909 ASTHMA AFFECTING PREGNANCY, ANTEPARTUM: ICD-10-CM

## 2022-06-07 DIAGNOSIS — Z34.80 SUPERVISION OF OTHER NORMAL PREGNANCY, ANTEPARTUM: Primary | ICD-10-CM

## 2022-06-07 DIAGNOSIS — O99.340 ANXIETY DISORDER AFFECTING PREGNANCY, ANTEPARTUM: ICD-10-CM

## 2022-06-07 DIAGNOSIS — O99.519 ASTHMA AFFECTING PREGNANCY, ANTEPARTUM: ICD-10-CM

## 2022-06-07 DIAGNOSIS — O99.210 OBESITY AFFECTING PREGNANCY, ANTEPARTUM: ICD-10-CM

## 2022-06-07 LAB
GLUCOSE UR STRIP-MCNC: NEGATIVE MG/DL
PROT UR STRIP-MCNC: NEGATIVE MG/DL

## 2022-06-07 PROCEDURE — G0463 HOSPITAL OUTPT CLINIC VISIT: HCPCS

## 2022-06-07 PROCEDURE — 99214 OFFICE O/P EST MOD 30 MIN: CPT | Performed by: OBSTETRICS & GYNECOLOGY

## 2022-06-07 PROCEDURE — 59025 FETAL NON-STRESS TEST: CPT

## 2022-06-07 NOTE — NON STRESS TEST
"Obstetrical Non-stress Test Interpretation     Name:  Iftikhar Russell  MRN: [id number]    21 y.o. female  at 35w2d    Indication: decreased fetal movement      Fetal Assessment  Fetal Movement: active  Fetal HR Assessment Method: external  Fetal HR (beats/min): 145  Fetal HR Baseline: normal range  Fetal HR Variability: moderate (amplitude range 6 to 25 bpm)  Fetal HR Accelerations: greater than/equal to 15 bpm, lasting at least 15 seconds  Fetal HR Decelerations: absent    Ht 160 cm (63\")   Wt 100 kg (221 lb)   LMP  (LMP Unknown)   BMI 39.15 kg/m²     Reason for test: Decreased fetal movement  Date of Test: 2022  Time frame of test: 1672-5593  RN NST Interpretation: Radha Hopkins  2022  18:06 EDT  "

## 2022-06-07 NOTE — NURSING NOTE
Updated dr garnica that patient presented to triage with complaints of decreased fetal movement. Patient is a  at 35.2 weeks gestation. Patient has been on monitor since approx 1600 and has felt 2 movements. fhr is reactive. Patient has been given juice and peanut butter crackers. Patients last u/s shows anterior placenta. Dr garnica ordered patient can be discharged with education on how to track kick counts.

## 2022-06-07 NOTE — ASSESSMENT & PLAN NOTE
Continue prenatal vitamins  Fetal kick counts   labor warnings  GBS next office visit  Discussed if baby's movements do not  throughout the day then she is to come in for fetal monitoring

## 2022-06-07 NOTE — PROGRESS NOTES
OB FOLLOW UP    CC: Scheduled OB routine FU       Prenatal care complicated by:   Patient Active Problem List   Diagnosis   • Asthma   • Supervision of other normal pregnancy, antepartum   • Obesity affecting pregnancy, antepartum   • Anxiety disorder affecting pregnancy, antepartum   • Asthma affecting pregnancy, antepartum   • Postural dizziness with presyncope   • Other microscopic hematuria   • Chronic right-sided low back pain without sciatica       Subjective:   Patient has: No complaints, No leaking fluid, No vaginal bleeding, No contractions, Adequate FM  Patient reports baby is not moved as much this morning it is moving.  Also reports that her back pain is much better.    Objective:  Urine glucose/protein- see flow sheet      /83   Wt 100 kg (221 lb)   LMP  (LMP Unknown)   BMI 39.15 kg/m²   See OB flow for LE edema, and cvx exam if applicable  FHT: 142 BPM   Uterine Size: 35 cm      Assessment and Plan:  Diagnoses and all orders for this visit:    1. Supervision of other normal pregnancy, antepartum (Primary)  Overview:  EDC: 7/10/2022    Nips: Negative    Asthma  Obesity  Anxiety    COVID-19 vaccine: Complete  Flu vaccine: Recommended  Tdap vaccine:    2022 EFWE 1948 (53.9) JANETTE wnl. Vertex    Assessment & Plan:  Continue prenatal vitamins  Fetal kick counts   labor warnings  GBS next office visit  Discussed if baby's movements do not  throughout the day then she is to come in for fetal monitoring    Orders:  -     POC Urinalysis Dipstick    2. Anxiety disorder affecting pregnancy, antepartum  Overview:  Zoloft 50 mg daily  22 - increased work stress due to being asked to work overtime, note given for 8 hours/day.    Assessment & Plan:  Symptoms stable.  Continue Zoloft.      3. Asthma affecting pregnancy, antepartum  Overview:  Albuterol MDI    Assessment & Plan:  Continue albuterol as needed          4. Obesity affecting pregnancy, antepartum  Assessment & Plan:  Discussed  exercise, nutrition and appropriate weight gain in pregnancy          35w2d  Reassuring pregnancy progress    Counseling: OB precautions, leaking, VB, kalen gotti vs PTL/Labor  [facility]    Questions answered    Return in about 1 week (around 6/14/2022) for Recheck.      Jluis Aragon MD  06/07/2022

## 2022-06-07 NOTE — NON STRESS TEST
Obstetrical Non-stress Test Interpretation     Name:  Iftikhar Russell  MRN: [id number]    21 y.o. female  at 35w2d    Indication: EGA 35W2D; decreased fetal movement      Baseline: Normal 110-160 bpm  Variability:   Moderate/Normal (amplitude 6-25 bpm)  Accelerations: Present (32 weeks+) 15 x 15 bpm  Decelerations: Absent   Contractions:  Absent       Impression:  Category I       Juleita Bergeron MD  2022  17:09 EDT

## 2022-06-15 ENCOUNTER — ROUTINE PRENATAL (OUTPATIENT)
Dept: OBSTETRICS AND GYNECOLOGY | Facility: CLINIC | Age: 22
End: 2022-06-15

## 2022-06-15 VITALS — SYSTOLIC BLOOD PRESSURE: 128 MMHG | DIASTOLIC BLOOD PRESSURE: 84 MMHG | BODY MASS INDEX: 39.5 KG/M2 | WEIGHT: 223 LBS

## 2022-06-15 DIAGNOSIS — Z34.80 SUPERVISION OF OTHER NORMAL PREGNANCY, ANTEPARTUM: Primary | ICD-10-CM

## 2022-06-15 DIAGNOSIS — R31.29 OTHER MICROSCOPIC HEMATURIA: ICD-10-CM

## 2022-06-15 LAB
GLUCOSE UR STRIP-MCNC: NEGATIVE MG/DL
PROT UR STRIP-MCNC: NEGATIVE MG/DL

## 2022-06-15 PROCEDURE — 87081 CULTURE SCREEN ONLY: CPT | Performed by: STUDENT IN AN ORGANIZED HEALTH CARE EDUCATION/TRAINING PROGRAM

## 2022-06-15 PROCEDURE — 99213 OFFICE O/P EST LOW 20 MIN: CPT | Performed by: STUDENT IN AN ORGANIZED HEALTH CARE EDUCATION/TRAINING PROGRAM

## 2022-06-15 NOTE — PROGRESS NOTES
OB FOLLOW UP  Complaint   Chief Complaint   Patient presents with   • Routine Prenatal Visit            Iftikhar Russell is a 21 y.o.  36w3d patient being seen today for her obstetrical follow up visit. Patient denies decreased fetal movement, contractions, loss of fluid or.  Has noted some vaginal spotting however not causing her any distress.     Her prenatal care is complicated by (and status) :    Patient Active Problem List   Diagnosis   • Asthma   • Supervision of other normal pregnancy, antepartum   • Obesity affecting pregnancy, antepartum   • Anxiety disorder affecting pregnancy, antepartum   • Asthma affecting pregnancy, antepartum   • Postural dizziness with presyncope   • Other microscopic hematuria   • Chronic right-sided low back pain without sciatica       All other systems reviewed and are negative.     The additional following portions of the patient's history were reviewed and updated as appropriate: allergies, current medications, past family history, past medical history, past social history, past surgical history and problem list.      EXAM:     Vital signs: /84   Wt 101 kg (223 lb)   LMP  (LMP Unknown)   BMI 39.50 kg/m²   Appearance/psychiatric: To be in no distress  Constitutional: The patient is well nourished.  Cardiovascular: She does not have edema.  Respiratory: Respiratory effort is normal.  Gastrointestinal: Abdomen is soft, gravid, nontender, no rashes, heart tones are present, fundal height is size equals dates    Pelvic Exam: Yes.  Presentation: cephalic. Dilation: Closed. Effacement: 50%. Station: -3.    Urine glucose/protein: See prenatal flowsheet       Assessment and Plan    Problem List Items Addressed This Visit        Genitourinary and Reproductive     Other microscopic hematuria    Overview     2022 r sided flank pain, acute onset, urine dip moderate blood, straight cath 0-2 RBC; no WBC. Renal U/S unremarkable ; suspect small stone vs. Bladder  irritation in pregnancy; strain urine. Tylenol as needed.               Gravid and     Supervision of other normal pregnancy, antepartum - Primary    Overview     EDC: 7/10/2022    Nips: Negative    Asthma  Obesity  Anxiety    COVID-19 vaccine: Complete  Flu vaccine: Recommended  Tdap vaccine:    2022 EFWE 1948 (53.9) JANETTE wnl. Vertex           Relevant Orders    POC Urinalysis Dipstick (Completed)    Group B Streptococcus Culture - Swab, Vaginal/Rectum          Impression  1. Pregnancy at 36w3d  2. Fetal status reassuring.   3. Activity and Exercise discussed.    Plan  1.  GBS collected today  2.  Follow-up 1 week  3.  Do not recommend traveling this late in pregnancy.      Patient was counseled to the following pregnancy precautions:  • Decreased fetal movement, if concern for decreased fetal movement please perform fetal kick counts you are looking for 10 movements in 2 hours.  If concern for fetal movement and not meeting that criteria, please present to triage for evaluation.  • Contractions occurring every 5 minutes for over an hour, lasting 30 to 60 seconds and progressively causing more discomfort, please seek medical attention to rule out labor  • If you believe that your water is broken, place a sanitary pad.  If pad fills in short period of time i.e. less than 5 minutes, take off pad placed another pad.  If this is saturated please present for rule out rupture of membranes  • Vaginal bleeding can be normal in pregnancy, this usually takes a form of spotting.  If having heavier bleeding like a menstrual period please present for evaluation; especially in light of severe abdominal pain this could represent a placental abruption.  • Keep all scheduled appointments as recommended.        Clement Khan MD  06/15/2022

## 2022-06-17 LAB — BACTERIA SPEC AEROBE CULT: NORMAL

## 2022-06-18 ENCOUNTER — HOSPITAL ENCOUNTER (OUTPATIENT)
Facility: HOSPITAL | Age: 22
Discharge: HOME | End: 2022-06-18
Attending: OBSTETRICS & GYNECOLOGY | Admitting: OBSTETRICS & GYNECOLOGY

## 2022-06-18 VITALS
SYSTOLIC BLOOD PRESSURE: 96 MMHG | RESPIRATION RATE: 18 BRPM | TEMPERATURE: 98.5 F | OXYGEN SATURATION: 98 % | HEART RATE: 89 BPM | DIASTOLIC BLOOD PRESSURE: 47 MMHG

## 2022-06-18 LAB
ALBUMIN SERPL-MCNC: 3.4 G/DL (ref 3.5–5.2)
ALBUMIN/GLOB SERPL: 1.1 G/DL
ALP SERPL-CCNC: 159 U/L (ref 39–117)
ALT SERPL W P-5'-P-CCNC: 32 U/L (ref 1–33)
ANION GAP SERPL CALCULATED.3IONS-SCNC: 12.9 MMOL/L (ref 5–15)
AST SERPL-CCNC: 20 U/L (ref 1–32)
BASOPHILS # BLD AUTO: 0.03 10*3/MM3 (ref 0–0.2)
BASOPHILS NFR BLD AUTO: 0.4 % (ref 0–1.5)
BILIRUB BLD-MCNC: NEGATIVE MG/DL
BILIRUB SERPL-MCNC: 0.2 MG/DL (ref 0–1.2)
BUN SERPL-MCNC: 7 MG/DL (ref 6–20)
BUN/CREAT SERPL: 11.1 (ref 7–25)
CALCIUM SPEC-SCNC: 9.1 MG/DL (ref 8.6–10.5)
CHLORIDE SERPL-SCNC: 102 MMOL/L (ref 98–107)
CLARITY, POC: CLEAR
CO2 SERPL-SCNC: 19.1 MMOL/L (ref 22–29)
COLOR UR: YELLOW
CREAT SERPL-MCNC: 0.63 MG/DL (ref 0.57–1)
CREAT UR-MCNC: 156.5 MG/DL
DEPRECATED RDW RBC AUTO: 47.4 FL (ref 37–54)
EGFRCR SERPLBLD CKD-EPI 2021: 128 ML/MIN/1.73
EOSINOPHIL # BLD AUTO: 0.29 10*3/MM3 (ref 0–0.4)
EOSINOPHIL NFR BLD AUTO: 3.9 % (ref 0.3–6.2)
ERYTHROCYTE [DISTWIDTH] IN BLOOD BY AUTOMATED COUNT: 15 % (ref 12.3–15.4)
GLOBULIN UR ELPH-MCNC: 3 GM/DL
GLUCOSE SERPL-MCNC: 162 MG/DL (ref 65–99)
GLUCOSE UR STRIP-MCNC: NEGATIVE MG/DL
HCT VFR BLD AUTO: 36.1 % (ref 34–46.6)
HGB BLD-MCNC: 11.6 G/DL (ref 12–15.9)
IMM GRANULOCYTES # BLD AUTO: 0.01 10*3/MM3 (ref 0–0.05)
IMM GRANULOCYTES NFR BLD AUTO: 0.1 % (ref 0–0.5)
KETONES UR QL: ABNORMAL
LEUKOCYTE EST, POC: NEGATIVE
LYMPHOCYTES # BLD AUTO: 1.36 10*3/MM3 (ref 0.7–3.1)
LYMPHOCYTES NFR BLD AUTO: 18.4 % (ref 19.6–45.3)
MCH RBC QN AUTO: 27.6 PG (ref 26.6–33)
MCHC RBC AUTO-ENTMCNC: 32.1 G/DL (ref 31.5–35.7)
MCV RBC AUTO: 85.7 FL (ref 79–97)
MONOCYTES # BLD AUTO: 0.48 10*3/MM3 (ref 0.1–0.9)
MONOCYTES NFR BLD AUTO: 6.5 % (ref 5–12)
NEUTROPHILS NFR BLD AUTO: 5.22 10*3/MM3 (ref 1.7–7)
NEUTROPHILS NFR BLD AUTO: 70.7 % (ref 42.7–76)
NITRITE UR-MCNC: NEGATIVE MG/ML
NRBC BLD AUTO-RTO: 0 /100 WBC (ref 0–0.2)
PH UR: 5.5 [PH] (ref 5–8)
PLATELET # BLD AUTO: 210 10*3/MM3 (ref 140–450)
PMV BLD AUTO: 12 FL (ref 6–12)
POTASSIUM SERPL-SCNC: 3.5 MMOL/L (ref 3.5–5.2)
PROT ?TM UR-MCNC: 17.7 MG/DL
PROT SERPL-MCNC: 6.4 G/DL (ref 6–8.5)
PROT UR STRIP-MCNC: NEGATIVE MG/DL
PROT/CREAT UR: 0.11 MG/G{CREAT}
RBC # BLD AUTO: 4.21 10*6/MM3 (ref 3.77–5.28)
RBC # UR STRIP: ABNORMAL /UL
SODIUM SERPL-SCNC: 134 MMOL/L (ref 136–145)
SP GR UR: 1.03 (ref 1–1.03)
UROBILINOGEN UR QL: NORMAL
WBC NRBC COR # BLD: 7.39 10*3/MM3 (ref 3.4–10.8)

## 2022-06-18 PROCEDURE — 84156 ASSAY OF PROTEIN URINE: CPT | Performed by: OBSTETRICS & GYNECOLOGY

## 2022-06-18 PROCEDURE — 85025 COMPLETE CBC W/AUTO DIFF WBC: CPT | Performed by: OBSTETRICS & GYNECOLOGY

## 2022-06-18 PROCEDURE — 81002 URINALYSIS NONAUTO W/O SCOPE: CPT | Performed by: OBSTETRICS & GYNECOLOGY

## 2022-06-18 PROCEDURE — 80053 COMPREHEN METABOLIC PANEL: CPT | Performed by: OBSTETRICS & GYNECOLOGY

## 2022-06-18 PROCEDURE — 59025 FETAL NON-STRESS TEST: CPT | Performed by: OBSTETRICS & GYNECOLOGY

## 2022-06-18 PROCEDURE — G0463 HOSPITAL OUTPT CLINIC VISIT: HCPCS

## 2022-06-18 PROCEDURE — 82570 ASSAY OF URINE CREATININE: CPT | Performed by: OBSTETRICS & GYNECOLOGY

## 2022-06-18 PROCEDURE — 59025 FETAL NON-STRESS TEST: CPT

## 2022-06-18 NOTE — NURSING NOTE
Called Dr Weiss with triage report at 1849. Cervix exam and elevated blood pressures.  Orders for lab and to recheck cervix when labs are back.

## 2022-06-19 NOTE — NON STRESS TEST
Obstetrical Non-stress Test Interpretation     Name:  Iftikhar Russell  MRN: [id number]    21 y.o. female  at 36w6d    Indication: contractions, one elevated B/P.      Fetal Assessment  Fetal Movement: active  Fetal HR Assessment Method: external  Fetal HR (beats/min): 145  Fetal HR Baseline: normal range  Fetal HR Variability: moderate (amplitude range 6 to 25 bpm)  Fetal HR Accelerations: episodic, greater than/equal to 15 bpm  Fetal HR Decelerations: absent  Sinusoidal Pattern Present: absent    BP 96/47   Pulse 89   Temp 98.5 °F (36.9 °C) (Oral)   Resp 18   LMP  (LMP Unknown)   SpO2 98%     Reason for test:  contractions, and one elevated B/P.  Date of Test: 2022  Time frame of test:   RN NST Interpretation:  reactive for gestational age.      Janelle Smith RN  2022  22:51 EDT

## 2022-06-20 ENCOUNTER — HOSPITAL ENCOUNTER (OUTPATIENT)
Facility: HOSPITAL | Age: 22
Discharge: HOME | End: 2022-06-20
Attending: STUDENT IN AN ORGANIZED HEALTH CARE EDUCATION/TRAINING PROGRAM | Admitting: STUDENT IN AN ORGANIZED HEALTH CARE EDUCATION/TRAINING PROGRAM

## 2022-06-20 ENCOUNTER — PREP FOR SURGERY (OUTPATIENT)
Dept: OTHER | Facility: HOSPITAL | Age: 22
End: 2022-06-20

## 2022-06-20 ENCOUNTER — ROUTINE PRENATAL (OUTPATIENT)
Dept: OBSTETRICS AND GYNECOLOGY | Facility: CLINIC | Age: 22
End: 2022-06-20

## 2022-06-20 VITALS — SYSTOLIC BLOOD PRESSURE: 125 MMHG | WEIGHT: 222 LBS | DIASTOLIC BLOOD PRESSURE: 83 MMHG | BODY MASS INDEX: 39.33 KG/M2

## 2022-06-20 VITALS — SYSTOLIC BLOOD PRESSURE: 124 MMHG | RESPIRATION RATE: 18 BRPM | DIASTOLIC BLOOD PRESSURE: 70 MMHG | HEART RATE: 86 BPM

## 2022-06-20 DIAGNOSIS — J45.909 ASTHMA AFFECTING PREGNANCY, ANTEPARTUM: ICD-10-CM

## 2022-06-20 DIAGNOSIS — O99.210 OBESITY AFFECTING PREGNANCY, ANTEPARTUM: ICD-10-CM

## 2022-06-20 DIAGNOSIS — O99.213 PREGNANCY COMPLICATED BY OBESITY, THIRD TRIMESTER: Primary | ICD-10-CM

## 2022-06-20 DIAGNOSIS — O99.519 ASTHMA AFFECTING PREGNANCY, ANTEPARTUM: ICD-10-CM

## 2022-06-20 DIAGNOSIS — R31.29 OTHER MICROSCOPIC HEMATURIA: ICD-10-CM

## 2022-06-20 DIAGNOSIS — F41.9 ANXIETY DISORDER AFFECTING PREGNANCY, ANTEPARTUM: ICD-10-CM

## 2022-06-20 DIAGNOSIS — O99.340 ANXIETY DISORDER AFFECTING PREGNANCY, ANTEPARTUM: ICD-10-CM

## 2022-06-20 DIAGNOSIS — O36.8130 DECREASED FETAL MOVEMENTS IN THIRD TRIMESTER, SINGLE OR UNSPECIFIED FETUS: ICD-10-CM

## 2022-06-20 DIAGNOSIS — Z34.80 SUPERVISION OF OTHER NORMAL PREGNANCY, ANTEPARTUM: Primary | ICD-10-CM

## 2022-06-20 PROBLEM — R42 POSTURAL DIZZINESS WITH PRESYNCOPE: Status: RESOLVED | Noted: 2022-03-23 | Resolved: 2022-06-20

## 2022-06-20 PROBLEM — R55 POSTURAL DIZZINESS WITH PRESYNCOPE: Status: RESOLVED | Noted: 2022-03-23 | Resolved: 2022-06-20

## 2022-06-20 PROBLEM — M54.50 CHRONIC RIGHT-SIDED LOW BACK PAIN WITHOUT SCIATICA: Status: RESOLVED | Noted: 2022-05-18 | Resolved: 2022-06-20

## 2022-06-20 PROBLEM — G89.29 CHRONIC RIGHT-SIDED LOW BACK PAIN WITHOUT SCIATICA: Status: RESOLVED | Noted: 2022-05-18 | Resolved: 2022-06-20

## 2022-06-20 LAB
GLUCOSE UR STRIP-MCNC: NEGATIVE MG/DL
PROT UR STRIP-MCNC: ABNORMAL MG/DL

## 2022-06-20 PROCEDURE — G0463 HOSPITAL OUTPT CLINIC VISIT: HCPCS

## 2022-06-20 PROCEDURE — 99214 OFFICE O/P EST MOD 30 MIN: CPT | Performed by: OBSTETRICS & GYNECOLOGY

## 2022-06-20 PROCEDURE — 59025 FETAL NON-STRESS TEST: CPT

## 2022-06-20 PROCEDURE — 59025 FETAL NON-STRESS TEST: CPT | Performed by: STUDENT IN AN ORGANIZED HEALTH CARE EDUCATION/TRAINING PROGRAM

## 2022-06-20 NOTE — PROGRESS NOTES
OB FOLLOW UP    CC: Scheduled OB routine FU     Prenatal care complicated by:   Patient Active Problem List   Diagnosis   • Asthma   • Supervision of other normal pregnancy, antepartum   • Obesity affecting pregnancy, antepartum   • Anxiety disorder affecting pregnancy, antepartum   • Asthma affecting pregnancy, antepartum   • Other microscopic hematuria   • Decreased fetal movements in third trimester       Subjective:   Patient has: No leaking fluid, No vaginal bleeding  The patient reports that she has increased contractions over the weekend.  She was seen on labor and delivery and was 2 cm dilated.  She reports decreased fetal movements today.  She reports the last time she felt good movements was yesterday afternoon.    Objective:  Urine glucose/protein- see flow sheet      /83   Wt 101 kg (222 lb)   LMP  (LMP Unknown)   BMI 39.33 kg/m²   See OB flow for LE edema, and cvx exam if applicable  FHT: 144 BPM   Uterine Size: 37 cm      Assessment and Plan:  Diagnoses and all orders for this visit:    1. Supervision of other normal pregnancy, antepartum (Primary)  Overview:  EDC: 7/10/2022    Nips: Negative    Asthma  Obesity  Anxiety    COVID-19 vaccine: Complete  Flu vaccine: Recommended  Tdap vaccine:    5/18/2022 EFWE 1948 (53.9) JANETTE wnl. Vertex    Assessment & Plan:  GBS negative  Continue prenatal vitamins  Fetal kick counts  Labor instructions    Orders:  -     POC Urinalysis Dipstick    2. Obesity affecting pregnancy, antepartum  Assessment & Plan:  Jazlyn exercise, nutrition and appropriate weight gain in pregnancy.  Plan delivery between 39 and 40 weeks gestation.  My induction of labor counseling      3. Asthma affecting pregnancy, antepartum  Overview:  Albuterol MDI    Assessment & Plan:  Stable.  Continue albuterol MDI as needed          4. Anxiety disorder affecting pregnancy, antepartum  Overview:  Zoloft 50 mg daily  5/2/22 - increased work stress due to being asked to work overtime, note  given for 8 hours/day.      5. Other microscopic hematuria  Overview:  4/11/2022 r sided flank pain, acute onset, urine dip moderate blood, straight cath 0-2 RBC; no WBC. Renal U/S unremarkable ; suspect small stone vs. Bladder irritation in pregnancy; strain urine. Tylenol as needed.       6. Decreased fetal movements in third trimester, single or unspecified fetus  Assessment & Plan:  Strict fetal kick counts.  To L&D for fetal monitoring/NST.          37w1d  Reassuring pregnancy progress    Counseling: OB precautions, leaking, VB, kalen gotti vs PTL/Labor  FKC    Questions answered    Return in about 1 week (around 6/27/2022) for Recheck.      Jluis Aragon MD  06/20/2022

## 2022-06-20 NOTE — ASSESSMENT & PLAN NOTE
Jazlyn exercise, nutrition and appropriate weight gain in pregnancy.  Plan delivery between 39 and 40 weeks gestation.  My induction of labor counseling

## 2022-06-24 ENCOUNTER — HOSPITAL ENCOUNTER (OUTPATIENT)
Facility: HOSPITAL | Age: 22
Discharge: HOME | End: 2022-06-25
Attending: OBSTETRICS & GYNECOLOGY | Admitting: OBSTETRICS & GYNECOLOGY

## 2022-06-24 VITALS
OXYGEN SATURATION: 97 % | TEMPERATURE: 98.3 F | RESPIRATION RATE: 20 BRPM | DIASTOLIC BLOOD PRESSURE: 77 MMHG | WEIGHT: 222 LBS | SYSTOLIC BLOOD PRESSURE: 138 MMHG | BODY MASS INDEX: 39.34 KG/M2 | HEART RATE: 88 BPM | HEIGHT: 63 IN

## 2022-06-24 PROCEDURE — G0463 HOSPITAL OUTPT CLINIC VISIT: HCPCS

## 2022-06-25 VITALS — WEIGHT: 222 LBS | BODY MASS INDEX: 39.34 KG/M2 | HEIGHT: 63 IN | TEMPERATURE: 99.2 F

## 2022-06-25 PROBLEM — O21.9 NAUSEA AND VOMITING DURING PREGNANCY: Status: ACTIVE | Noted: 2022-06-25

## 2022-06-25 LAB
ALBUMIN SERPL-MCNC: 3.4 G/DL (ref 3.5–5.2)
ALBUMIN/GLOB SERPL: 1.1 G/DL
ALP SERPL-CCNC: 165 U/L (ref 39–117)
ALT SERPL W P-5'-P-CCNC: 18 U/L (ref 1–33)
ANION GAP SERPL CALCULATED.3IONS-SCNC: 12 MMOL/L (ref 5–15)
AST SERPL-CCNC: 16 U/L (ref 1–32)
BILIRUB SERPL-MCNC: <0.2 MG/DL (ref 0–1.2)
BUN SERPL-MCNC: 10 MG/DL (ref 6–20)
BUN/CREAT SERPL: 19.2 (ref 7–25)
CALCIUM SPEC-SCNC: 9.4 MG/DL (ref 8.6–10.5)
CHLORIDE SERPL-SCNC: 103 MMOL/L (ref 98–107)
CO2 SERPL-SCNC: 22 MMOL/L (ref 22–29)
CREAT SERPL-MCNC: 0.52 MG/DL (ref 0.57–1)
DEPRECATED RDW RBC AUTO: 46.6 FL (ref 37–54)
EGFRCR SERPLBLD CKD-EPI 2021: 134.1 ML/MIN/1.73
ERYTHROCYTE [DISTWIDTH] IN BLOOD BY AUTOMATED COUNT: 15.1 % (ref 12.3–15.4)
GLOBULIN UR ELPH-MCNC: 3 GM/DL
GLUCOSE SERPL-MCNC: 143 MG/DL (ref 65–99)
HCT VFR BLD AUTO: 36 % (ref 34–46.6)
HGB BLD-MCNC: 11.5 G/DL (ref 12–15.9)
LEUKOCYTE EST, POC: ABNORMAL
MCH RBC QN AUTO: 27.2 PG (ref 26.6–33)
MCHC RBC AUTO-ENTMCNC: 31.9 G/DL (ref 31.5–35.7)
MCV RBC AUTO: 85.1 FL (ref 79–97)
PLATELET # BLD AUTO: 210 10*3/MM3 (ref 140–450)
PMV BLD AUTO: 12 FL (ref 6–12)
POTASSIUM SERPL-SCNC: 4 MMOL/L (ref 3.5–5.2)
PROT SERPL-MCNC: 6.4 G/DL (ref 6–8.5)
RBC # BLD AUTO: 4.23 10*6/MM3 (ref 3.77–5.28)
SODIUM SERPL-SCNC: 137 MMOL/L (ref 136–145)
WBC NRBC COR # BLD: 6.74 10*3/MM3 (ref 3.4–10.8)

## 2022-06-25 PROCEDURE — 59025 FETAL NON-STRESS TEST: CPT

## 2022-06-25 PROCEDURE — 59025 FETAL NON-STRESS TEST: CPT | Performed by: OBSTETRICS & GYNECOLOGY

## 2022-06-25 PROCEDURE — G0463 HOSPITAL OUTPT CLINIC VISIT: HCPCS

## 2022-06-25 PROCEDURE — 80053 COMPREHEN METABOLIC PANEL: CPT | Performed by: OBSTETRICS & GYNECOLOGY

## 2022-06-25 PROCEDURE — 85027 COMPLETE CBC AUTOMATED: CPT | Performed by: OBSTETRICS & GYNECOLOGY

## 2022-06-25 PROCEDURE — 96374 THER/PROPH/DIAG INJ IV PUSH: CPT

## 2022-06-25 PROCEDURE — 25010000002 ONDANSETRON PER 1 MG: Performed by: OBSTETRICS & GYNECOLOGY

## 2022-06-25 PROCEDURE — 81002 URINALYSIS NONAUTO W/O SCOPE: CPT | Performed by: OBSTETRICS & GYNECOLOGY

## 2022-06-25 RX ORDER — SODIUM CHLORIDE 0.9 % (FLUSH) 0.9 %
3 SYRINGE (ML) INJECTION EVERY 12 HOURS SCHEDULED
Status: DISCONTINUED | OUTPATIENT
Start: 2022-06-25 | End: 2022-06-25 | Stop reason: HOSPADM

## 2022-06-25 RX ORDER — SODIUM CHLORIDE, SODIUM LACTATE, POTASSIUM CHLORIDE, CALCIUM CHLORIDE 600; 310; 30; 20 MG/100ML; MG/100ML; MG/100ML; MG/100ML
150 INJECTION, SOLUTION INTRAVENOUS CONTINUOUS
Status: DISCONTINUED | OUTPATIENT
Start: 2022-06-25 | End: 2022-06-25 | Stop reason: HOSPADM

## 2022-06-25 RX ORDER — ONDANSETRON 2 MG/ML
4 INJECTION INTRAMUSCULAR; INTRAVENOUS EVERY 6 HOURS PRN
Status: DISCONTINUED | OUTPATIENT
Start: 2022-06-25 | End: 2022-06-25 | Stop reason: HOSPADM

## 2022-06-25 RX ORDER — LIDOCAINE HYDROCHLORIDE 10 MG/ML
5 INJECTION, SOLUTION EPIDURAL; INFILTRATION; INTRACAUDAL; PERINEURAL AS NEEDED
Status: DISCONTINUED | OUTPATIENT
Start: 2022-06-25 | End: 2022-06-25 | Stop reason: HOSPADM

## 2022-06-25 RX ORDER — SODIUM CHLORIDE 0.9 % (FLUSH) 0.9 %
10 SYRINGE (ML) INJECTION AS NEEDED
Status: DISCONTINUED | OUTPATIENT
Start: 2022-06-25 | End: 2022-06-25 | Stop reason: HOSPADM

## 2022-06-25 RX ADMIN — ONDANSETRON 4 MG: 2 INJECTION INTRAMUSCULAR; INTRAVENOUS at 19:42

## 2022-06-25 RX ADMIN — SODIUM CHLORIDE, POTASSIUM CHLORIDE, SODIUM LACTATE AND CALCIUM CHLORIDE 1000 ML: 600; 310; 30; 20 INJECTION, SOLUTION INTRAVENOUS at 19:41

## 2022-06-25 NOTE — NURSING NOTE
Discharge instructions explained to patient and significant other with no questions. Patient discharged from unit undelivered in stable condition via own vehicle.

## 2022-06-25 NOTE — NON STRESS TEST
Obstetrical Non-stress Test Interpretation     Name:  Iftikhar Russell  MRN: [id number]    21 y.o. female  at 37w6d    Indication: 37 weeks 6 days gestation; evaluate for labor      Baseline: Normal 110-160 bpm  Variability:   Moderate/Normal (amplitude 6-25 bpm)  Accelerations: Present (32 weeks+) 15 x 15 bpm  Decelerations: Absent   Contractions:  Present       Impression:  Category I  and No cervical change after period of observation      Julieta Bergeron MD  2022  03:16 EDT     Back Pain

## 2022-06-25 NOTE — NURSING NOTE
"Obstetrical Non-stress Test Interpretation     Name:  Iftikhar Russell  MRN: [id number]    21 y.o. female  at 37w6d    Indication: Contractions      Fetal Assessment  Fetal Movement: active  Fetal HR Assessment Method: external  Fetal HR (beats/min): 130  Fetal HR Baseline: normal range  Fetal HR Variability: moderate (amplitude range 6 to 25 bpm)  Fetal HR Accelerations: lasting at least 15 seconds, greater than/equal to 15 bpm, episodic  Fetal HR Decelerations: absent  Sinusoidal Pattern Present: absent    /77 (BP Location: Right arm, Patient Position: Sitting)   Pulse 88   Temp 98.3 °F (36.8 °C) (Oral)   Resp 20   Ht 160 cm (63\")   Wt 101 kg (222 lb)   LMP  (LMP Unknown)   SpO2 97%   BMI 39.33 kg/m²     Reason for test: OB Triage, Other (Comment) (Contractions)  Date of Test: 2022  Time frame of test:   RN NST Interpretation: ALANNA Luna  2022  00:29 EDT  "

## 2022-06-25 NOTE — NURSING NOTE
at 37w5d presents to L&D via with complaints of contractions. Placed on US and [place]. Abdomen palpates soft&non tender. Patient denies any vaginal bleeding or leaking of fluid. Contractions palpate mild and SVE is 2/60-70%/-2.

## 2022-06-25 NOTE — NON STRESS TEST
"MILY Bull   OB NST Note    2022   Name:  Iftikhar Russell  MRN: [id number]    Subjective:  21 y.o.  at 37w6d who presents to labor and delivery complaining of nausea vomiting all day.  She reports she is not been able to hold anything down.  She reports that she is having some lower abdominal pain and cramping.  She complains of some decreased fetal movement but still feels some movements.  She denies any vaginal bleeding or loss of fluid.    Indication: Nausea/Vomiting and Decreased FM    NST:   Baseline: 140  Variability:   Moderate/Normal (amplitude 6-25 bpm)  Accelerations: Present (32 weeks+) 15 x 15 bpm  Decelerations: Absent   Contractions:  Not regular    NST interpretation: reactive    Documented Vitals    22   Temp: 99.2 °F (37.3 °C)    Weight:  101 kg (222 lb)   Height:  160 cm (63\")         Lab Results (last 24 hours)     Procedure Component Value Units Date/Time    CBC (No Diff) [[id number]]  (Abnormal) Collected: 22    Specimen: Blood Updated: 22     WBC 6.74 10*3/mm3      RBC 4.23 10*6/mm3      Hemoglobin 11.5 g/dL      Hematocrit 36.0 %      MCV 85.1 fL      MCH 27.2 pg      MCHC 31.9 g/dL      RDW 15.1 %      RDW-SD 46.6 fl      MPV 12.0 fL      Platelets 210 10*3/mm3     Comprehensive Metabolic Panel [[id number]]  (Abnormal) Collected: 22    Specimen: Blood Updated: 22     Glucose 143 mg/dL      BUN 10 mg/dL      Creatinine 0.52 mg/dL      Sodium 137 mmol/L      Potassium 4.0 mmol/L      Chloride 103 mmol/L      CO2 22.0 mmol/L      Calcium 9.4 mg/dL      Total Protein 6.4 g/dL      Albumin 3.40 g/dL      ALT (SGPT) 18 U/L      AST (SGOT) 16 U/L      Alkaline Phosphatase 165 U/L      Total Bilirubin <0.2 mg/dL      Globulin 3.0 gm/dL      A/G Ratio 1.1 g/dL      BUN/Creatinine Ratio 19.2     Anion Gap 12.0 mmol/L      eGFR 134.1 mL/min/1.73      Comment: National Kidney Foundation and American Society of Nephrology " (ASN) Task Force recommended calculation based on the Chronic Kidney Disease Epidemiology Collaboration (CKD-EPI) equation refit without adjustment for race.       Narrative:      GFR Normal >60  Chronic Kidney Disease <60  Kidney Failure <15      POC Urinalysis Dipstick [[id number]]  (Abnormal) Collected: 22    Specimen: Urine Updated: 22     Leukocytes Trace           Cervical Exam:  2/60%/-2 per nursing exam.  No cervical change was noted during triage observation, or since 2022.    Assessment:  21 y.o.  AT 37w6d  Nausea/Vomiting and Decreased FM    Plan:   OB Precautions, FKC, Keep scheduled NSTs, Keep office visit, Keep scheduled IOL  The patient's laboratories are reassuring.  She received 1 L of IV fluids and IV Zofran.  She felt much better after the fluids and the Zofran.  She was able to tolerate p.o. prior to her discharge.  She was discharged with reassuring vital signs, reactive NST, feeling better, irregular contractions and unchanged cervix.    Electronically signed by Jluis Aragon MD, 22, 7:27 PM EDT.

## 2022-06-26 NOTE — NURSING NOTE
"Obstetrical Non-stress Test Interpretation     Name:  Iftikhar Russell  MRN: [id number]    21 y.o. female  at 37w6d    Indication: Nausea, vomiting, decreased fetal movement, and lower abdominal pain      Fetal Assessment  Fetal Movement: active  Fetal HR Assessment Method: external  Fetal HR (beats/min): 145  Fetal HR Baseline: normal range  Fetal HR Variability: moderate (amplitude range 6 to 25 bpm)  Fetal HR Accelerations: lasting at least 15 seconds, greater than/equal to 15 bpm, episodic  Fetal HR Decelerations: absent  Sinusoidal Pattern Present: absent  Fetal HR Tracing Category: Category I    Temp 99.2 °F (37.3 °C) (Oral)   Ht 160 cm (63\")   Wt 101 kg (222 lb)   LMP  (LMP Unknown)   BMI 39.33 kg/m²     Reason for test: OB Triage, Other (Comment) (N/V, decreased fetal movement, and lower abdominal pain)  Date of Test: 2022  Time frame of test: 1809-2594  RN NST Interpretation: ALANNA Luna  2022  21:03 EDT  "

## 2022-06-26 NOTE — NURSING NOTE
at 37w6d who came in with complaints of nausea, vomiting, decreased fetal movement and lowe abdominal pain. Abdomen palpates soft& nontender. Patient reports improvement of symptoms and is discharged undelivered via private vehicle in stable condition.

## 2022-06-29 ENCOUNTER — ROUTINE PRENATAL (OUTPATIENT)
Dept: OBSTETRICS AND GYNECOLOGY | Facility: CLINIC | Age: 22
End: 2022-06-29

## 2022-06-29 VITALS — SYSTOLIC BLOOD PRESSURE: 127 MMHG | BODY MASS INDEX: 39.86 KG/M2 | WEIGHT: 225 LBS | DIASTOLIC BLOOD PRESSURE: 84 MMHG

## 2022-06-29 DIAGNOSIS — Z34.80 SUPERVISION OF OTHER NORMAL PREGNANCY, ANTEPARTUM: Primary | ICD-10-CM

## 2022-06-29 DIAGNOSIS — R31.29 OTHER MICROSCOPIC HEMATURIA: ICD-10-CM

## 2022-06-29 LAB
GLUCOSE UR STRIP-MCNC: NEGATIVE MG/DL
PROT UR STRIP-MCNC: ABNORMAL MG/DL

## 2022-06-29 PROCEDURE — 99212 OFFICE O/P EST SF 10 MIN: CPT | Performed by: STUDENT IN AN ORGANIZED HEALTH CARE EDUCATION/TRAINING PROGRAM

## 2022-06-29 NOTE — PROGRESS NOTES
OB FOLLOW UP  Complaint   Chief Complaint   Patient presents with   • Routine Prenatal Visit            Iftikhar Russell is a 21 y.o.  38w3d patient being seen today for her obstetrical follow up visit. Patient denies decreased fetal movement, regular contractions, loss of fluid or vaginal bleeding. Patient defers being checked today.       Her prenatal care is complicated by (and status) :    Patient Active Problem List   Diagnosis   • Asthma   • Supervision of other normal pregnancy, antepartum   • Obesity affecting pregnancy, antepartum   • Anxiety disorder affecting pregnancy, antepartum   • Asthma affecting pregnancy, antepartum   • Other microscopic hematuria   • Decreased fetal movements in third trimester   • Nausea and vomiting during pregnancy       All other systems reviewed and are negative.     The additional following portions of the patient's history were reviewed and updated as appropriate: allergies, current medications, past family history, past medical history, past social history, past surgical history and problem list.      EXAM:     Vital signs: Wt 102 kg (225 lb)   LMP  (LMP Unknown)   BMI 39.86 kg/m²   Appearance/psychiatric: To be in no distress  Constitutional: The patient is well nourished.  Cardiovascular: She does not have edema.  Respiratory: Respiratory effort is normal.  Gastrointestinal: Abdomen is soft, gravid, nontender, no rashes, heart tones are present, fundal height is size equals dates    Pelvic Exam: No, patient deferred     Urine glucose/protein: See prenatal flowsheet       Assessment and Plan    Problem List Items Addressed This Visit        Genitourinary and Reproductive     Other microscopic hematuria    Overview     2022 r sided flank pain, acute onset, urine dip moderate blood, straight cath 0-2 RBC; no WBC. Renal U/S unremarkable ; suspect small stone vs. Bladder irritation in pregnancy; strain urine. Tylenol as needed.               Gravid and      Supervision of other normal pregnancy, antepartum - Primary    Overview     EDC: 7/10/2022    Nips: Negative    Asthma  Obesity  Anxiety    COVID-19 vaccine: Complete  Flu vaccine: Recommended  Tdap vaccine:    2022 EFWE 1948 (53.9) JANETTE wnl. Vertex           Relevant Orders    POC Urinalysis Dipstick (Completed)          Impression  1. Pregnancy at 38w3d  2. Fetal status reassuring.   3. Activity and Exercise discussed.    Plan  1. IOL set for next Thursday  2. Follow up in 1 week      Patient was counseled to the following pregnancy precautions:  • Decreased fetal movement, if concern for decreased fetal movement please perform fetal kick counts you are looking for 10 movements in 2 hours.  If concern for fetal movement and not meeting that criteria, please present to triage for evaluation.  • Contractions occurring every 5 minutes for over an hour, lasting 30 to 60 seconds and progressively causing more discomfort, please seek medical attention to rule out labor  • If you believe that your water is broken, place a sanitary pad.  If pad fills in short period of time i.e. less than 5 minutes, take off pad placed another pad.  If this is saturated please present for rule out rupture of membranes  • Vaginal bleeding can be normal in pregnancy, this usually takes a form of spotting.  If having heavier bleeding like a menstrual period please present for evaluation; especially in light of severe abdominal pain this could represent a placental abruption.  • Keep all scheduled appointments as recommended.        Clement Khan MD  2022

## 2022-07-03 ENCOUNTER — HOSPITAL ENCOUNTER (OUTPATIENT)
Facility: HOSPITAL | Age: 22
Discharge: HOME | End: 2022-07-03
Attending: OBSTETRICS & GYNECOLOGY | Admitting: OBSTETRICS & GYNECOLOGY

## 2022-07-03 VITALS
HEIGHT: 63 IN | BODY MASS INDEX: 39.51 KG/M2 | WEIGHT: 223 LBS | TEMPERATURE: 98.5 F | OXYGEN SATURATION: 96 % | RESPIRATION RATE: 18 BRPM | SYSTOLIC BLOOD PRESSURE: 119 MMHG | DIASTOLIC BLOOD PRESSURE: 60 MMHG | HEART RATE: 77 BPM

## 2022-07-03 LAB
A1 MICROGLOB PLACENTAL VAG QL: NEGATIVE
LEUKOCYTE EST, POC: ABNORMAL

## 2022-07-03 PROCEDURE — 59025 FETAL NON-STRESS TEST: CPT

## 2022-07-03 PROCEDURE — G0463 HOSPITAL OUTPT CLINIC VISIT: HCPCS

## 2022-07-03 PROCEDURE — 84112 EVAL AMNIOTIC FLUID PROTEIN: CPT | Performed by: OBSTETRICS & GYNECOLOGY

## 2022-07-03 PROCEDURE — 81002 URINALYSIS NONAUTO W/O SCOPE: CPT | Performed by: OBSTETRICS & GYNECOLOGY

## 2022-07-03 PROCEDURE — 59025 FETAL NON-STRESS TEST: CPT | Performed by: OBSTETRICS & GYNECOLOGY

## 2022-07-04 NOTE — NURSING NOTE
at 39w0d presents to L&D with complaints of decreased fetal movement. Placed on US and [place]. Abdomen palpates soft & nontender. Patient denies vaginal bleeding. States her water may have broke. SVE 2/60-70%/-2. Contractions palpate mild, patient denies feeling contractions

## 2022-07-04 NOTE — NURSING NOTE
Discharge instructions given to patient and significant other. No questions at this time. Patient discharge undelivered in stable condition. Patient left via private vehicle

## 2022-07-04 NOTE — NON STRESS TEST
"Obstetrical Non-stress Test Interpretation     Name:  Iftikhar Russell  MRN: [id number]    21 y.o. female  at 39w0d    Indication: decreased fetal movement      Fetal Assessment  Fetal Movement: active  Fetal HR Assessment Method: external  Fetal HR (beats/min): 135  Fetal HR Baseline: normal range  Fetal HR Variability: moderate (amplitude range 6 to 25 bpm)  Fetal HR Accelerations: lasting at least 15 seconds, greater than/equal to 15 bpm, episodic  Fetal HR Decelerations: absent  Sinusoidal Pattern Present: absent    /60   Pulse 77   Temp 98.5 °F (36.9 °C) (Oral)   Resp 18   Ht 160 cm (63\")   Wt 101 kg (223 lb)   LMP  (LMP Unknown)   SpO2 96%   BMI 39.50 kg/m²     Reason for test: OB Triage, Other (Comment) (decreased fetal movement)  Date of Test: 7/3/2022  Time frame of test:   RN NST Interpretation: ALANNA Luna  7/3/2022  23:20 EDT  "

## 2022-07-05 ENCOUNTER — LAB (OUTPATIENT)
Dept: LAB | Facility: HOSPITAL | Age: 22
End: 2022-07-05

## 2022-07-05 DIAGNOSIS — O99.213 PREGNANCY COMPLICATED BY OBESITY, THIRD TRIMESTER: ICD-10-CM

## 2022-07-05 LAB — SARS-COV-2 RNA PNL SPEC NAA+PROBE: NOT DETECTED

## 2022-07-05 PROCEDURE — U0004 COV-19 TEST NON-CDC HGH THRU: HCPCS

## 2022-07-06 ENCOUNTER — ROUTINE PRENATAL (OUTPATIENT)
Dept: OBSTETRICS AND GYNECOLOGY | Facility: CLINIC | Age: 22
End: 2022-07-06

## 2022-07-06 ENCOUNTER — PREP FOR SURGERY (OUTPATIENT)
Dept: OTHER | Facility: HOSPITAL | Age: 22
End: 2022-07-06

## 2022-07-06 VITALS — SYSTOLIC BLOOD PRESSURE: 131 MMHG | WEIGHT: 225 LBS | BODY MASS INDEX: 39.86 KG/M2 | DIASTOLIC BLOOD PRESSURE: 83 MMHG

## 2022-07-06 DIAGNOSIS — O99.210 OBESITY AFFECTING PREGNANCY, ANTEPARTUM: Primary | ICD-10-CM

## 2022-07-06 DIAGNOSIS — Z34.80 SUPERVISION OF OTHER NORMAL PREGNANCY, ANTEPARTUM: Primary | ICD-10-CM

## 2022-07-06 PROBLEM — O21.9 NAUSEA AND VOMITING DURING PREGNANCY: Status: RESOLVED | Noted: 2022-06-25 | Resolved: 2022-07-06

## 2022-07-06 LAB
GLUCOSE UR STRIP-MCNC: NEGATIVE MG/DL
PROT UR STRIP-MCNC: NEGATIVE MG/DL

## 2022-07-06 PROCEDURE — 99212 OFFICE O/P EST SF 10 MIN: CPT | Performed by: STUDENT IN AN ORGANIZED HEALTH CARE EDUCATION/TRAINING PROGRAM

## 2022-07-06 RX ORDER — LIDOCAINE HYDROCHLORIDE 10 MG/ML
5 INJECTION, SOLUTION EPIDURAL; INFILTRATION; INTRACAUDAL; PERINEURAL AS NEEDED
Status: CANCELLED | OUTPATIENT
Start: 2022-07-06

## 2022-07-06 RX ORDER — TRISODIUM CITRATE DIHYDRATE AND CITRIC ACID MONOHYDRATE 500; 334 MG/5ML; MG/5ML
30 SOLUTION ORAL ONCE AS NEEDED
Status: CANCELLED | OUTPATIENT
Start: 2022-07-06

## 2022-07-06 RX ORDER — SODIUM CHLORIDE 0.9 % (FLUSH) 0.9 %
10 SYRINGE (ML) INJECTION AS NEEDED
Status: CANCELLED | OUTPATIENT
Start: 2022-07-06

## 2022-07-06 RX ORDER — ONDANSETRON 4 MG/1
4 TABLET, FILM COATED ORAL EVERY 6 HOURS PRN
Status: CANCELLED | OUTPATIENT
Start: 2022-07-06

## 2022-07-06 RX ORDER — ONDANSETRON 2 MG/ML
4 INJECTION INTRAMUSCULAR; INTRAVENOUS EVERY 6 HOURS PRN
Status: CANCELLED | OUTPATIENT
Start: 2022-07-06

## 2022-07-06 RX ORDER — OXYTOCIN/0.9 % SODIUM CHLORIDE 30/500 ML
125 PLASTIC BAG, INJECTION (ML) INTRAVENOUS ONCE
Status: CANCELLED | OUTPATIENT
Start: 2022-07-06 | End: 2022-07-06

## 2022-07-06 RX ORDER — MORPHINE SULFATE 2 MG/ML
2 INJECTION, SOLUTION INTRAMUSCULAR; INTRAVENOUS
Status: CANCELLED | OUTPATIENT
Start: 2022-07-06 | End: 2022-07-13

## 2022-07-06 RX ORDER — MISOPROSTOL 200 UG/1
800 TABLET ORAL AS NEEDED
Status: CANCELLED | OUTPATIENT
Start: 2022-07-06

## 2022-07-06 RX ORDER — ACETAMINOPHEN 325 MG/1
650 TABLET ORAL EVERY 4 HOURS PRN
Status: CANCELLED | OUTPATIENT
Start: 2022-07-06

## 2022-07-06 RX ORDER — CARBOPROST TROMETHAMINE 250 UG/ML
250 INJECTION, SOLUTION INTRAMUSCULAR AS NEEDED
Status: CANCELLED | OUTPATIENT
Start: 2022-07-06

## 2022-07-06 RX ORDER — METHYLERGONOVINE MALEATE 0.2 MG/ML
200 INJECTION INTRAVENOUS ONCE AS NEEDED
Status: CANCELLED | OUTPATIENT
Start: 2022-07-06

## 2022-07-06 RX ORDER — TERBUTALINE SULFATE 1 MG/ML
0.25 INJECTION, SOLUTION SUBCUTANEOUS AS NEEDED
Status: CANCELLED | OUTPATIENT
Start: 2022-07-06

## 2022-07-06 RX ORDER — HYDROCODONE BITARTRATE AND ACETAMINOPHEN 5; 325 MG/1; MG/1
1 TABLET ORAL EVERY 4 HOURS PRN
Status: CANCELLED | OUTPATIENT
Start: 2022-07-06 | End: 2022-07-13

## 2022-07-06 RX ORDER — OXYTOCIN/0.9 % SODIUM CHLORIDE 30/500 ML
1 PLASTIC BAG, INJECTION (ML) INTRAVENOUS
Status: CANCELLED | OUTPATIENT
Start: 2022-07-06

## 2022-07-06 RX ORDER — SODIUM CHLORIDE, SODIUM LACTATE, POTASSIUM CHLORIDE, CALCIUM CHLORIDE 600; 310; 30; 20 MG/100ML; MG/100ML; MG/100ML; MG/100ML
125 INJECTION, SOLUTION INTRAVENOUS CONTINUOUS
Status: CANCELLED | OUTPATIENT
Start: 2022-07-06

## 2022-07-06 RX ORDER — SODIUM CHLORIDE 0.9 % (FLUSH) 0.9 %
3 SYRINGE (ML) INJECTION EVERY 12 HOURS SCHEDULED
Status: CANCELLED | OUTPATIENT
Start: 2022-07-06

## 2022-07-06 RX ORDER — IBUPROFEN 600 MG/1
600 TABLET ORAL EVERY 6 HOURS PRN
Status: CANCELLED | OUTPATIENT
Start: 2022-07-06

## 2022-07-06 NOTE — PROGRESS NOTES
OB FOLLOW UP  Complaint   Chief Complaint   Patient presents with   • Routine Prenatal Visit            Iftikhar Russell is a 21 y.o.  39w3d patient being seen today for her obstetrical follow up visit. Patient denies decreased fetal movement, contractions, loss of fluid or vaginal bleeding.  No acute complaints.  Looking forward to induction of labor tomorrow.    Her prenatal care is complicated by (and status) :    Patient Active Problem List   Diagnosis   • Supervision of other normal pregnancy, antepartum   • Obesity affecting pregnancy, antepartum   • Anxiety disorder affecting pregnancy, antepartum   • Asthma affecting pregnancy, antepartum       All other systems reviewed and are negative.     The additional following portions of the patient's history were reviewed and updated as appropriate: allergies, current medications, past family history, past medical history, past social history, past surgical history and problem list.      EXAM:     Vital signs: /83   Wt 102 kg (225 lb)   LMP  (LMP Unknown)   BMI 39.86 kg/m²   Appearance/psychiatric: To be in no distress  Constitutional: The patient is well nourished.  Cardiovascular: She does not have edema.  Respiratory: Respiratory effort is normal.  Gastrointestinal: Abdomen is soft, gravid, nontender, no rashes, heart tones are present, fundal height is size equals dates    Pelvic Exam: No patient deferred    Urine glucose/protein: See prenatal flowsheet       Assessment and Plan    Problem List Items Addressed This Visit        Gravid and     Supervision of other normal pregnancy, antepartum - Primary    Overview     EDC: 7/10/2022    Nips: Negative    Asthma  Obesity  Anxiety    COVID-19 vaccine: Complete  Flu vaccine: Recommended  Tdap vaccine:    2022 EFWE 1948 (53.9) JANETTE wnl. Vertex           Relevant Orders    POC Urinalysis Dipstick (Completed)          Impression  1. Pregnancy at 39w3d  2. Fetal status reassuring.    3. Activity and Exercise discussed.    Plan  1.  Follow-up for induction of labor tomorrow  2.  Follow-up in 5 weeks with Dr. Aragon      Patient was counseled to the following pregnancy precautions:  • Decreased fetal movement, if concern for decreased fetal movement please perform fetal kick counts you are looking for 10 movements in 2 hours.  If concern for fetal movement and not meeting that criteria, please present to triage for evaluation.  • Contractions occurring every 5 minutes for over an hour, lasting 30 to 60 seconds and progressively causing more discomfort, please seek medical attention to rule out labor  • If you believe that your water is broken, place a sanitary pad.  If pad fills in short period of time i.e. less than 5 minutes, take off pad placed another pad.  If this is saturated please present for rule out rupture of membranes  • Vaginal bleeding can be normal in pregnancy, this usually takes a form of spotting.  If having heavier bleeding like a menstrual period please present for evaluation; especially in light of severe abdominal pain this could represent a placental abruption.  • Keep all scheduled appointments as recommended.        Clement Khan MD  07/06/2022

## 2022-07-07 ENCOUNTER — ANESTHESIA (OUTPATIENT)
Dept: LABOR AND DELIVERY | Facility: HOSPITAL | Age: 22
End: 2022-07-07

## 2022-07-07 ENCOUNTER — ANESTHESIA EVENT (OUTPATIENT)
Dept: LABOR AND DELIVERY | Facility: HOSPITAL | Age: 22
End: 2022-07-07

## 2022-07-07 ENCOUNTER — HOSPITAL ENCOUNTER (OUTPATIENT)
Dept: LABOR AND DELIVERY | Facility: HOSPITAL | Age: 22
Discharge: HOME | End: 2022-07-07

## 2022-07-07 ENCOUNTER — HOSPITAL ENCOUNTER (INPATIENT)
Facility: HOSPITAL | Age: 22
LOS: 2 days | Discharge: HOME | End: 2022-07-09
Attending: OBSTETRICS & GYNECOLOGY | Admitting: OBSTETRICS & GYNECOLOGY

## 2022-07-07 DIAGNOSIS — O99.210 OBESITY AFFECTING PREGNANCY, ANTEPARTUM: ICD-10-CM

## 2022-07-07 PROBLEM — Z34.90 ENCOUNTER FOR INDUCTION OF LABOR: Status: ACTIVE | Noted: 2022-07-07

## 2022-07-07 PROBLEM — Z34.80 SUPERVISION OF OTHER NORMAL PREGNANCY, ANTEPARTUM: Status: RESOLVED | Noted: 2021-12-20 | Resolved: 2022-07-07

## 2022-07-07 LAB
ABO GROUP BLD: NORMAL
BASE EXCESS BLDCOA CALC-SCNC: -2.3 MMOL/L (ref -2–2)
BASE EXCESS BLDCOV CALC-SCNC: -3.3 MMOL/L (ref -2–2)
BASOPHILS # BLD AUTO: 0.03 10*3/MM3 (ref 0–0.2)
BASOPHILS NFR BLD AUTO: 0.4 % (ref 0–1.5)
BLD GP AB SCN SERPL QL: NEGATIVE
DEPRECATED RDW RBC AUTO: 48.7 FL (ref 37–54)
EOSINOPHIL # BLD AUTO: 0.39 10*3/MM3 (ref 0–0.4)
EOSINOPHIL NFR BLD AUTO: 5.6 % (ref 0.3–6.2)
ERYTHROCYTE [DISTWIDTH] IN BLOOD BY AUTOMATED COUNT: 16 % (ref 12.3–15.4)
HCO3 BLDCOA-SCNC: 24.6 MMOL/L
HCO3 BLDCOV-SCNC: 22.7 MMOL/L
HCT VFR BLD AUTO: 36.6 % (ref 34–46.6)
HGB BLD-MCNC: 11.9 G/DL (ref 12–15.9)
IMM GRANULOCYTES # BLD AUTO: 0.03 10*3/MM3 (ref 0–0.05)
IMM GRANULOCYTES NFR BLD AUTO: 0.4 % (ref 0–0.5)
LYMPHOCYTES # BLD AUTO: 1.14 10*3/MM3 (ref 0.7–3.1)
LYMPHOCYTES NFR BLD AUTO: 16.4 % (ref 19.6–45.3)
MCH RBC QN AUTO: 27.8 PG (ref 26.6–33)
MCHC RBC AUTO-ENTMCNC: 32.5 G/DL (ref 31.5–35.7)
MCV RBC AUTO: 85.5 FL (ref 79–97)
MONOCYTES # BLD AUTO: 0.45 10*3/MM3 (ref 0.1–0.9)
MONOCYTES NFR BLD AUTO: 6.5 % (ref 5–12)
NEUTROPHILS NFR BLD AUTO: 4.93 10*3/MM3 (ref 1.7–7)
NEUTROPHILS NFR BLD AUTO: 70.7 % (ref 42.7–76)
NRBC BLD AUTO-RTO: 0 /100 WBC (ref 0–0.2)
PCO2 BLDCOA: 49.9 MMHG (ref 33–49)
PCO2 BLDCOV: 44 MM HG (ref 28–40)
PH BLDCOA: 7.31 PH UNITS (ref 7.21–7.31)
PH BLDCOV: 7.33 PH UNITS (ref 7.31–7.37)
PLATELET # BLD AUTO: 193 10*3/MM3 (ref 140–450)
PMV BLD AUTO: 12.2 FL (ref 6–12)
PO2 BLDCOA: <40.5 MMHG
PO2 BLDCOV: <40.5 MM HG (ref 21–31)
RBC # BLD AUTO: 4.28 10*6/MM3 (ref 3.77–5.28)
RH BLD: POSITIVE
T&S EXPIRATION DATE: NORMAL
WBC NRBC COR # BLD: 6.97 10*3/MM3 (ref 3.4–10.8)

## 2022-07-07 PROCEDURE — 86901 BLOOD TYPING SEROLOGIC RH(D): CPT | Performed by: OBSTETRICS & GYNECOLOGY

## 2022-07-07 PROCEDURE — 25010000002 FENTANYL CITRATE (PF) 1000 MCG/20ML SOLUTION: Performed by: ANESTHESIOLOGY

## 2022-07-07 PROCEDURE — S0260 H&P FOR SURGERY: HCPCS | Performed by: OBSTETRICS & GYNECOLOGY

## 2022-07-07 PROCEDURE — 82803 BLOOD GASES ANY COMBINATION: CPT | Performed by: OBSTETRICS & GYNECOLOGY

## 2022-07-07 PROCEDURE — 51702 INSERT TEMP BLADDER CATH: CPT

## 2022-07-07 PROCEDURE — C1755 CATHETER, INTRASPINAL: HCPCS | Performed by: ANESTHESIOLOGY

## 2022-07-07 PROCEDURE — 10907ZC DRAINAGE OF AMNIOTIC FLUID, THERAPEUTIC FROM PRODUCTS OF CONCEPTION, VIA NATURAL OR ARTIFICIAL OPENING: ICD-10-PCS | Performed by: OBSTETRICS & GYNECOLOGY

## 2022-07-07 PROCEDURE — 85025 COMPLETE CBC W/AUTO DIFF WBC: CPT | Performed by: OBSTETRICS & GYNECOLOGY

## 2022-07-07 PROCEDURE — 59410 OBSTETRICAL CARE: CPT | Performed by: OBSTETRICS & GYNECOLOGY

## 2022-07-07 PROCEDURE — 86850 RBC ANTIBODY SCREEN: CPT | Performed by: OBSTETRICS & GYNECOLOGY

## 2022-07-07 PROCEDURE — 10H07YZ INSERTION OF OTHER DEVICE INTO PRODUCTS OF CONCEPTION, VIA NATURAL OR ARTIFICIAL OPENING: ICD-10-PCS | Performed by: OBSTETRICS & GYNECOLOGY

## 2022-07-07 PROCEDURE — 86900 BLOOD TYPING SEROLOGIC ABO: CPT | Performed by: OBSTETRICS & GYNECOLOGY

## 2022-07-07 PROCEDURE — 25010000002 ROPIVACAINE PER 1 MG: Performed by: ANESTHESIOLOGY

## 2022-07-07 PROCEDURE — 25010000002 FENTANYL CITRATE (PF) 50 MCG/ML SOLUTION: Performed by: ANESTHESIOLOGY

## 2022-07-07 PROCEDURE — 0KQM0ZZ REPAIR PERINEUM MUSCLE, OPEN APPROACH: ICD-10-PCS | Performed by: OBSTETRICS & GYNECOLOGY

## 2022-07-07 RX ORDER — HYDROCODONE BITARTRATE AND ACETAMINOPHEN 5; 325 MG/1; MG/1
1 TABLET ORAL EVERY 4 HOURS PRN
Status: DISCONTINUED | OUTPATIENT
Start: 2022-07-07 | End: 2022-07-09 | Stop reason: HOSPADM

## 2022-07-07 RX ORDER — OXYTOCIN/0.9 % SODIUM CHLORIDE 30/500 ML
125 PLASTIC BAG, INJECTION (ML) INTRAVENOUS ONCE
Status: DISCONTINUED | OUTPATIENT
Start: 2022-07-07 | End: 2022-07-09 | Stop reason: HOSPADM

## 2022-07-07 RX ORDER — PRENATAL VIT/IRON FUM/FOLIC AC 27MG-0.8MG
1 TABLET ORAL DAILY
Status: DISCONTINUED | OUTPATIENT
Start: 2022-07-08 | End: 2022-07-09 | Stop reason: HOSPADM

## 2022-07-07 RX ORDER — OXYTOCIN/0.9 % SODIUM CHLORIDE 30/500 ML
125 PLASTIC BAG, INJECTION (ML) INTRAVENOUS ONCE
Status: DISCONTINUED | OUTPATIENT
Start: 2022-07-07 | End: 2022-07-07 | Stop reason: HOSPADM

## 2022-07-07 RX ORDER — LIDOCAINE HYDROCHLORIDE AND EPINEPHRINE 15; 5 MG/ML; UG/ML
INJECTION, SOLUTION EPIDURAL
Status: COMPLETED | OUTPATIENT
Start: 2022-07-07 | End: 2022-07-07

## 2022-07-07 RX ORDER — ONDANSETRON 2 MG/ML
4 INJECTION INTRAMUSCULAR; INTRAVENOUS EVERY 6 HOURS PRN
Status: DISCONTINUED | OUTPATIENT
Start: 2022-07-07 | End: 2022-07-07 | Stop reason: HOSPADM

## 2022-07-07 RX ORDER — IBUPROFEN 600 MG/1
600 TABLET ORAL EVERY 6 HOURS PRN
Status: DISCONTINUED | OUTPATIENT
Start: 2022-07-07 | End: 2022-07-09 | Stop reason: HOSPADM

## 2022-07-07 RX ORDER — FENTANYL CITRATE 50 UG/ML
INJECTION, SOLUTION INTRAMUSCULAR; INTRAVENOUS
Status: COMPLETED
Start: 2022-07-07 | End: 2022-07-07

## 2022-07-07 RX ORDER — ONDANSETRON 4 MG/1
4 TABLET, FILM COATED ORAL EVERY 6 HOURS PRN
Status: DISCONTINUED | OUTPATIENT
Start: 2022-07-07 | End: 2022-07-07 | Stop reason: HOSPADM

## 2022-07-07 RX ORDER — ONDANSETRON 4 MG/1
4 TABLET, FILM COATED ORAL EVERY 6 HOURS PRN
Status: DISCONTINUED | OUTPATIENT
Start: 2022-07-07 | End: 2022-07-09 | Stop reason: HOSPADM

## 2022-07-07 RX ORDER — METHYLERGONOVINE MALEATE 0.2 MG/ML
200 INJECTION INTRAVENOUS ONCE AS NEEDED
Status: DISCONTINUED | OUTPATIENT
Start: 2022-07-07 | End: 2022-07-07 | Stop reason: HOSPADM

## 2022-07-07 RX ORDER — SODIUM CHLORIDE 0.9 % (FLUSH) 0.9 %
1-10 SYRINGE (ML) INJECTION AS NEEDED
Status: DISCONTINUED | OUTPATIENT
Start: 2022-07-07 | End: 2022-07-09 | Stop reason: HOSPADM

## 2022-07-07 RX ORDER — CARBOPROST TROMETHAMINE 250 UG/ML
250 INJECTION, SOLUTION INTRAMUSCULAR AS NEEDED
Status: DISCONTINUED | OUTPATIENT
Start: 2022-07-07 | End: 2022-07-07 | Stop reason: HOSPADM

## 2022-07-07 RX ORDER — IBUPROFEN 600 MG/1
600 TABLET ORAL EVERY 6 HOURS PRN
Status: DISCONTINUED | OUTPATIENT
Start: 2022-07-07 | End: 2022-07-07 | Stop reason: HOSPADM

## 2022-07-07 RX ORDER — SODIUM CHLORIDE 0.9 % (FLUSH) 0.9 %
10 SYRINGE (ML) INJECTION AS NEEDED
Status: DISCONTINUED | OUTPATIENT
Start: 2022-07-07 | End: 2022-07-07 | Stop reason: HOSPADM

## 2022-07-07 RX ORDER — ALBUTEROL SULFATE 90 UG/1
2 AEROSOL, METERED RESPIRATORY (INHALATION) EVERY 4 HOURS PRN
Status: DISCONTINUED | OUTPATIENT
Start: 2022-07-07 | End: 2022-07-09 | Stop reason: HOSPADM

## 2022-07-07 RX ORDER — TERBUTALINE SULFATE 1 MG/ML
0.25 INJECTION, SOLUTION SUBCUTANEOUS AS NEEDED
Status: DISCONTINUED | OUTPATIENT
Start: 2022-07-07 | End: 2022-07-07 | Stop reason: HOSPADM

## 2022-07-07 RX ORDER — BUPIVACAINE HYDROCHLORIDE AND EPINEPHRINE 5; 5 MG/ML; UG/ML
INJECTION, SOLUTION EPIDURAL; INTRACAUDAL; PERINEURAL AS NEEDED
Status: DISCONTINUED | OUTPATIENT
Start: 2022-07-07 | End: 2022-07-07 | Stop reason: SURG

## 2022-07-07 RX ORDER — TRISODIUM CITRATE DIHYDRATE AND CITRIC ACID MONOHYDRATE 500; 334 MG/5ML; MG/5ML
30 SOLUTION ORAL ONCE AS NEEDED
Status: DISCONTINUED | OUTPATIENT
Start: 2022-07-07 | End: 2022-07-07 | Stop reason: HOSPADM

## 2022-07-07 RX ORDER — HYDROCODONE BITARTRATE AND ACETAMINOPHEN 5; 325 MG/1; MG/1
2 TABLET ORAL EVERY 4 HOURS PRN
Status: DISCONTINUED | OUTPATIENT
Start: 2022-07-07 | End: 2022-07-09 | Stop reason: HOSPADM

## 2022-07-07 RX ORDER — BISACODYL 10 MG
10 SUPPOSITORY, RECTAL RECTAL DAILY PRN
Status: DISCONTINUED | OUTPATIENT
Start: 2022-07-08 | End: 2022-07-09 | Stop reason: HOSPADM

## 2022-07-07 RX ORDER — ACETAMINOPHEN 325 MG/1
650 TABLET ORAL EVERY 4 HOURS PRN
Status: DISCONTINUED | OUTPATIENT
Start: 2022-07-07 | End: 2022-07-07 | Stop reason: HOSPADM

## 2022-07-07 RX ORDER — LIDOCAINE HYDROCHLORIDE 10 MG/ML
5 INJECTION, SOLUTION EPIDURAL; INFILTRATION; INTRACAUDAL; PERINEURAL AS NEEDED
Status: DISCONTINUED | OUTPATIENT
Start: 2022-07-07 | End: 2022-07-07 | Stop reason: HOSPADM

## 2022-07-07 RX ORDER — SODIUM CHLORIDE 0.9 % (FLUSH) 0.9 %
3 SYRINGE (ML) INJECTION EVERY 12 HOURS SCHEDULED
Status: DISCONTINUED | OUTPATIENT
Start: 2022-07-07 | End: 2022-07-07 | Stop reason: HOSPADM

## 2022-07-07 RX ORDER — FENTANYL CITRATE 50 UG/ML
INJECTION, SOLUTION INTRAMUSCULAR; INTRAVENOUS
Status: COMPLETED | OUTPATIENT
Start: 2022-07-07 | End: 2022-07-07

## 2022-07-07 RX ORDER — DOCUSATE SODIUM 100 MG/1
100 CAPSULE, LIQUID FILLED ORAL 2 TIMES DAILY
Status: DISCONTINUED | OUTPATIENT
Start: 2022-07-07 | End: 2022-07-09 | Stop reason: HOSPADM

## 2022-07-07 RX ORDER — HYDROCODONE BITARTRATE AND ACETAMINOPHEN 5; 325 MG/1; MG/1
1 TABLET ORAL EVERY 4 HOURS PRN
Status: DISCONTINUED | OUTPATIENT
Start: 2022-07-07 | End: 2022-07-07 | Stop reason: HOSPADM

## 2022-07-07 RX ORDER — OXYTOCIN/0.9 % SODIUM CHLORIDE 30/500 ML
1 PLASTIC BAG, INJECTION (ML) INTRAVENOUS
Status: DISCONTINUED | OUTPATIENT
Start: 2022-07-07 | End: 2022-07-07 | Stop reason: HOSPADM

## 2022-07-07 RX ORDER — SODIUM CHLORIDE, SODIUM LACTATE, POTASSIUM CHLORIDE, CALCIUM CHLORIDE 600; 310; 30; 20 MG/100ML; MG/100ML; MG/100ML; MG/100ML
125 INJECTION, SOLUTION INTRAVENOUS CONTINUOUS
Status: DISCONTINUED | OUTPATIENT
Start: 2022-07-07 | End: 2022-07-07

## 2022-07-07 RX ORDER — MISOPROSTOL 200 UG/1
800 TABLET ORAL AS NEEDED
Status: DISCONTINUED | OUTPATIENT
Start: 2022-07-07 | End: 2022-07-07 | Stop reason: HOSPADM

## 2022-07-07 RX ORDER — MISOPROSTOL 200 UG/1
200 TABLET ORAL
Status: COMPLETED | OUTPATIENT
Start: 2022-07-07 | End: 2022-07-07

## 2022-07-07 RX ORDER — CALCIUM CARBONATE 200(500)MG
2 TABLET,CHEWABLE ORAL 3 TIMES DAILY PRN
Status: DISCONTINUED | OUTPATIENT
Start: 2022-07-07 | End: 2022-07-09 | Stop reason: HOSPADM

## 2022-07-07 RX ORDER — MORPHINE SULFATE 2 MG/ML
2 INJECTION, SOLUTION INTRAMUSCULAR; INTRAVENOUS
Status: DISCONTINUED | OUTPATIENT
Start: 2022-07-07 | End: 2022-07-07 | Stop reason: HOSPADM

## 2022-07-07 RX ORDER — EPHEDRINE SULFATE 50 MG/ML
5 INJECTION, SOLUTION INTRAVENOUS
Status: DISCONTINUED | OUTPATIENT
Start: 2022-07-07 | End: 2022-07-07 | Stop reason: HOSPADM

## 2022-07-07 RX ADMIN — MISOPROSTOL 200 MCG: 100 TABLET ORAL at 19:43

## 2022-07-07 RX ADMIN — SODIUM CHLORIDE, POTASSIUM CHLORIDE, SODIUM LACTATE AND CALCIUM CHLORIDE 125 ML/HR: 600; 310; 30; 20 INJECTION, SOLUTION INTRAVENOUS at 07:51

## 2022-07-07 RX ADMIN — SODIUM CHLORIDE, POTASSIUM CHLORIDE, SODIUM LACTATE AND CALCIUM CHLORIDE 125 ML/HR: 600; 310; 30; 20 INJECTION, SOLUTION INTRAVENOUS at 12:53

## 2022-07-07 RX ADMIN — IBUPROFEN 600 MG: 600 TABLET ORAL at 22:04

## 2022-07-07 RX ADMIN — WITCH HAZEL 1 PAD: 500 SOLUTION RECTAL; TOPICAL at 21:28

## 2022-07-07 RX ADMIN — BUPIVACAINE HYDROCHLORIDE AND EPINEPHRINE BITARTRATE 6 ML: 5; .005 INJECTION, SOLUTION EPIDURAL; INTRACAUDAL; PERINEURAL at 16:50

## 2022-07-07 RX ADMIN — MISOPROSTOL 200 MCG: 100 TABLET ORAL at 23:27

## 2022-07-07 RX ADMIN — FENTANYL CITRATE 100 MCG: 50 INJECTION, SOLUTION INTRAMUSCULAR; INTRAVENOUS at 12:38

## 2022-07-07 RX ADMIN — LIDOCAINE HYDROCHLORIDE AND EPINEPHRINE 2 ML: 15; 5 INJECTION, SOLUTION EPIDURAL at 12:43

## 2022-07-07 RX ADMIN — LIDOCAINE HYDROCHLORIDE AND EPINEPHRINE 3 ML: 15; 5 INJECTION, SOLUTION EPIDURAL at 12:38

## 2022-07-07 RX ADMIN — Medication: at 21:28

## 2022-07-07 RX ADMIN — OXYTOCIN 2 MILLI-UNITS/MIN: 10 INJECTION, SOLUTION INTRAMUSCULAR; INTRAVENOUS at 07:53

## 2022-07-07 RX ADMIN — Medication 3 ML: at 09:00

## 2022-07-07 RX ADMIN — SODIUM CHLORIDE, POTASSIUM CHLORIDE, SODIUM LACTATE AND CALCIUM CHLORIDE 125 ML/HR: 600; 310; 30; 20 INJECTION, SOLUTION INTRAVENOUS at 06:40

## 2022-07-07 NOTE — ANESTHESIA PREPROCEDURE EVALUATION
Anesthesia Evaluation     Patient summary reviewed and Nursing notes reviewed   no history of anesthetic complications:  NPO Solid Status: > 8 hours  NPO Liquid Status: > 2 hours           Airway   Mallampati: II  TM distance: >3 FB  Neck ROM: full  No difficulty expected  Dental      Pulmonary - normal exam    breath sounds clear to auscultation  (+) asthma,  Cardiovascular - negative cardio ROS and normal exam  Exercise tolerance: good (4-7 METS)    Rhythm: regular  Rate: normal        Neuro/Psych- negative ROS  GI/Hepatic/Renal/Endo    (+) obesity,       Musculoskeletal     (+) back pain,   Abdominal    Substance History      OB/GYN    (+) Pregnant,         Other                        Anesthesia Plan    ASA 2     epidural       Anesthetic plan, risks, benefits, and alternatives have been provided, discussed and informed consent has been obtained with: patient.        CODE STATUS:    Level Of Support Discussed With: Patient  Code Status (Patient has no pulse and is not breathing): CPR (Attempt to Resuscitate)  Medical Interventions (Patient has pulse or is breathing): Full

## 2022-07-07 NOTE — L&D DELIVERY NOTE
VAGINAL DELIVERY NOTE          Delivery Date: 7/7/2022   Delivery Time: 6:22 PM   Delivery Type: Spontaneous vaginal  Gestational Age: 39w4d  Delivery Provider: Jluis Aragon     Anesthesia: Epidural    Infant: male  infant   3320 g (7 lb 5.1 oz)   APGARS: 8  @ 1 minute / 9  @ 5 minutes  Shoulder Dystocia: No      Placenta, Cord, and Fluid    Placenta Delivered:  Spontaneous  at   7/7/2022  6:26 PM     Cord: 3 vessels  present.   Nuchal Cord:  no   Cord Blood Obtained: Yes    Cord Gases Obtained:  Yes    Cord Gas Results: Venous:  No results found for: PHCVEN    Arterial:  No results found for: PHCART       Perineum: OBPERINEUM: 2nd degree laceration    EBL: Est. Blood Loss (mL): 300 mL (Filed from Delivery Summary) (07/07/22 1822)    Complications: None    Description of Procedure: I was called to the bedside after the patient was found to be complete, complete, +1 station after having a prolonged deceleration for about 5 minutes.  Deceleration was resolving with position change.  She was placed in lithotomy position, prepped and draped in usual fashion began pushing.  With approximately 4 rounds of pushing she brought the fetus down to a +4 station.  With the next pushes the patient progressed to a +5 station, and delivered a live viable male infant at 6:22 PM over a small second-degree midline laceration. The baby delivered in the left occiput anterior presentation, with the anterior shoulder being delivered first. At no time was there a shoulder dystocia, and at no time was any fundal pressure given. After complete delivery, the mouth and nose were bulb suctioned, the cord was doubly clamped and the baby was placed on the mother's abdomen, with a good cry. The father the baby cut the umbilical cord. Apgars were 8 and 9 at 1 and 5 minutes. The birth weight was 7 pounds 5 ounces. Cord blood and gases were collected. The placenta was delivered spontaneously and intact at 6:26 PM. There was a 3 vessel  umbilical cord present. The vagina and cervix were inspected and there was a small second-degree midline laceration, repaired with 3-0 Vicryl suture in 2 layers, in a continuous fashion. Uterine tone was good, and lochia was scant. EBL was 300 mL. Both mother and  are recovering in excellent condition in the labor room. All counts were correct x 2 prior to my exit of the room    Electronically signed by Jluis Aragon MD, 22, 6:42 PM EDT.

## 2022-07-07 NOTE — PROGRESS NOTES
" [facility]  Obstetric Intrapartum Progress Note    Subjective:  Comfortable with epidural    Objective:  Temp:  [98 °F (36.7 °C)-99 °F (37.2 °C)] 98.2 °F (36.8 °C)  Heart Rate:  [] 71  Resp:  [16-18] 16  BP: (113-143)/(49-83) 113/63     Flowsheet Rows    Flowsheet Row First Filed Value   Admission Height 160 cm (63\") Documented at 07/07/2022 0604   Admission Weight 102 kg (225 lb) Documented at 07/07/2022 0604          Intake/Output last 24 hours:    Intake/Output Summary (Last 24 hours) at 7/7/2022 1457  Last data filed at 7/7/2022 1253  Gross per 24 hour   Intake 2000 ml   Output --   Net 2000 ml       Intake/Output this shift:  I/O this shift:  In: 2000 [I.V.:2000]  Out: -     FHR Tracing:  Baseline:  130  Variability:   Moderate  Accelerations: Present (32 weeks+) 15 x 15 bpm  Decelerations: Absent  Contractions:  Not tracing    Physical Exam:  General appearance: alert and in no distress  Cervix: Dilation: 6cm              Effacement: 80%              Station: -1              Presentation: cephalic    IUPC placed without difficulty    Assessment:    Encounter for induction of labor    Obesity affecting pregnancy, antepartum    Anxiety disorder affecting pregnancy, antepartum    Asthma affecting pregnancy, antepartum    Category I  Reassuring fetus, IUPC placed    Plan:  Continue pitocin  Continue to monitor  Active labor positioning  Titrate Pitocin to adequate contractions.  Reviewed course/progress/plan with pt, questions answered to her satisfaction, she desires to proceed as outlined.    Jluis Aragon MD 7/7/2022 14:57 EDT  "

## 2022-07-07 NOTE — H&P
[facility]  Obstetric History and Physical    Chief Complaint:  Scheduled IOL    Subjective:  The patient is a 21 y.o.  currently at 39w4d, who presents for a scheduled induction of labor secondary to maternal obesity.  The patient has no complaints.  She reports some irregular contractions.  She denies any vaginal bleeding, loss of fluid, or decreased fetal movement..    Her prenatal care is complicated by: Elevated BMI- pre-pregnancy, Asthma    The following portions of the patients history were reviewed and updated as appropriate: current medications, allergies, past medical history, past surgical history, past family history, past social history and problem list .     Prenatal Information:  Prenatal Results     POC Urine Glucose/Protein     Test Value Reference Range Date Time    Urine Glucose  Negative mg/dL Negative 22 1103    Urine Protein  Negative mg/dL Negative 22 1103          Initial Prenatal Labs     Test Value Reference Range Date Time    Hemoglobin  12.8 g/dL 12.0 - 15.9 21 1413    Hematocrit  40.0 % 34.0 - 46.6 21 1413    Platelets  248 10*3/mm3 140 - 450 21 1413    Rubella IgG  1.32 index Immune >0.99 21 1413       1.49 index Immune >0.99 10/08/21 1102    Hepatitis B SAg  Non-Reactive  Non-Reactive 21 1413    Hepatitis C Ab  Non-Reactive  Non-Reactive 21 1413    RPR  Non-Reactive  Non-Reactive 21 1413    ABO  O   21 1413    Rh  Positive   21 1413    Antibody Screen  Negative   21 1413    HIV  Non-Reactive  Non-Reactive 21 1413    Urine Culture  Final report   21 1459    Gonorrhea  Negative  Negative 21 1459      ^ Negative   21     Chlamydia  Negative  Negative 21 1459      ^ Negative   21     TSH  1.800 uIU/mL 0.270 - 4.200 21 1413    HgB A1c               2nd and 3rd Trimester     Test Value Reference Range Date Time    Hemoglobin (repeated)  11.5 g/dL 12.0 - 15.9 22        11.6 g/dL 12.0 - 15.9 06/18/22 1900       11.4 g/dL 12.0 - 15.9 05/20/22 1051       11.7 g/dL 12.0 - 15.9 05/12/22 1044       11.8 g/dL 12.0 - 15.9 03/23/22 1651    Hematocrit (repeated)  36.0 % 34.0 - 46.6 06/25/22 1942       36.1 % 34.0 - 46.6 06/18/22 1900       35.2 % 34.0 - 46.6 05/20/22 1051       36.5 % 34.0 - 46.6 05/12/22 1044       36.6 % 34.0 - 46.6 03/23/22 1651    Platelets   210 10*3/mm3 140 - 450 06/25/22 1942       210 10*3/mm3 140 - 450 06/18/22 1900       228 10*3/mm3 140 - 450 05/20/22 1051       200 10*3/mm3 140 - 450 05/12/22 1044       209 10*3/mm3 140 - 450 03/23/22 1651       248 10*3/mm3 140 - 450 12/20/21 1413    GCT  105 mg/dL 65 - 139 03/23/22 1651    Antibody Screen (repeated)        GTT Fasting        GTT 1 Hr        GTT 2 Hr        GTT 3 Hr        Group B Strep  No Group B Streptococcus isolated   06/15/22 1153          Drug Screening     Test Value Reference Range Date Time    Amphetamine Screen        Barbiturate Screen        Benzodiazepine Screen        Methadone Screen        Phencyclidine Screen        Opiates Screen        THC Screen        Cocaine Screen        Propoxyphene Screen        Buprenorphine Screen        Methamphetamine Screen        Oxycodone Screen        Tricyclic Antidepressants Screen              Other (Risk screening)     Test Value Reference Range Date Time    Varicella IgG  465 index Immune >165 10/08/21 1102    Parvovirus IgG        CMV IgG        Cystic Fibrosis        Hemoglobin electrophoresis        NIPT ^ Negative   01/18/22     MSAFP-4        AFP (for NTD only)              Legend    ^: Historical                      External Prenatal Results     Pregnancy Outside Results - Transcribed From Office Records - See Scanned Records For Details     Test Value Date Time    ABO  O  12/20/21 1413    Rh  Positive  12/20/21 1413    Antibody Screen  Negative  12/20/21 1413    Varicella IgG  465 index 10/08/21 1102    Rubella  1.32 index 12/20/21 1413        1.49 index 10/08/21 1102    Hgb  11.5 g/dL 22 1942       11.6 g/dL 22 1900       11.4 g/dL 22 1051       11.7 g/dL 22 1044       11.8 g/dL 22 1651       12.8 g/dL 21 1413    Hct  36.0 % 22 1942       36.1 % 22 1900       35.2 % 22 1051       36.5 % 22 1044       36.6 % 22 1651       40.0 % 21 1413    Glucose Fasting GTT       Glucose Tolerance Test 1 hour       Glucose Tolerance Test 3 hour       Gonorrhea (discrete)  Negative  21 1459      ^ Negative  21     Chlamydia (discrete)  Negative  21 1459      ^ Negative  21     RPR  Non-Reactive  21 1413    VDRL       Syphilis Antibody       HBsAg  Non-Reactive  21 1413    Herpes Simplex Virus PCR       Herpes Simplex VIrus Culture       HIV  Non-Reactive  21 1413    Hep C RNA Quant PCR       Hep C Antibody  Non-Reactive  21 1413    AFP       Group B Strep  No Group B Streptococcus isolated  06/15/22 1153    GBS Susceptibility to Clindamycin       GBS Susceptibility to Erythromycin       Fetal Fibronectin       Genetic Testing, Maternal Blood             Drug Screening     Test Value Date Time    Urine Drug Screen       Amphetamine Screen       Barbiturate Screen       Benzodiazepine Screen       Methadone Screen       Phencyclidine Screen       Opiates Screen       THC Screen       Cocaine Screen       Propoxyphene Screen       Buprenorphine Screen       Methamphetamine Screen       Oxycodone Screen       Tricyclic Antidepressants Screen             Legend    ^: Historical                        Past OB History:  OB History    Para Term  AB Living   2 1 1 0 0 1   SAB IAB Ectopic Molar Multiple Live Births   0 0 0 0 0 1      # Outcome Date GA Lbr Brandon/2nd Weight Sex Delivery Anes PTL Lv   2 Current            1 Term 21 37w3d  3289 g (7 lb 4 oz) M Vag-Spont EPI  HERLINDA       Past Medical History:  Past Medical History:   Diagnosis Date   •  "Asthma     SEASONAL   • Nausea and vomiting during pregnancy 6/25/2022   • Other microscopic hematuria 4/11/2022 4/11/2022 r sided flank pain, acute onset, urine dip moderate blood, straight cath 0-2 RBC; no WBC. Renal U/S unremarkable ; suspect small stone vs. Bladder irritation in pregnancy; strain urine. Tylenol as needed.        Past Surgical History:  Past Surgical History:   Procedure Laterality Date   • WISDOM TOOTH EXTRACTION         Family History:  Family History   Problem Relation Age of Onset   • Diabetes Mother        Social History:   reports that she has never smoked. She has never used smokeless tobacco.   reports previous alcohol use.   reports no history of drug use.    Medications:  Prenatal, acetaminophen, albuterol sulfate HFA, ascorbic acid, ferrous sulfate, and vitamin D3    Allergies:  Allergies   Allergen Reactions   • Lavender Oil Shortness Of Breath       Review of Systems:  No leaking fluid, No vaginal bleeding, Adequate FM, No HA, No scotomata or vision changes, No RUQ/epigastric pain, No fever/chills, No nausea, No vomiting, No diarrhea, No constipation, No abdominal pain, Contractions, Swelling and Back pain    Objective     Vital Signs:  Visit Vitals  /52 (BP Location: Right arm, Patient Position: Sitting)   Pulse 82   Temp 98.8 °F (37.1 °C) (Oral)   Resp 18   Ht 160 cm (63\")   Wt 102 kg (225 lb)   LMP  (LMP Unknown)   SpO2 100%   BMI 39.86 kg/m²       Physical Exam:    General:  alert, well appearing, in no apparent distress  HEENT: PERRLA, extra ocular movement intact, sclera clear, anicteric and neck supple with midline trachea  Cardiovascular: normal rate, regular rhythm,  no murmurs, rubs, or gallops  Lungs: clear to auscultation, no wheezes, rales or rhonchi, symmetric air entry  Abdomen: soft, nontender, nondistended, no abnormal masses, no epigastric pain, fundus soft, nontender 40 weeks size and estimated fetal weight: 7.5 lbs  Extremities: 1+ edema, no redness or " tenderness in the calves or thighs 2+ bilaterally  Pelvic exam: Presentation: cephalic  Dilation: 2cm  Effacement: 70%  Station: -2  The pelvis is felt to be clinically adequate    Fetal Assessment:    FHR:   Baseline: 130  Variability: Moderate  Accelerations: Present (32 weeks+) 15 x 15 bpm  Decelerations: There is a brief variable deceleration present lasting less than 10 seconds with a cristofer at 110 bpm.  Category: Category 2, but reassuring    Contractions: Irregular    Fetal Presentation: cephalic    Labs:   Lab Results (last 24 hours)     Procedure Component Value Units Date/Time    POC Urinalysis Dipstick [[id number]] Collected: 22    Specimen: Urine Updated: 22 1103     Glucose, UA Negative mg/dL      Protein, POC Negative mg/dL     CBC & Differential [[id number]] Updated: 22    Specimen: Blood     Narrative:      The following orders were created for panel order CBC & Differential.  Procedure                               Abnormality         Status                     ---------                               -----------         ------                     CBC Auto Differential[[id number]]                                                         Please view results for these tests on the individual orders.    CBC Auto Differential [[id number]] Updated: 22    Specimen: Blood            Assessment:  21 y.o.  currently at 39w4d    Encounter for induction of labor    Obesity affecting pregnancy, antepartum    Anxiety disorder affecting pregnancy, antepartum    Asthma affecting pregnancy, antepartum    GBS: Negative    Plan:  IOL  The risks, benefits, and alternatives of induction of labor have been discussed the patient, including the risk of failed induction of labor, an increased risk of  delivery, and increased risk of fetal heart rate problems during the induction that may lead to emergent  delivery.  We have discussed the risks of medication use for  induction and augmentation of labor including prostaglandins, such as Cytotec and other agents such as oxytocin.  We have discussed that drugs such as this have warnings for use in pregnancy, however, have long established safety and efficacy for induction of labor or augmentation of labor when used appropriately.  We have discussed the use of a cervical ripening balloon for induction of labor and/or cervical ripening.  We have discussed the risks, benefits, and alternatives to this method of induction of labor we've also discussed that [employer] of [employer] gyne[employer] endorses the use of drugs such as these for induction of labor and augmentation of labor.  The patient expressed her understanding of these risks and wishes to proceed.  Plan of care has been reviewed with patient  Risks, benefits of treatment plan have been discussed.  All questions have been answered.    Jluis Aragon MD  7/7/2022  07:12 EDT

## 2022-07-07 NOTE — PROGRESS NOTES
" [facility]  Obstetric Intrapartum Progress Note    Subjective:  Contractions increasing    Objective:  Temp:  [98 °F (36.7 °C)-98.9 °F (37.2 °C)] 98.9 °F (37.2 °C)  Heart Rate:  [70-94] 73  Resp:  [16-18] 16  BP: (120-143)/(52-83) 143/83     Flowsheet Rows    Flowsheet Row First Filed Value   Admission Height 160 cm (63\") Documented at 07/07/2022 0604   Admission Weight 102 kg (225 lb) Documented at 07/07/2022 0604          Intake/Output last 24 hours:    Intake/Output Summary (Last 24 hours) at 7/7/2022 1200  Last data filed at 7/7/2022 0751  Gross per 24 hour   Intake 1000 ml   Output --   Net 1000 ml       Intake/Output this shift:  I/O this shift:  In: 1000 [I.V.:1000]  Out: -     FHR Tracing:  Baseline:  130  Variability:   Moderate  Accelerations: Present (32 weeks+) 15 x 15 bpm  Decelerations: Absent  Contractions:  Regular    Physical Exam:  General appearance: alert and in no distress  Cervix: Dilation: 5 cm              Effacement: 60%              Station: -2              Presentation: cephalic    AROM with clear fluid without difficulty.    Assessment:    Encounter for induction of labor    Obesity affecting pregnancy, antepartum    Anxiety disorder affecting pregnancy, antepartum    Asthma affecting pregnancy, antepartum    Category I  Reassuring fetus, Adequate progress, AROM, Clear fluid    Plan:  Continue pitocin  Continue to monitor  Active labor positioning  Reviewed course/progress/plan with pt, questions answered to her satisfaction, she desires to proceed as outlined.    Jluis Aragon MD 7/7/2022 12:00 EDT  "

## 2022-07-07 NOTE — PLAN OF CARE
Problem: Adult Inpatient Plan of Care  Goal: Plan of Care Review  Outcome: Ongoing, Progressing  Goal: Patient-Specific Goal (Individualized)  Outcome: Ongoing, Progressing  Goal: Absence of Hospital-Acquired Illness or Injury  Outcome: Ongoing, Progressing  Goal: Optimal Comfort and Wellbeing  Outcome: Ongoing, Progressing  Goal: Readiness for Transition of Care  Outcome: Ongoing, Progressing  Intervention: Mutually Develop Transition Plan  Recent Flowsheet Documentation  Taken 7/7/2022 0618 by Lena Boyer, RN  Equipment Currently Used at Home: none     Problem: Bleeding (Labor)  Goal: Hemostasis  Outcome: Ongoing, Progressing     Problem: Change in Fetal Wellbeing (Labor)  Goal: Stable Fetal Wellbeing  Outcome: Ongoing, Progressing     Problem: Delayed Labor Progression (Labor)  Goal: Effective Progression to Delivery  Outcome: Ongoing, Progressing     Problem: Infection (Labor)  Goal: Absence of Infection Signs and Symptoms  Outcome: Ongoing, Progressing     Problem: Labor Pain (Labor)  Goal: Acceptable Pain Control  Outcome: Ongoing, Progressing     Problem: Uterine Tachysystole (Labor)  Goal: Normal Uterine Contraction Pattern  Outcome: Ongoing, Progressing   Goal Outcome Evaluation:

## 2022-07-07 NOTE — PROGRESS NOTES
"[facility]  Obstetric Intrapartum Progress Note    Subjective:  No complaints    Objective:  Temp:  [98.8 °F (37.1 °C)] 98.8 °F (37.1 °C)  Heart Rate:  [82-94] 82  Resp:  [18] 18  BP: (128-134)/(52-83) 128/52     Flowsheet Rows    Flowsheet Row First Filed Value   Admission Height 160 cm (63\") Documented at 07/07/2022 0604   Admission Weight 102 kg (225 lb) Documented at 07/07/2022 0604          Intake/Output last 24 hours:  No intake or output data in the 24 hours ending 07/07/22 0740    Intake/Output this shift:  No intake/output data recorded.    FHR Tracing:  Baseline:  130  Variability:   Moderate  Accelerations: Present (32 weeks+) 15 x 15 bpm  Decelerations: Absent  Contractions:  Irregular    Physical Exam:  General appearance: alert and in no distress  Cervix: Dilation: 2cm              Effacement: 70%              Station: -2              Presentation: cephalic    Cook cervical ripening balloon placed without difficulty    Assessment:    Encounter for induction of labor    Obesity affecting pregnancy, antepartum    Anxiety disorder affecting pregnancy, antepartum    Asthma affecting pregnancy, antepartum    Category I  Reassuring fetus    Plan:  Start pitocin  Continue to monitor  Active labor positioning  Reviewed course/progress/plan with pt, questions answered to pt satisfaction, pt desires to proceed as outlined.  Pelvis feels clinically adequate  I have discussed with patient and family (if present) the current plan to include, but NLT appropriate expectations, to include possible length of time before delivery/hospital stay.  All questions have been answered to their satisfaction and they desire to proceed as noted.  Reviewed course/progress/plan with pt, questions answered to her satisfaction, she desires to proceed as outlined.    Jluis Aragon MD 7/7/2022 07:40 EDT  "

## 2022-07-07 NOTE — PLAN OF CARE
Problem: Adult Inpatient Plan of Care  Goal: Plan of Care Review  Outcome: Ongoing, Progressing  Goal: Patient-Specific Goal (Individualized)  Outcome: Ongoing, Progressing  Goal: Absence of Hospital-Acquired Illness or Injury  Outcome: Ongoing, Progressing  Intervention: Prevent and Manage VTE (Venous Thromboembolism) Risk  Recent Flowsheet Documentation  Taken 7/7/2022 0730 by Maryanne Frankel RN  Range of Motion: active ROM (range of motion) encouraged  Intervention: Prevent Infection  Recent Flowsheet Documentation  Taken 7/7/2022 0730 by Maryanne Frankel RN  Infection Prevention:   cohorting utilized   environmental surveillance performed   equipment surfaces disinfected   hand hygiene promoted   personal protective equipment utilized   rest/sleep promoted   single patient room provided   visitors restricted/screened  Goal: Optimal Comfort and Wellbeing  Outcome: Ongoing, Progressing  Intervention: Provide Person-Centered Care  Recent Flowsheet Documentation  Taken 7/7/2022 0830 by Maryanne Frankel RN  Trust Relationship/Rapport:   care explained   choices provided   emotional support provided   empathic listening provided   questions answered   questions encouraged   reassurance provided   thoughts/feelings acknowledged  Taken 7/7/2022 0735 by Maryanne Frankel RN  Trust Relationship/Rapport:   care explained   choices provided   emotional support provided   empathic listening provided   questions answered   questions encouraged   reassurance provided   thoughts/feelings acknowledged  Goal: Readiness for Transition of Care  Outcome: Ongoing, Progressing     Problem: Bleeding (Labor)  Goal: Hemostasis  Outcome: Ongoing, Progressing     Problem: Change in Fetal Wellbeing (Labor)  Goal: Stable Fetal Wellbeing  Outcome: Ongoing, Progressing     Problem: Delayed Labor Progression (Labor)  Goal: Effective Progression to Delivery  Outcome: Ongoing, Progressing     Problem: Infection (Labor)  Goal: Absence of  Infection Signs and Symptoms  Outcome: Ongoing, Progressing  Intervention: Prevent or Manage Infection  Recent Flowsheet Documentation  Taken 7/7/2022 0930 by Maryanne Frankel RN  Infection Prevention:   cohorting utilized   environmental surveillance performed   equipment surfaces disinfected   hand hygiene promoted   personal protective equipment utilized   rest/sleep promoted   single patient room provided   visitors restricted/screened     Problem: Labor Pain (Labor)  Goal: Acceptable Pain Control  Outcome: Ongoing, Progressing     Problem: Uterine Tachysystole (Labor)  Goal: Normal Uterine Contraction Pattern  Outcome: Ongoing, Progressing   Goal Outcome Evaluation:      Here for induction of labor, planning epidural for pain management, going to breast and bottle feed baby, tolerating clear liquids, IV infusing without difficulty, ambulating to bathroom, voiding without difficulty, Villanueva baldwin bulb in place, significant other at bedside.

## 2022-07-07 NOTE — ANESTHESIA PROCEDURE NOTES
Labor Epidural      Patient reassessed immediately prior to procedure    Patient location during procedure: OB  Start Time: 7/7/2022 12:28 PM  Performed By  Anesthesiologist: Jostin Griffith MD  Preanesthetic Checklist  Completed: patient identified, IV checked, risks and benefits discussed, surgical consent, monitors and equipment checked, pre-op evaluation and timeout performed  Prep:  Pt Position:sitting  Sterile Tech:cap, gloves, sterile barrier, mask and gown  Prep:chlorhexidine gluconate and isopropyl alcohol  Monitoring:blood pressure monitoring, continuous pulse oximetry and EKG  Epidural Block Procedure:  Approach:midline  Guidance:landmark technique and palpation technique  Location:L3-L4  Needle Type:Tuohy  Needle Gauge:17 G  Loss of Resistance Medium: saline  Loss of Resistance: 6cm  Cath Depth at skin:12 cm  Paresthesia: none  Aspiration:negative  Test Dose:negative  Medication: fentaNYL citrate (PF) (SUBLIMAZE) injection, 100 mcg  lidocaine 1.5%-EPINEPHrine 1:200,000 (XYLOCAINE W/EPI) injection, 3 mL  Med administered at 7/7/2022 12:38 PM  Number of Attempts: 1  Post Assessment:  Dressing:occlusive dressing applied and secured with tape  Pt Tolerance:patient tolerated the procedure well with no apparent complications  Complications:no

## 2022-07-08 LAB
HCT VFR BLD AUTO: 34.4 % (ref 34–46.6)
HGB BLD-MCNC: 11.2 G/DL (ref 12–15.9)

## 2022-07-08 PROCEDURE — 85014 HEMATOCRIT: CPT | Performed by: OBSTETRICS & GYNECOLOGY

## 2022-07-08 PROCEDURE — 85018 HEMOGLOBIN: CPT | Performed by: OBSTETRICS & GYNECOLOGY

## 2022-07-08 RX ADMIN — IBUPROFEN 600 MG: 600 TABLET ORAL at 12:14

## 2022-07-08 RX ADMIN — PRENATAL WITH FERROUS FUM AND FOLIC ACID 1 TABLET: 3080; 920; 120; 400; 22; 1.84; 3; 20; 10; 1; 12; 200; 27; 25; 2 TABLET ORAL at 09:02

## 2022-07-08 RX ADMIN — IBUPROFEN 600 MG: 600 TABLET ORAL at 04:07

## 2022-07-08 RX ADMIN — IBUPROFEN 600 MG: 600 TABLET ORAL at 18:21

## 2022-07-08 RX ADMIN — DOCUSATE SODIUM 100 MG: 100 CAPSULE, LIQUID FILLED ORAL at 04:28

## 2022-07-08 NOTE — PLAN OF CARE
Problem: Adult Inpatient Plan of Care  Goal: Plan of Care Review  Outcome: Ongoing, Progressing  Goal: Patient-Specific Goal (Individualized)  Outcome: Ongoing, Progressing  Goal: Absence of Hospital-Acquired Illness or Injury  Outcome: Ongoing, Progressing  Intervention: Identify and Manage Fall Risk  Recent Flowsheet Documentation  Taken 7/8/2022 0428 by Lena Boyer RN  Safety Promotion/Fall Prevention: safety round/check completed  Taken 7/8/2022 0407 by Lena Boyer RN  Safety Promotion/Fall Prevention: safety round/check completed  Taken 7/8/2022 0145 by Lena Boyer RN  Safety Promotion/Fall Prevention: safety round/check completed  Taken 7/7/2022 2327 by Lena Boyer RN  Safety Promotion/Fall Prevention: safety round/check completed  Taken 7/7/2022 2304 by Lena Boyer RN  Safety Promotion/Fall Prevention: safety round/check completed  Taken 7/7/2022 2204 by Lena Boyer RN  Safety Promotion/Fall Prevention: safety round/check completed  Intervention: Prevent and Manage VTE (Venous Thromboembolism) Risk  Recent Flowsheet Documentation  Taken 7/7/2022 2116 by Lena Boyer RN  Activity Management: ambulated to bathroom  Taken 7/7/2022 2045 by Lena Boyer RN  Activity Management: bedrest  Taken 7/7/2022 2017 by Lena Boyer RN  Activity Management: bedrest  Taken 7/7/2022 1945 by Lena Boyer RN  Activity Management: bedrest  Taken 7/7/2022 1915 by Lena Boyer RN  Activity Management: bedrest  Goal: Optimal Comfort and Wellbeing  Outcome: Ongoing, Progressing  Intervention: Monitor Pain and Promote Comfort  Recent Flowsheet Documentation  Taken 7/8/2022 0407 by Lena Boyer RN  Pain Management Interventions:   see MAR   pain management plan reviewed with patient/caregiver   position adjusted   care clustered   quiet environment facilitated  Taken 7/7/2022 2204 by Lena Boyer RN  Pain Management Interventions:   see MAR   pain management plan reviewed with  patient/caregiver   position adjusted   care clustered   quiet environment facilitated  Intervention: Provide Person-Centered Care  Recent Flowsheet Documentation  Taken 7/7/2022 1930 by Lena Boyer, RN  Trust Relationship/Rapport:   care explained   choices provided   emotional support provided   empathic listening provided   questions answered   questions encouraged   reassurance provided   thoughts/feelings acknowledged  Goal: Readiness for Transition of Care  Outcome: Ongoing, Progressing     Problem: Adjustment to Role Transition (Postpartum Vaginal Delivery)  Goal: Successful Maternal Role Transition  Outcome: Ongoing, Progressing     Problem: Bleeding (Postpartum Vaginal Delivery)  Goal: Hemostasis  Outcome: Ongoing, Progressing     Problem: Infection (Postpartum Vaginal Delivery)  Goal: Absence of Infection Signs/Symptoms  Outcome: Ongoing, Progressing  Intervention: Prevent or Manage Infection  Recent Flowsheet Documentation  Taken 7/7/2022 2116 by Lena Boyer, RN  Perineal Care:   absorbent pad changed   perineum cleansed     Problem: Pain (Postpartum Vaginal Delivery)  Goal: Acceptable Pain Control  Outcome: Ongoing, Progressing  Intervention: Prevent or Manage Pain  Recent Flowsheet Documentation  Taken 7/8/2022 0407 by Lena Boyer, RN  Pain Management Interventions:   see MAR   pain management plan reviewed with patient/caregiver   position adjusted   care clustered   quiet environment facilitated  Taken 7/7/2022 2204 by Lena Boyer, RN  Pain Management Interventions:   see MAR   pain management plan reviewed with patient/caregiver   position adjusted   care clustered   quiet environment facilitated     Problem: Urinary Retention (Postpartum Vaginal Delivery)  Goal: Effective Urinary Elimination  Outcome: Ongoing, Progressing   Goal Outcome Evaluation:

## 2022-07-08 NOTE — PROGRESS NOTES
"PostPartum/PostOp PROGRESS NOTE        Subjective:  Patient has no complaints  Pain controlled  Tolerating a regular diet  Passing flatus  Ambulating  Urinating spontaneously  Lochia decreasing, no bleeding concerns  Denies HA, vision change, or RUQ/epigastric pain    Objective:  Vitals: Blood pressure 132/64, pulse 85, temperature 97.9 °F (36.6 °C), temperature source Oral, resp. rate 18, height 160 cm (63\"), weight 102 kg (225 lb), SpO2 100 %, currently breastfeeding.          General: NAD, alert and oriented, appropriate  Psych: Normal mood, appropriate  Abdomen: Fundus firm, non tender, Soft, non distended, non tender, normal active bowel sounds and Fundus at U-2  Extremeties: +1 edema    Labs:  Lab Results (last 24 hours)     Procedure Component Value Units Date/Time    CBC & Differential [[id number]]  (Abnormal) Collected: 07/07/22 0837    Specimen: Blood from Hand, Right Updated: 07/07/22 0858    Narrative:      The following orders were created for panel order CBC & Differential.  Procedure                               Abnormality         Status                     ---------                               -----------         ------                     CBC Auto Differential[[id number]]        Abnormal            Final result                 Please view results for these tests on the individual orders.    CBC Auto Differential [[id number]]  (Abnormal) Collected: 07/07/22 0837    Specimen: Blood from Hand, Right Updated: 07/07/22 0858     WBC 6.97 10*3/mm3      RBC 4.28 10*6/mm3      Hemoglobin 11.9 g/dL      Hematocrit 36.6 %      MCV 85.5 fL      MCH 27.8 pg      MCHC 32.5 g/dL      RDW 16.0 %      RDW-SD 48.7 fl      MPV 12.2 fL      Platelets 193 10*3/mm3      Neutrophil % 70.7 %      Lymphocyte % 16.4 %      Monocyte % 6.5 %      Eosinophil % 5.6 %      Basophil % 0.4 %      Immature Grans % 0.4 %      Neutrophils, Absolute 4.93 10*3/mm3      Lymphocytes, Absolute 1.14 10*3/mm3      Monocytes, Absolute 0.45 " 10*3/mm3      Eosinophils, Absolute 0.39 10*3/mm3      Basophils, Absolute 0.03 10*3/mm3      Immature Grans, Absolute 0.03 10*3/mm3      nRBC 0.0 /100 WBC     Blood Gas, Arterial, Cord [[id number]]  (Abnormal) Collected: 22    Specimen: Cord Blood Arterial from Umbilical Cord Updated: 22 184     pH, Cord Arterial 7.31 pH Units      pCO2, Cord Arterial 49.9 mmHg      Base Exc, Cord Arterial -2.3 mmol/L      pO2, Cord Arterial <40.5 mmHg      HCO3, Cord Arterial 24.6 mmol/L     Blood Gas, Venous, Cord [[id number]]  (Abnormal) Collected: 22    Specimen: Cord Blood Venous from Umbilical Cord Updated: 22 185     pH, Cord Venous 7.330 pH Units      pCO2, Cord Venous 44.0 mm Hg      pO2, Cord Venous <40.5 mm Hg      Base Excess, Cord Venous -3.3 mmol/L      HCO3, Cord Venous 22.7 mmol/L     Hemoglobin & Hematocrit, Blood [[id number]]  (Abnormal) Collected: 22    Specimen: Blood Updated: 22     Hemoglobin 11.2 g/dL      Hematocrit 34.4 %             Assessment:    Post-partum/postop Day:  1  Status post     Plan:   Routine postpartum/postop care  Ambulate, Remove IV, Shower, Sitz baths, PO pain meds, Importance of wound care/keep clean and dry, Breast feeding support    Electronically signed by Jluis Aragon MD, 22, 7:13 AM EDT.

## 2022-07-08 NOTE — DISCHARGE SUMMARY
[facility]         DISCHARGE SUMMARY    Patient Name: Iftikhar Russell  : 2000  MRN: [id number]    Date of Admission: 2022  Date of Discharge: 2022  Primary Care Physician: Rogelio Mejia MD    Consults     No orders found for last 30 day(s).           Procedures:  Spontaneous vaginal delivery    Presenting Problem:   Maternal obesity affecting pregnancy, antepartum [O99.210]    Admitting Diagnosis:  21-year-old  2 para 1 at 39 weeks and 4 days gestation  Obesity  Anxiety  Asthma  Induction of labor    Discharge Diagnosis:  21-year-old  2 para 1 at 39 weeks and 4 days gestation  Obesity  Anxiety  Asthma  Induction of labor  Spontaneous vaginal delivery  Second-degree midline laceration    Delivery Summary     OB Surgeon: Jluis Aragon MD  Anesthesia: Epidural  Delivery Type:   Perineum: OBPERINEUM: 2nd degree laceration  Feeding method: Breast    Infant: male  infant;    Weight: 3320 g (7 lb 5.1 oz)     APGARS: 8  @ 1 minute / 9  @ 5 minutes   Venous Blood Gas:   pH, Cord Venous   Date Value Ref Range Status   2022 7.330 7.310 - 7.370 pH Units Final      Arterial Blood Gas:   pH, Cord Arterial   Date Value Ref Range Status   2022 7.31 7.21 - 7.31 pH Units Final        Hospital Course     Hospital Course:  Iftikhar Russell is a 21 y.o.  39w4d who presented on 2022 for induction of labor secondary to maternal obesity.  The patient's induction was carried out with a cervical ripening balloon and low-dose oxytocin.  After the cervical ripening balloon was expelled, the patient underwent amniotomy.  Short, after amniotomy the patient had an epidural placed.  The patient progressed adequately and went on to have an uncomplicated spontaneous vaginal delivery.  For full details of that procedure please see the separate dictated delivery summary.  After initial recovery in the labor room the patient was transferred to the postpartum unit for further  postpartum care.  She had an uncomplicated postpartum course.  By day of discharge she was doing well.  She was afebrile stable vital signs throughout the hospital stay.  Her lochia was appropriate and her exam was benign.  Her pain was well controlled.  Her infant did well.  She desired discharge home.    Day of Discharge     Vital Signs:  Temp:  [97.9 °F (36.6 °C)-99.5 °F (37.5 °C)] 97.9 °F (36.6 °C)  Heart Rate:  [] 85  Resp:  [16-20] 18  BP: ()/(44-94) 132/64     Discharge exam:  Lungs are clear to auscultation bilaterally.  Heart is regular rate and rhythm.  Abdomen soft nontender and uterus firm at U- 3 station.    Pertinent  and/or Most Recent Results     LAB RESULTS:       Lab 07/08/22  0612 07/07/22  0837   WBC  --  6.97   HEMOGLOBIN 11.2* 11.9*   HEMATOCRIT 34.4 36.6   PLATELETS  --  193   NEUTROS ABS  --  4.93   IMMATURE GRANS (ABS)  --  0.03   LYMPHS ABS  --  1.14   MONOS ABS  --  0.45   EOS ABS  --  0.39   MCV  --  85.5                 Lab 07/07/22  0643   ABO TYPING O   RH TYPING Positive   ANTIBODY SCREEN Negative     URINALYSIS@  Microbiology Results (last 10 days)     Procedure Component Value - Date/Time    COVID PRE-OP / PRE-PROCEDURE SCREENING ORDER (NO ISOLATION) - Swab, Nasopharynx [[id number]]  (Normal) Collected: 07/05/22 1128    Lab Status: Final result Specimen: Swab from Nasopharynx Updated: 07/05/22 1720    Narrative:      The following orders were created for panel order COVID PRE-OP / PRE-PROCEDURE SCREENING ORDER (NO ISOLATION) - Swab, Nasopharynx.  Procedure                               Abnormality         Status                     ---------                               -----------         ------                     COVID-19,APTIMA PANTHER(...[[id number]]  Normal              Final result                 Please view results for these tests on the individual orders.    COVID-19,APTIMA PANTHER(ESTHER),[facility], NP/OP SWAB IN UTM/VTM/SALINE TRANSPORT MEDIA,24 HR TAT -  Swab, Nasopharynx [[id number]]  (Normal) Collected: 07/05/22 1128    Lab Status: Final result Specimen: Swab from Nasopharynx Updated: 07/05/22 1720     COVID19 Not Detected    Narrative:      Fact sheet for providers: https://www.fda.gov/media/532881/download     Fact sheet for patients: https://www.fda.gov/media/783196/download    Test performed by RT PCR.             Discharge Details        Discharge Medications      ASK your doctor about these medications      Instructions Start Date   albuterol sulfate  (90 Base) MCG/ACT inhaler  Commonly known as: PROVENTIL HFA;VENTOLIN HFA;PROAIR HFA   Every 4 Hours Scheduled      ascorbic acid 1000 MG tablet  Commonly known as: VITAMIN C   Every 24 Hours      Prenatal 28-0.8 MG tablet   Every 24 Hours      vitamin D3 125 MCG (5000 UT) tablet tablet   1 tablet, Oral, Daily             Allergies   Allergen Reactions   • Lavender Oil Shortness Of Breath       Discharge Disposition:   Home, self-care    Discharge Condition:  Good    Diet:   Regular    Discharge Activity:   Gradually resume normal activity  Pelvic rest, nothing in the vagina, no tampons, no douching, and no intercourse for 6 weeks.    Follow Up:  Future Appointments   Date Time Provider Department Center   8/16/2022  8:50 AM Jluis Aragon MD [facility]       Electronically signed by Jluis Aragon MD, 07/08/22, 7:19 AM EDT.

## 2022-07-08 NOTE — PLAN OF CARE
Problem: Adult Inpatient Plan of Care  Goal: Plan of Care Review  Outcome: Ongoing, Progressing  Goal: Patient-Specific Goal (Individualized)  Outcome: Ongoing, Progressing  Goal: Absence of Hospital-Acquired Illness or Injury  Outcome: Ongoing, Progressing  Intervention: Identify and Manage Fall Risk  Recent Flowsheet Documentation  Taken 7/8/2022 0800 by Hazel Menard RN  Safety Promotion/Fall Prevention: safety round/check completed  Intervention: Prevent Skin Injury  Recent Flowsheet Documentation  Taken 7/8/2022 0800 by Hazel Menard RN  Body Position: position changed independently  Intervention: Prevent and Manage VTE (Venous Thromboembolism) Risk  Recent Flowsheet Documentation  Taken 7/8/2022 0800 by Hazel Menard RN  Activity Management: up ad deepak  Goal: Optimal Comfort and Wellbeing  Outcome: Ongoing, Progressing  Intervention: Monitor Pain and Promote Comfort  Recent Flowsheet Documentation  Taken 7/8/2022 1214 by Hazel Menard RN  Pain Management Interventions:   quiet environment facilitated   see MAR  Taken 7/8/2022 0800 by Hazel Menard RN  Pain Management Interventions:   quiet environment facilitated   see MAR  Intervention: Provide Person-Centered Care  Recent Flowsheet Documentation  Taken 7/8/2022 0800 by Hazel Menard RN  Trust Relationship/Rapport:   care explained   choices provided   emotional support provided   empathic listening provided   questions answered   questions encouraged   reassurance provided   thoughts/feelings acknowledged  Goal: Readiness for Transition of Care  Outcome: Ongoing, Progressing     Problem: Change in Fetal Wellbeing (Labor)  Goal: Stable Fetal Wellbeing  Intervention: Promote and Monitor Fetal Wellbeing  Recent Flowsheet Documentation  Taken 7/8/2022 0800 by Hazel Menard RN  Body Position: position changed independently     Problem: Adjustment to Role Transition (Postpartum Vaginal Delivery)  Goal: Successful Maternal Role Transition  Outcome: Ongoing,  Progressing     Problem: Bleeding (Postpartum Vaginal Delivery)  Goal: Hemostasis  Outcome: Ongoing, Progressing     Problem: Infection (Postpartum Vaginal Delivery)  Goal: Absence of Infection Signs/Symptoms  Outcome: Ongoing, Progressing  Intervention: Prevent or Manage Infection  Recent Flowsheet Documentation  Taken 7/8/2022 0800 by Hazel Menard, RN  Perineal Care: absorbent pad changed     Problem: Pain (Postpartum Vaginal Delivery)  Goal: Acceptable Pain Control  Outcome: Ongoing, Progressing  Intervention: Prevent or Manage Pain  Recent Flowsheet Documentation  Taken 7/8/2022 1214 by Hazel Menard, RN  Pain Management Interventions:   quiet environment facilitated   see MAR  Taken 7/8/2022 0800 by Hazel Menard, RN  Pain Management Interventions:   quiet environment facilitated   see MAR     Problem: Urinary Retention (Postpartum Vaginal Delivery)  Goal: Effective Urinary Elimination  Outcome: Ongoing, Progressing     Problem: Breastfeeding  Goal: Effective Breastfeeding  Outcome: Ongoing, Progressing            Ongoing progression towards goal. Pain controlled. No more c/o right breast pain r/t fitted breast pump/pump adjustment. Breastfeeding, pumping and supplementing feeds well. Mother-baby interaction good. Eating and voiding well.

## 2022-07-08 NOTE — LACTATION NOTE
LC in to follow up with success with pumping. Pateint states the pumping with her personal pump was not painful and she expressed about 5 ml. She states the breastfeeding with the nipple shield was very painful. LC suggested her to exclusively pump if breastfeeding is too painful. No nipple trauma seen and infant's suck and latch do not seem to be the problem. LC suggested that she may have ultra sensitive nipples.  LC discussed normal infant output patterns to expect and unlimited time/access to the breast but if infant is not waking by 3 hours to wake and feed using measures shown in the hospital. LC discussed checking to make sure new medications are safe to breastfeed. LC discussed alcohol use and cigarette/second hand smoke around baby and breastfeeding and discussed the impact of street drugs on infants and breastfeeding. LC used the page in the breastfeeding guide to discuss harmful effects of these. Breastfeeding/Lactation expectations and anticipatory guidance discussed for the next two weeks . LC discussed nipple care, plugged ducts, engorgement, and breast infection. LC encouraged mom to see pediatrician two days from discharge for follow up. Patient has a breastpump for home use and LC discussed good pumping guidelines and normal expectations with pumping and storage and preparation of ebm for feedings. LC discussed breastfeeding/lactation resources after discharge and when to call the doctor. Patient showed good understanding.

## 2022-07-09 VITALS
WEIGHT: 225 LBS | HEIGHT: 63 IN | TEMPERATURE: 98.1 F | RESPIRATION RATE: 18 BRPM | OXYGEN SATURATION: 100 % | SYSTOLIC BLOOD PRESSURE: 105 MMHG | HEART RATE: 87 BPM | DIASTOLIC BLOOD PRESSURE: 59 MMHG | BODY MASS INDEX: 39.87 KG/M2

## 2022-07-09 RX ORDER — IBUPROFEN 600 MG/1
600 TABLET ORAL EVERY 6 HOURS PRN
Qty: 60 TABLET | Refills: 1 | Status: SHIPPED | OUTPATIENT
Start: 2022-07-09 | End: 2022-08-23

## 2022-07-09 RX ORDER — HYDROCODONE BITARTRATE AND ACETAMINOPHEN 5; 325 MG/1; MG/1
1 TABLET ORAL EVERY 6 HOURS PRN
Qty: 8 TABLET | Refills: 0 | Status: SHIPPED | OUTPATIENT
Start: 2022-07-09 | End: 2022-07-15

## 2022-07-09 RX ADMIN — PRENATAL WITH FERROUS FUM AND FOLIC ACID 1 TABLET: 3080; 920; 120; 400; 22; 1.84; 3; 20; 10; 1; 12; 200; 27; 25; 2 TABLET ORAL at 09:12

## 2022-07-09 RX ADMIN — IBUPROFEN 600 MG: 600 TABLET ORAL at 09:12

## 2022-07-09 RX ADMIN — IBUPROFEN 600 MG: 600 TABLET ORAL at 02:20

## 2022-07-09 RX ADMIN — DOCUSATE SODIUM 100 MG: 100 CAPSULE, LIQUID FILLED ORAL at 09:12

## 2022-07-09 NOTE — CONSULTS
SW was consulted to see patient due to depression. SW followed up with patient and boyfriend at bedside. Patient confirmed history of depression and anxiety. Patient reported that she was on antidepressants medication for her depression and anxiety. But due to her pregnancy, her OB doctor took her off on the antidepressant’s medications that [facility] doctor has put her on. Patient reported that she cannot remember the name(s) of antidepressants was on. Patient will follow be following up with [facility] doctor to get her back on her antidepressant’s medications. Patient has contactor number for [facility], and she will contact the office herself. Patient has no other needs.    None known

## 2022-07-09 NOTE — PLAN OF CARE
Problem: Adult Inpatient Plan of Care  Goal: Plan of Care Review  Outcome: Ongoing, Progressing  Goal: Patient-Specific Goal (Individualized)  Outcome: Ongoing, Progressing  Goal: Absence of Hospital-Acquired Illness or Injury  Outcome: Ongoing, Progressing  Intervention: Identify and Manage Fall Risk  Recent Flowsheet Documentation  Taken 7/9/2022 0600 by Jennifer Johnson RN  Safety Promotion/Fall Prevention: safety round/check completed  Taken 7/9/2022 0500 by Jennifer Johnson RN  Safety Promotion/Fall Prevention: safety round/check completed  Taken 7/9/2022 0400 by Jennifer Johnson RN  Safety Promotion/Fall Prevention: safety round/check completed  Taken 7/9/2022 0300 by Jennifer Johnson RN  Safety Promotion/Fall Prevention: safety round/check completed  Taken 7/9/2022 0200 by Jennifer Johnson RN  Safety Promotion/Fall Prevention: safety round/check completed  Taken 7/9/2022 0100 by Jennifer Johnson RN  Safety Promotion/Fall Prevention: safety round/check completed  Intervention: Prevent and Manage VTE (Venous Thromboembolism) Risk  Recent Flowsheet Documentation  Taken 7/9/2022 0320 by Jennifer Johnson RN  Activity Management:   ambulated to bathroom   ambulated in room   up ad deepak  Goal: Optimal Comfort and Wellbeing  Outcome: Ongoing, Progressing  Intervention: Monitor Pain and Promote Comfort  Recent Flowsheet Documentation  Taken 7/9/2022 0320 by Jennifer Johnson RN  Pain Management Interventions:   see MAR   quiet environment facilitated  Taken 7/9/2022 0220 by Jennifer Johnson RN  Pain Management Interventions:   see MAR   quiet environment facilitated  Goal: Readiness for Transition of Care  Outcome: Ongoing, Progressing     Problem: Adjustment to Role Transition (Postpartum Vaginal Delivery)  Goal: Successful Maternal Role Transition  Outcome: Ongoing, Progressing     Problem: Bleeding (Postpartum Vaginal Delivery)  Goal: Hemostasis  Outcome: Ongoing, Progressing     Problem: Infection (Postpartum  Vaginal Delivery)  Goal: Absence of Infection Signs/Symptoms  Outcome: Ongoing, Progressing  Intervention: Prevent or Manage Infection  Recent Flowsheet Documentation  Taken 7/9/2022 0320 by Jennifer Johnson RN  Perineal Care:   absorbent pad changed   perineum cleansed     Problem: Pain (Postpartum Vaginal Delivery)  Goal: Acceptable Pain Control  Outcome: Ongoing, Progressing  Intervention: Prevent or Manage Pain  Recent Flowsheet Documentation  Taken 7/9/2022 0320 by Jennifer Johnson RN  Pain Management Interventions:   see MAR   quiet environment facilitated  Taken 7/9/2022 0220 by Jennifer Johnson RN  Pain Management Interventions:   see MAR   quiet environment facilitated     Problem: Urinary Retention (Postpartum Vaginal Delivery)  Goal: Effective Urinary Elimination  Outcome: Ongoing, Progressing     Problem: Breastfeeding  Goal: Effective Breastfeeding  Outcome: Ongoing, Progressing   Goal Outcome Evaluation:

## 2022-07-19 ENCOUNTER — TELEPHONE (OUTPATIENT)
Dept: LACTATION | Facility: HOSPITAL | Age: 22
End: 2022-07-19

## 2022-07-19 NOTE — TELEPHONE ENCOUNTER
LC called to check on lactation progress and she states that baby is latching and she is pumping and bottle feeding. LC encouraged her to call Lactation Dept for any needs and reminded her of the Lactation Dept resources and contact information.

## 2022-08-23 ENCOUNTER — POSTPARTUM VISIT (OUTPATIENT)
Dept: OBSTETRICS AND GYNECOLOGY | Facility: CLINIC | Age: 22
End: 2022-08-23

## 2022-08-23 VITALS
WEIGHT: 216 LBS | DIASTOLIC BLOOD PRESSURE: 67 MMHG | BODY MASS INDEX: 38.27 KG/M2 | SYSTOLIC BLOOD PRESSURE: 107 MMHG | HEIGHT: 63 IN | HEART RATE: 83 BPM

## 2022-08-23 DIAGNOSIS — Z30.09 UNWANTED FERTILITY: ICD-10-CM

## 2022-08-23 PROBLEM — F41.9 ANXIETY DISORDER AFFECTING PREGNANCY, ANTEPARTUM: Status: RESOLVED | Noted: 2021-12-26 | Resolved: 2022-08-23

## 2022-08-23 PROBLEM — J45.909 ASTHMA AFFECTING PREGNANCY, ANTEPARTUM: Status: RESOLVED | Noted: 2021-12-26 | Resolved: 2022-08-23

## 2022-08-23 PROBLEM — O99.519 ASTHMA AFFECTING PREGNANCY, ANTEPARTUM: Status: RESOLVED | Noted: 2021-12-26 | Resolved: 2022-08-23

## 2022-08-23 PROBLEM — Z34.90 ENCOUNTER FOR INDUCTION OF LABOR: Status: RESOLVED | Noted: 2022-07-07 | Resolved: 2022-08-23

## 2022-08-23 PROBLEM — O99.210 OBESITY AFFECTING PREGNANCY, ANTEPARTUM: Status: RESOLVED | Noted: 2021-12-26 | Resolved: 2022-08-23

## 2022-08-23 PROBLEM — O99.340 ANXIETY DISORDER AFFECTING PREGNANCY, ANTEPARTUM: Status: RESOLVED | Noted: 2021-12-26 | Resolved: 2022-08-23

## 2022-08-23 PROCEDURE — 99214 OFFICE O/P EST MOD 30 MIN: CPT | Performed by: OBSTETRICS & GYNECOLOGY

## 2022-08-23 RX ORDER — SODIUM CHLORIDE, SODIUM LACTATE, POTASSIUM CHLORIDE, CALCIUM CHLORIDE 600; 310; 30; 20 MG/100ML; MG/100ML; MG/100ML; MG/100ML
125 INJECTION, SOLUTION INTRAVENOUS CONTINUOUS
Status: CANCELLED | OUTPATIENT
Start: 2022-08-23

## 2022-08-23 RX ORDER — ONDANSETRON 2 MG/ML
4 INJECTION INTRAMUSCULAR; INTRAVENOUS EVERY 6 HOURS PRN
Status: CANCELLED | OUTPATIENT
Start: 2022-08-23

## 2022-08-23 NOTE — ASSESSMENT & PLAN NOTE
We have discussed the risks, benefits, and alternatives to the procedure.  We discussed the risks including the risk of infection, bleeding and hemorrhage, injury to nearby structure including, bowel, bladder, pelvic vasculature, pelvic nerves, ovaries, and the uterus, and other nearby structures.  We have discussed the risks of regret, risk of failure, and if failure occurred and increased risk for ectopic pregnancy.  We discussed the risk of menstrual changes, hormonal changes, and risk of pelvic pain post sterilization.  The patient has been offered alternative forms of birth control including: Oral contraceptives, NuvaRing, birth control patches, progesterone only birth control pills, Depo-Provera, all intrauterine contraceptives, partner vasectomy.  The patient expresses her understanding and wished to proceed with female permanent sterilization.  We have discussed the different methods of female sterilization including hysteroscopic and laparoscopic techniques.  With the laparoscopic techniques we have discussed occlusion of the tube with Filshie clips, Hulka clips, and Falope-Rings.  We have discussed cauterization of the fallopian tube, partial salpingectomy, and complete salpingectomy.  The patient desires to proceed with laparoscopic bilateral tubal occlusion with Filshie clips

## 2022-08-23 NOTE — ASSESSMENT & PLAN NOTE
Stable postpartum course  The patient desires permanent sterilization for birth control.  She declines any contraceptive methods until her sterilization takes place.  I recommend she avoid intercourse until surgery is completed.  May resume other normal activities  Recommend she continue prenatal vitamins

## 2022-08-23 NOTE — PROGRESS NOTES
"POSTPARTUM Follow Up Visit    CC:  Postpartum     HPI: Patient reports she is doing well.  She has no concerns today.  Does report she is having some depressive symptoms, but these are improving.  She also has scheduled a follow-up with a mental health provider to further discuss.  She is interested in permanent sterilization.  She states she she has no desire for any future childbearing.    Antepartum or Postpartum complications: Elevated BMI- pre-pregnancy  Delivery type:    Perineum: 2nd degree laceration  Feeding: Bottle     Pain:  No  Vaginal Bleeding:  Yes, on menses  EPDS score: 16  Plans for BC:  Tubal ligation  Last PAP:   Last Completed Pap Smear          PAP SMEAR (Every 3 Years) Next due on 2020  SCANNED - PAP SMEAR    2020  SCANNED - PAP SMEAR                /67   Pulse 83   Ht 160 cm (63\")   Wt 98 kg (216 lb)   LMP 2022   Breastfeeding No   BMI 38.26 kg/m²     Physical Exam  Vitals and nursing note reviewed.   Constitutional:       General: She is not in acute distress.     Appearance: Normal appearance. She is obese. She is not ill-appearing.   Abdominal:      General: Abdomen is flat. There is no distension.      Palpations: Abdomen is soft. There is no mass.      Tenderness: There is no abdominal tenderness. There is no guarding or rebound.      Hernia: No hernia is present.   Genitourinary:     General: Normal vulva.      Exam position: Lithotomy position.      Pubic Area: No rash.       Labia:         Right: No rash, tenderness, lesion or injury.         Left: No rash, tenderness, lesion or injury.       Urethra: No prolapse, urethral pain, urethral swelling or urethral lesion.      Vagina: Normal.      Cervix: Normal.      Uterus: Normal.       Adnexa: Right adnexa normal and left adnexa normal.      Comments: The perineum is well-healed.  No sutures are present.  Musculoskeletal:         General: No swelling.      Right lower leg: No edema.      " Left lower leg: No edema.   Skin:     General: Skin is warm and dry.      Findings: No rash.   Neurological:      Mental Status: She is alert and oriented to person, place, and time.   Psychiatric:         Mood and Affect: Mood normal.         Behavior: Behavior normal.         Thought Content: Thought content normal.         Judgment: Judgment normal.           ASSESSMENT AND PLAN:  Diagnoses and all orders for this visit:    1. Postpartum follow-up (Primary)  Assessment & Plan:  Stable postpartum course  The patient desires permanent sterilization for birth control.  She declines any contraceptive methods until her sterilization takes place.  I recommend she avoid intercourse until surgery is completed.  May resume other normal activities  Recommend she continue prenatal vitamins      2. Second degree perineal laceration during delivery    3.  (spontaneous vaginal delivery)    4. Unwanted fertility  Assessment & Plan:  We have discussed the risks, benefits, and alternatives to the procedure.  We discussed the risks including the risk of infection, bleeding and hemorrhage, injury to nearby structure including, bowel, bladder, pelvic vasculature, pelvic nerves, ovaries, and the uterus, and other nearby structures.  We have discussed the risks of regret, risk of failure, and if failure occurred and increased risk for ectopic pregnancy.  We discussed the risk of menstrual changes, hormonal changes, and risk of pelvic pain post sterilization.  The patient has been offered alternative forms of birth control including: Oral contraceptives, NuvaRing, birth control patches, progesterone only birth control pills, Depo-Provera, all intrauterine contraceptives, partner vasectomy.  The patient expresses her understanding and wished to proceed with female permanent sterilization.  We have discussed the different methods of female sterilization including hysteroscopic and laparoscopic techniques.  With the laparoscopic  techniques we have discussed occlusion of the tube with Filshie clips, Hulka clips, and Falope-Rings.  We have discussed cauterization of the fallopian tube, partial salpingectomy, and complete salpingectomy.  The patient desires to proceed with laparoscopic bilateral tubal occlusion with Filshie clips    Orders:  -     Case Request; Standing  -     Case Request    5. Postpartum depression  Assessment & Plan:  Keep follow-up with her mental health provider      Other orders  -     Follow Anesthesia Guidelines / Protocol; Future  -     Obtain Informed Consent; Future  -     Provide NPO Instructions to Patient; Future  -     Chlorhexidine Skin Prep; Future      Counseling:  All birth control options reviewed in detail.  R/B/A/SE/E of each wrt pts PMHx and prior BC use  DARCY risk w hormonal BC reviewed, S/Sx to watch for discussed and questions answered  May resume normal activities  Core strengthening exercises reviewed and recommended  Kegel exercises reviewed and recommended  Ok to return to work/school      Follow Up:  No follow-ups on file.    Jluis Aragon MD  08/23/2022

## 2022-09-22 ENCOUNTER — TELEPHONE (OUTPATIENT)
Dept: OBSTETRICS AND GYNECOLOGY | Facility: CLINIC | Age: 22
End: 2022-09-22

## 2022-09-22 NOTE — TELEPHONE ENCOUNTER
Patient has called and wants to cancel tubal scheduled for 9/29/22.  She has changed her mind.  Has f/u appointment for birth control options.

## 2022-11-23 ENCOUNTER — TELEPHONE (OUTPATIENT)
Dept: OBSTETRICS AND GYNECOLOGY | Facility: CLINIC | Age: 22
End: 2022-11-23

## 2022-11-29 ENCOUNTER — TELEPHONE (OUTPATIENT)
Dept: OBSTETRICS AND GYNECOLOGY | Facility: CLINIC | Age: 22
End: 2022-11-29

## 2022-11-30 ENCOUNTER — TELEPHONE (OUTPATIENT)
Dept: OBSTETRICS AND GYNECOLOGY | Facility: CLINIC | Age: 22
End: 2022-11-30

## 2023-01-17 ENCOUNTER — HOSPITAL ENCOUNTER (EMERGENCY)
Facility: HOSPITAL | Age: 23
Discharge: HOME | End: 2023-01-17
Attending: EMERGENCY MEDICINE | Admitting: EMERGENCY MEDICINE
Payer: COMMERCIAL

## 2023-01-17 ENCOUNTER — APPOINTMENT (OUTPATIENT)
Dept: GENERAL RADIOLOGY | Facility: HOSPITAL | Age: 23
End: 2023-01-17
Payer: COMMERCIAL

## 2023-01-17 VITALS
RESPIRATION RATE: 16 BRPM | HEIGHT: 63 IN | SYSTOLIC BLOOD PRESSURE: 125 MMHG | DIASTOLIC BLOOD PRESSURE: 74 MMHG | HEART RATE: 91 BPM | WEIGHT: 236.99 LBS | OXYGEN SATURATION: 98 % | TEMPERATURE: 98.6 F | BODY MASS INDEX: 41.99 KG/M2

## 2023-01-17 DIAGNOSIS — J45.21 MILD INTERMITTENT ASTHMA WITH EXACERBATION: Primary | ICD-10-CM

## 2023-01-17 LAB
ALBUMIN SERPL-MCNC: 4.1 G/DL (ref 3.5–5.2)
ALBUMIN/GLOB SERPL: 1.2 G/DL
ALP SERPL-CCNC: 122 U/L (ref 39–117)
ALT SERPL W P-5'-P-CCNC: 33 U/L (ref 1–33)
ANION GAP SERPL CALCULATED.3IONS-SCNC: 10.2 MMOL/L (ref 5–15)
AST SERPL-CCNC: 21 U/L (ref 1–32)
BASOPHILS # BLD AUTO: 0.04 10*3/MM3 (ref 0–0.2)
BASOPHILS NFR BLD AUTO: 0.6 % (ref 0–1.5)
BILIRUB SERPL-MCNC: 0.2 MG/DL (ref 0–1.2)
BUN SERPL-MCNC: 17 MG/DL (ref 6–20)
BUN/CREAT SERPL: 27.4 (ref 7–25)
CALCIUM SPEC-SCNC: 9.5 MG/DL (ref 8.6–10.5)
CHLORIDE SERPL-SCNC: 100 MMOL/L (ref 98–107)
CO2 SERPL-SCNC: 25.8 MMOL/L (ref 22–29)
CREAT SERPL-MCNC: 0.62 MG/DL (ref 0.57–1)
DEPRECATED RDW RBC AUTO: 47.5 FL (ref 37–54)
EGFRCR SERPLBLD CKD-EPI 2021: 127.7 ML/MIN/1.73
EOSINOPHIL # BLD AUTO: 0.26 10*3/MM3 (ref 0–0.4)
EOSINOPHIL NFR BLD AUTO: 3.8 % (ref 0.3–6.2)
ERYTHROCYTE [DISTWIDTH] IN BLOOD BY AUTOMATED COUNT: 14.9 % (ref 12.3–15.4)
GLOBULIN UR ELPH-MCNC: 3.3 GM/DL
GLUCOSE SERPL-MCNC: 149 MG/DL (ref 65–99)
HCT VFR BLD AUTO: 40 % (ref 34–46.6)
HGB BLD-MCNC: 13.1 G/DL (ref 12–15.9)
HOLD SPECIMEN: NORMAL
HOLD SPECIMEN: NORMAL
IMM GRANULOCYTES # BLD AUTO: 0.05 10*3/MM3 (ref 0–0.05)
IMM GRANULOCYTES NFR BLD AUTO: 0.7 % (ref 0–0.5)
LYMPHOCYTES # BLD AUTO: 1.88 10*3/MM3 (ref 0.7–3.1)
LYMPHOCYTES NFR BLD AUTO: 27.1 % (ref 19.6–45.3)
MCH RBC QN AUTO: 28.5 PG (ref 26.6–33)
MCHC RBC AUTO-ENTMCNC: 32.8 G/DL (ref 31.5–35.7)
MCV RBC AUTO: 87 FL (ref 79–97)
MONOCYTES # BLD AUTO: 0.55 10*3/MM3 (ref 0.1–0.9)
MONOCYTES NFR BLD AUTO: 7.9 % (ref 5–12)
NEUTROPHILS NFR BLD AUTO: 4.15 10*3/MM3 (ref 1.7–7)
NEUTROPHILS NFR BLD AUTO: 59.9 % (ref 42.7–76)
NRBC BLD AUTO-RTO: 0 /100 WBC (ref 0–0.2)
NT-PROBNP SERPL-MCNC: <36 PG/ML (ref 0–450)
PLATELET # BLD AUTO: 304 10*3/MM3 (ref 140–450)
PMV BLD AUTO: 10.8 FL (ref 6–12)
POTASSIUM SERPL-SCNC: 3.9 MMOL/L (ref 3.5–5.2)
PROT SERPL-MCNC: 7.4 G/DL (ref 6–8.5)
RBC # BLD AUTO: 4.6 10*6/MM3 (ref 3.77–5.28)
SODIUM SERPL-SCNC: 136 MMOL/L (ref 136–145)
TROPONIN T SERPL-MCNC: <0.01 NG/ML (ref 0–0.03)
WBC NRBC COR # BLD: 6.93 10*3/MM3 (ref 3.4–10.8)
WHOLE BLOOD HOLD COAG: NORMAL
WHOLE BLOOD HOLD SPECIMEN: NORMAL

## 2023-01-17 PROCEDURE — 93010 ELECTROCARDIOGRAM REPORT: CPT | Performed by: INTERNAL MEDICINE

## 2023-01-17 PROCEDURE — 85025 COMPLETE CBC W/AUTO DIFF WBC: CPT

## 2023-01-17 PROCEDURE — 25010000002 DEXAMETHASONE SODIUM PHOSPHATE 10 MG/ML SOLUTION: Performed by: EMERGENCY MEDICINE

## 2023-01-17 PROCEDURE — 94760 N-INVAS EAR/PLS OXIMETRY 1: CPT

## 2023-01-17 PROCEDURE — 36415 COLL VENOUS BLD VENIPUNCTURE: CPT

## 2023-01-17 PROCEDURE — 94640 AIRWAY INHALATION TREATMENT: CPT

## 2023-01-17 PROCEDURE — 80053 COMPREHEN METABOLIC PANEL: CPT

## 2023-01-17 PROCEDURE — 93005 ELECTROCARDIOGRAM TRACING: CPT

## 2023-01-17 PROCEDURE — 94799 UNLISTED PULMONARY SVC/PX: CPT

## 2023-01-17 PROCEDURE — 71045 X-RAY EXAM CHEST 1 VIEW: CPT

## 2023-01-17 PROCEDURE — 99284 EMERGENCY DEPT VISIT MOD MDM: CPT

## 2023-01-17 PROCEDURE — 83880 ASSAY OF NATRIURETIC PEPTIDE: CPT

## 2023-01-17 PROCEDURE — 84484 ASSAY OF TROPONIN QUANT: CPT

## 2023-01-17 PROCEDURE — 96372 THER/PROPH/DIAG INJ SC/IM: CPT

## 2023-01-17 RX ORDER — DEXAMETHASONE SODIUM PHOSPHATE 10 MG/ML
10 INJECTION, SOLUTION INTRAMUSCULAR; INTRAVENOUS ONCE
Status: DISCONTINUED | OUTPATIENT
Start: 2023-01-17 | End: 2023-01-17

## 2023-01-17 RX ORDER — ALBUTEROL SULFATE 2.5 MG/3ML
2.5 SOLUTION RESPIRATORY (INHALATION) EVERY 6 HOURS PRN
Qty: 25 EACH | Refills: 0 | Status: SHIPPED | OUTPATIENT
Start: 2023-01-17

## 2023-01-17 RX ORDER — SODIUM CHLORIDE 0.9 % (FLUSH) 0.9 %
10 SYRINGE (ML) INJECTION AS NEEDED
Status: DISCONTINUED | OUTPATIENT
Start: 2023-01-17 | End: 2023-01-17 | Stop reason: HOSPADM

## 2023-01-17 RX ORDER — PREDNISONE 20 MG/1
60 TABLET ORAL DAILY
Qty: 15 TABLET | Refills: 0 | Status: SHIPPED | OUTPATIENT
Start: 2023-01-17 | End: 2023-01-22

## 2023-01-17 RX ORDER — DEXAMETHASONE SODIUM PHOSPHATE 10 MG/ML
10 INJECTION, SOLUTION INTRAMUSCULAR; INTRAVENOUS ONCE
Status: COMPLETED | OUTPATIENT
Start: 2023-01-17 | End: 2023-01-17

## 2023-01-17 RX ORDER — ALBUTEROL SULFATE 90 UG/1
2 AEROSOL, METERED RESPIRATORY (INHALATION) EVERY 6 HOURS PRN
Qty: 18 G | Refills: 0 | Status: SHIPPED | OUTPATIENT
Start: 2023-01-17

## 2023-01-17 RX ORDER — IPRATROPIUM BROMIDE AND ALBUTEROL SULFATE 2.5; .5 MG/3ML; MG/3ML
3 SOLUTION RESPIRATORY (INHALATION) ONCE
Status: COMPLETED | OUTPATIENT
Start: 2023-01-17 | End: 2023-01-17

## 2023-01-17 RX ADMIN — DEXAMETHASONE SODIUM PHOSPHATE 10 MG: 10 INJECTION, SOLUTION INTRAMUSCULAR; INTRAVENOUS at 01:45

## 2023-01-17 RX ADMIN — IPRATROPIUM BROMIDE AND ALBUTEROL SULFATE 3 ML: 2.5; .5 SOLUTION RESPIRATORY (INHALATION) at 01:34

## 2023-01-17 NOTE — ED PROVIDER NOTES
Time: 1:20 AM EST  Date of encounter:  1/17/2023  Independent Historian/Clinical History and Information was obtained by:   Patient  Chief Complaint: Shortness of breath    History is limited by: N/A    History of Present Illness:  Patient is a 22 y.o. year old female who presents to the emergency department for evaluation of shortness of breath      History provided by:  Patient  Shortness of Breath  Severity:  Moderate  Onset quality:  Gradual  Duration:  2 hours  Timing:  Constant  Progression:  Unchanged  Chronicity:  Recurrent  Context comment:  She has a history of asthma and started feeling short of breath a few hours ago.  Has been out of nebs and inhalers for a long time  Relieved by:  Nothing  Worsened by:  Nothing  Ineffective treatments:  None tried  Associated symptoms: chest pain (Tightness), cough and wheezing    Associated symptoms: no abdominal pain, no claudication, no diaphoresis, no ear pain, no fever, no headaches, no hemoptysis, no neck pain, no PND, no rash, no sore throat, no sputum production, no syncope, no swollen glands and no vomiting    Risk factors: obesity        Patient Care Team  Primary Care Provider: Rogelio Mejia MD    Past Medical History:     Allergies   Allergen Reactions   • Lavender Oil Shortness Of Breath     Past Medical History:   Diagnosis Date   • Asthma     SEASONAL   • Nausea and vomiting during pregnancy 6/25/2022   • Other microscopic hematuria 4/11/2022 4/11/2022 r sided flank pain, acute onset, urine dip moderate blood, straight cath 0-2 RBC; no WBC. Renal U/S unremarkable ; suspect small stone vs. Bladder irritation in pregnancy; strain urine. Tylenol as needed.      Past Surgical History:   Procedure Laterality Date   • WISDOM TOOTH EXTRACTION       Family History   Problem Relation Age of Onset   • Diabetes Mother        Home Medications:  Prior to Admission medications    Not on File        Social History:   Social History     Tobacco Use   • Smoking status:  "Never   • Smokeless tobacco: Never   Vaping Use   • Vaping Use: Never used   Substance Use Topics   • Alcohol use: Not Currently   • Drug use: Never         Review of Systems:  Review of Systems   Constitutional: Negative for chills, diaphoresis and fever.   HENT: Negative for congestion, ear pain and sore throat.    Eyes: Negative for pain.   Respiratory: Positive for cough, shortness of breath and wheezing. Negative for hemoptysis, sputum production and chest tightness.    Cardiovascular: Positive for chest pain (Tightness). Negative for palpitations, claudication, leg swelling, syncope and PND.   Gastrointestinal: Negative for abdominal pain, diarrhea, nausea and vomiting.   Genitourinary: Negative for difficulty urinating, dysuria, flank pain and hematuria.   Musculoskeletal: Negative for joint swelling and neck pain.   Skin: Negative for pallor and rash.   Neurological: Negative for seizures and headaches.   Hematological: Negative.    Psychiatric/Behavioral: Negative.    All other systems reviewed and are negative.       Physical Exam:  /74   Pulse 91   Temp 98.6 °F (37 °C) (Oral)   Resp 16   Ht 160 cm (63\")   Wt 107 kg (236 lb 15.9 oz)   SpO2 98%   Breastfeeding No   BMI 41.98 kg/m²     Physical Exam  Vitals and nursing note reviewed.   Constitutional:       General: She is not in acute distress.     Appearance: Normal appearance. She is not toxic-appearing.   HENT:      Head: Normocephalic and atraumatic.      Right Ear: Hearing, tympanic membrane, ear canal and external ear normal.      Left Ear: Hearing, tympanic membrane, ear canal and external ear normal.      Nose: Nose normal.      Mouth/Throat:      Mouth: Mucous membranes are moist.   Eyes:      General: No scleral icterus.     Pupils: Pupils are equal, round, and reactive to light.   Cardiovascular:      Rate and Rhythm: Normal rate and regular rhythm.      Pulses: Normal pulses.      Heart sounds: Normal heart sounds.   Pulmonary:     "  Effort: Pulmonary effort is normal. No respiratory distress.      Breath sounds: Examination of the right-upper field reveals wheezing. Examination of the left-upper field reveals wheezing. Wheezing present.   Chest:      Chest wall: No tenderness.   Abdominal:      General: Abdomen is flat. Bowel sounds are normal.      Palpations: Abdomen is soft.      Tenderness: There is no abdominal tenderness.   Musculoskeletal:         General: Normal range of motion.      Cervical back: Normal range of motion and neck supple.      Right lower leg: No edema.      Left lower leg: No edema.   Skin:     General: Skin is warm and dry.   Neurological:      Mental Status: She is alert and oriented to person, place, and time. Mental status is at baseline.   Psychiatric:         Mood and Affect: Mood normal.         Behavior: Behavior normal.          Procedures:  Procedures      Medical Decision Making:      Comorbidities that affect care:    Asthma    External Notes reviewed:    None      The following orders were placed and all results were independently analyzed by me:  Orders Placed This Encounter   Procedures   • XR Chest 1 View   • [place] Draw   • Comprehensive Metabolic Panel   • BNP   • Troponin   • CBC Auto Differential   • Undress & Gown   • Continuous Pulse Oximetry   • Vital Signs   • Send pt home with nebulizer machine  Misc Nursing Order (Specify)   • ECG 12 Lead ED Triage Standing Order; SOA   • CBC & Differential   • Green Top (Gel)   • Lavender Top   • Gold Top - SST   • Light Blue Top       Medications Given in the Emergency Department:  Medications   ipratropium-albuterol (DUO-NEB) nebulizer solution 3 mL (3 mL Nebulization Given 1/17/23 0134)   dexamethasone sodium phosphate injection 10 mg (10 mg Intramuscular Given 1/17/23 0145)        ED Course:    ED Course as of 01/18/23 0445   Tue Jan 17, 2023   0240 XR Chest 1 View  No acute infiltrate [DS]      ED Course User Index  [DS] Zara Masters, APRN        Labs:    Lab Results (last 24 hours)     ** No results found for the last 24 hours. **           Imaging:    No Radiology Exams Resulted Within Past 24 Hours      Differential Diagnosis and Discussion:    Dyspnea: Differential diagnosis includes but is not limited to metabolic acidosis, neurological disorders, psychogenic, asthma, pneumothorax, upper airway obstruction, COPD, pneumonia, noncardiogenic pulmonary edema, interstitial lung disease, anemia, congestive heart failure, and pulmonary embolism    All labs were reviewed and analyzed by me.  All X-rays were independently reviewed by me.  EKG was interpreted by supervising attending.    MDM  Number of Diagnoses or Management Options  Mild intermittent asthma with exacerbation  Diagnosis management comments: The patient presents with shortness of breath consistent with their asthma exacerbations. The patient was monitored in the ED for 2 hours. The patient reports significant relief of their symptoms with ED treatment. The patient has no respiratory distress or hypoxia. This includes the absence of cervical, clavicular, and abdominal retractions; tachypnea and an oxygen saturation of less than 92% on room air.  The patient is able to speak in full sentences and is ambulatory without hypoxia on exertion. The patient's condition is stable and appropriate for discharge. The patient will be discharged with a prescription for bronchodilators and a steroid. The patient was advised to return for fever, respiratory distress, intractable vomiting, weakness, worsening shortness of breath, chest pain, or altered mental status. The patient will pursue further outpatient evaluation with the primary care physician. The patient has expressed a clear and thorough understanding and agreed to follow-up as instructed.       Amount and/or Complexity of Data Reviewed  Clinical lab tests: reviewed and ordered  Tests in the radiology section of CPT®: reviewed and ordered  Tests  in the medicine section of CPT®: reviewed and ordered    Risk of Complications, Morbidity, and/or Mortality  Presenting problems: moderate  Diagnostic procedures: low  Management options: low    Patient Progress  Patient progress: stable       Patient Care Considerations:    ANTIBIOTICS: I considered prescribing antibiotics as an outpatient however Patient has no signs of pneumonia or bacterial source      Consultants/Shared Management Plan:    None    Social Determinants of Health:    Patient is independent, reliable, and has access to care.       Disposition and Care Coordination:    Discharged: The patient is suitable and stable for discharge with no need for consideration of observation or admission.    I have explained the patient´s condition, diagnoses and treatment plan based on the information available to me at this time. I have answered questions and addressed any concerns. The patient has a good  understanding of the patient´s diagnosis, condition, and treatment plan as can be expected at this point. The vital signs have been stable. The patient´s condition is stable and appropriate for discharge from the emergency department.      The patient will pursue further outpatient evaluation with the primary care physician or other designated or consulting physician as outlined in the discharge instructions. They are agreeable to this plan of care and follow-up instructions have been explained in detail. The patient has received these instructions in written format and have expressed an understanding of the discharge instructions. The patient is aware that any significant change in condition or worsening of symptoms should prompt an immediate return to this or the closest emergency department or call to 911.    Final diagnoses:   Mild intermittent asthma with exacerbation        ED Disposition     ED Disposition   Discharge    Condition   Stable    Comment   --             This medical record created using voice  recognition software.           Zara Masters APRN  01/18/23 0449

## 2023-01-17 NOTE — DISCHARGE INSTRUCTIONS
Testing and chest x-ray were negative today.    Sounds like you had an exacerbation of your asthma.    Take medications as prescribed.  Use nebulizer as needed    Follow-up with PCP.    Return for new or worsening symptoms

## 2023-01-20 LAB — QT INTERVAL: 351 MS

## 2023-06-12 PROBLEM — J45.909 ASTHMA: Status: ACTIVE | Noted: 2023-06-12

## 2023-06-12 PROBLEM — F41.9 ANXIETY: Status: ACTIVE | Noted: 2023-06-12

## 2023-06-12 PROBLEM — Z34.80 SUPERVISION OF OTHER NORMAL PREGNANCY, ANTEPARTUM: Status: ACTIVE | Noted: 2023-06-12

## 2023-06-12 NOTE — PROGRESS NOTES
OB Initial Visit    CC- Here for care of current pregnancy, first visit  Chief Complaint   Patient presents with    Initial Prenatal Visit       Subjective:  22 y.o. presenting for her first obstetrical visit.    LMP: Patient's last menstrual period was 01/01/2023.  Only spotting that LMP.  Has not had any US this preg.  Moved from [place].     IGP,CtNgTv,rfx Aptima HPV ASCU (12/20/2021 14:59)  Hemoglobinopathy Fractionation Cascade (12/20/2021 14:13)    Pt has no complaints, is doing well    Reviewed and updated:  OBHx, GYNHx (STDs), PMHx, Medications, Allergies, PSHx, Social Hx, Preventative Hx (PAP), Vaccine Hx, Genetic Hx (pt, FOB, both families).        Objective:  /78   Wt 107 kg (235 lb)   LMP 01/01/2023   BMI 41.63 kg/m²   General- NAD, alert and oriented, appropriate  Psych- Normal mood, good memory  Neck- No masses, no thyroid enlargement  CV- Regular rhythm, no murmurs  Resp- CTA to bases, no wheezes  Abdomen- Soft, non distended, non tender, no masses    Breast left- No mass, non tender, no nipple discharge, no axillary or supraclavicular nodes palpable  Breast right- No mass, non tender, no nipple discharge, no axillary or supraclavicular nodes palpable    External genitalia- Normal, no lesions  Urethra- Normal, no masses, non tender  Vagina- Normal, no discharge  Bladder- Normal, no masses, non tender  Cervix- Normal, no lesions, no discharge, no CMT  Uterus- Bedside US NOT CONSISTENT with dates, appears larger than 23weeks.  -160     Adnexa- Normal, no mass, non tender    Lymphatic- No palpable neck, axillary, or groin nodes  Extremities- No edema, no cyanosis    Skin- No lesions, no rashes    Assessment and Plan:  Unknown   Diagnoses and all orders for this visit:    1. Supervision of other normal pregnancy, antepartum (Primary)  Overview:  STEVENSON finalized: Late PNC, await anatomy US (23+weeks)    Genetic testing (NIPS-Quad)/CF/AFP:  CF neg prior preg per pt, too late AFP.  Considering  NIPT 6/13/2023     COVID: Vaccinated.  Recommended bivalent booster.   Tdap:    Flu:    ? Desires Sterilization:    Anatomy US:  FU US:    PROBLEM LIST/PLAN:   Hx GHTN- baseline labs 6/13/2023   Asthma- inhaler prn  Obesity in preg- pre preg BMI 39  Patient low spectrum autism  Late PNC- UDS 6/13/2023   Hx of anxiety  Hx postpartum depression          Orders:  -     Chlamydia trachomatis, Neisseria gonorrhoeae, PCR - , Cervix  -     POC Urinalysis Dipstick  -     POC Pregnancy, Urine  -     OB Panel With HIV  -     Urine Culture - Urine, Urine, Clean Catch  -     Urine Drug Screen - Urine, Clean Catch    2. History of gestational hypertension  Overview:  G1 36 week IOL  G2 37 week IOL    Orders:  -     Comprehensive Metabolic Panel  -     Protein / Creatinine Ratio, Urine - Urine, Clean Catch    3. Fetal size inconsistent with dates  -     US Ob Detail Fetal Anatomy Single or First Gestation; Future        Counseling:   Nutrition discussed, calories, activity/exercise in pregnancy  Discussed dietary restrictions/safety food preparation in pregnancy  Reviewed what to expect prenatal visits, office providers and [facility] hospitalists  Appropriate trimester precautions provided, N/V, vag bleeding, cramping  Questions answered  VACCINE importance in pregnancy discussed.  Maternal and fetal risk of not being vaccinated reviewed NLT increased risk maternal/fetal severity of illness/death, PTD, CS, hemorrhage, HTN, possible IUFD.  Significant maternal and fetal/infant benefit w vaccination.  FDA approval and ACOG/SMFM/CDC strong recommendation in pregnancy.  Questions answered.     Return in about 2 weeks (around 6/27/2023) for FU OB w glucola, US in next 1-2 weeks.  Then OV q 2weeks x2.            Lucila Weiss, DO  06/13/2023    [facility]  [facility] [place] [address]  Dept: [phone]  Dept Fax: [phone]  Loc: [phone]  Loc Fax: [phone]

## 2023-06-13 ENCOUNTER — INITIAL PRENATAL (OUTPATIENT)
Dept: OBSTETRICS AND GYNECOLOGY | Facility: CLINIC | Age: 23
End: 2023-06-13
Payer: COMMERCIAL

## 2023-06-13 VITALS — DIASTOLIC BLOOD PRESSURE: 78 MMHG | WEIGHT: 235 LBS | SYSTOLIC BLOOD PRESSURE: 124 MMHG | BODY MASS INDEX: 41.63 KG/M2

## 2023-06-13 DIAGNOSIS — Z87.59 HISTORY OF GESTATIONAL HYPERTENSION: ICD-10-CM

## 2023-06-13 DIAGNOSIS — O26.849 FETAL SIZE INCONSISTENT WITH DATES: ICD-10-CM

## 2023-06-13 DIAGNOSIS — Z34.80 SUPERVISION OF OTHER NORMAL PREGNANCY, ANTEPARTUM: Primary | ICD-10-CM

## 2023-06-13 PROBLEM — O99.210 OBESITY IN PREGNANCY, ANTEPARTUM: Status: ACTIVE | Noted: 2023-06-13

## 2023-06-13 PROBLEM — O09.30 LATE PRENATAL CARE: Status: ACTIVE | Noted: 2023-06-13

## 2023-06-13 PROBLEM — F84.0 AUTISTIC DISORDER: Status: ACTIVE | Noted: 2023-06-13

## 2023-06-13 LAB
ABO GROUP BLD: NORMAL
ALBUMIN SERPL-MCNC: 3.9 G/DL (ref 3.5–5.2)
ALBUMIN/GLOB SERPL: 1.4 G/DL
ALP SERPL-CCNC: 87 U/L (ref 39–117)
ALT SERPL W P-5'-P-CCNC: 22 U/L (ref 1–33)
AMPHET+METHAMPHET UR QL: NEGATIVE
ANION GAP SERPL CALCULATED.3IONS-SCNC: 12.5 MMOL/L (ref 5–15)
AST SERPL-CCNC: 17 U/L (ref 1–32)
B-HCG UR QL: POSITIVE
BARBITURATES UR QL SCN: NEGATIVE
BASOPHILS # BLD AUTO: 0.02 10*3/MM3 (ref 0–0.2)
BASOPHILS NFR BLD AUTO: 0.3 % (ref 0–1.5)
BENZODIAZ UR QL SCN: NEGATIVE
BILIRUB SERPL-MCNC: <0.2 MG/DL (ref 0–1.2)
BLD GP AB SCN SERPL QL: NEGATIVE
BUN SERPL-MCNC: 5 MG/DL (ref 6–20)
BUN/CREAT SERPL: 10.2 (ref 7–25)
C TRACH RRNA CVX QL NAA+PROBE: NOT DETECTED
CALCIUM SPEC-SCNC: 9.5 MG/DL (ref 8.6–10.5)
CANNABINOIDS SERPL QL: NEGATIVE
CHLORIDE SERPL-SCNC: 105 MMOL/L (ref 98–107)
CO2 SERPL-SCNC: 21.5 MMOL/L (ref 22–29)
COCAINE UR QL: NEGATIVE
CREAT SERPL-MCNC: 0.49 MG/DL (ref 0.57–1)
CREAT UR-MCNC: 155.6 MG/DL
DEPRECATED RDW RBC AUTO: 44.6 FL (ref 37–54)
EGFRCR SERPLBLD CKD-EPI 2021: 135.2 ML/MIN/1.73
EOSINOPHIL # BLD AUTO: 0.27 10*3/MM3 (ref 0–0.4)
EOSINOPHIL NFR BLD AUTO: 4 % (ref 0.3–6.2)
ERYTHROCYTE [DISTWIDTH] IN BLOOD BY AUTOMATED COUNT: 14.1 % (ref 12.3–15.4)
EXPIRATION DATE: ABNORMAL
FENTANYL UR-MCNC: NEGATIVE NG/ML
GLOBULIN UR ELPH-MCNC: 2.8 GM/DL
GLUCOSE SERPL-MCNC: 126 MG/DL (ref 65–99)
GLUCOSE UR STRIP-MCNC: NEGATIVE MG/DL
HBV SURFACE AG SERPL QL IA: NORMAL
HCT VFR BLD AUTO: 38.1 % (ref 34–46.6)
HCV AB SER DONR QL: NORMAL
HGB BLD-MCNC: 13 G/DL (ref 12–15.9)
HIV1+2 AB SER QL: NORMAL
IMM GRANULOCYTES # BLD AUTO: 0.02 10*3/MM3 (ref 0–0.05)
IMM GRANULOCYTES NFR BLD AUTO: 0.3 % (ref 0–0.5)
INTERNAL NEGATIVE CONTROL: ABNORMAL
INTERNAL POSITIVE CONTROL: ABNORMAL
LYMPHOCYTES # BLD AUTO: 0.96 10*3/MM3 (ref 0.7–3.1)
LYMPHOCYTES NFR BLD AUTO: 14.3 % (ref 19.6–45.3)
Lab: ABNORMAL
MCH RBC QN AUTO: 30.2 PG (ref 26.6–33)
MCHC RBC AUTO-ENTMCNC: 34.1 G/DL (ref 31.5–35.7)
MCV RBC AUTO: 88.4 FL (ref 79–97)
METHADONE UR QL SCN: NEGATIVE
MONOCYTES # BLD AUTO: 0.38 10*3/MM3 (ref 0.1–0.9)
MONOCYTES NFR BLD AUTO: 5.7 % (ref 5–12)
N GONORRHOEA RRNA SPEC QL NAA+PROBE: NOT DETECTED
NEUTROPHILS NFR BLD AUTO: 5.04 10*3/MM3 (ref 1.7–7)
NEUTROPHILS NFR BLD AUTO: 75.4 % (ref 42.7–76)
NRBC BLD AUTO-RTO: 0 /100 WBC (ref 0–0.2)
OPIATES UR QL: NEGATIVE
OXYCODONE UR QL SCN: NEGATIVE
PLATELET # BLD AUTO: 225 10*3/MM3 (ref 140–450)
PMV BLD AUTO: 11.8 FL (ref 6–12)
POTASSIUM SERPL-SCNC: 3.5 MMOL/L (ref 3.5–5.2)
PROT ?TM UR-MCNC: 12.9 MG/DL
PROT SERPL-MCNC: 6.7 G/DL (ref 6–8.5)
PROT UR STRIP-MCNC: NEGATIVE MG/DL
PROT/CREAT UR: 0.08 MG/G{CREAT}
RBC # BLD AUTO: 4.31 10*6/MM3 (ref 3.77–5.28)
RH BLD: POSITIVE
SODIUM SERPL-SCNC: 139 MMOL/L (ref 136–145)
T PALLIDUM IGG SER QL: NORMAL
WBC NRBC COR # BLD: 6.69 10*3/MM3 (ref 3.4–10.8)

## 2023-06-13 PROCEDURE — 80307 DRUG TEST PRSMV CHEM ANLYZR: CPT | Performed by: OBSTETRICS & GYNECOLOGY

## 2023-06-13 PROCEDURE — 86900 BLOOD TYPING SEROLOGIC ABO: CPT | Performed by: OBSTETRICS & GYNECOLOGY

## 2023-06-13 PROCEDURE — 82570 ASSAY OF URINE CREATININE: CPT | Performed by: OBSTETRICS & GYNECOLOGY

## 2023-06-13 PROCEDURE — 80053 COMPREHEN METABOLIC PANEL: CPT | Performed by: OBSTETRICS & GYNECOLOGY

## 2023-06-13 PROCEDURE — 85025 COMPLETE CBC W/AUTO DIFF WBC: CPT | Performed by: OBSTETRICS & GYNECOLOGY

## 2023-06-13 PROCEDURE — G0432 EIA HIV-1/HIV-2 SCREEN: HCPCS | Performed by: OBSTETRICS & GYNECOLOGY

## 2023-06-13 PROCEDURE — 86803 HEPATITIS C AB TEST: CPT | Performed by: OBSTETRICS & GYNECOLOGY

## 2023-06-13 PROCEDURE — 86780 TREPONEMA PALLIDUM: CPT | Performed by: OBSTETRICS & GYNECOLOGY

## 2023-06-13 PROCEDURE — 84156 ASSAY OF PROTEIN URINE: CPT | Performed by: OBSTETRICS & GYNECOLOGY

## 2023-06-13 PROCEDURE — 86762 RUBELLA ANTIBODY: CPT | Performed by: OBSTETRICS & GYNECOLOGY

## 2023-06-13 PROCEDURE — 87491 CHLMYD TRACH DNA AMP PROBE: CPT | Performed by: OBSTETRICS & GYNECOLOGY

## 2023-06-13 PROCEDURE — 86901 BLOOD TYPING SEROLOGIC RH(D): CPT | Performed by: OBSTETRICS & GYNECOLOGY

## 2023-06-13 PROCEDURE — 87340 HEPATITIS B SURFACE AG IA: CPT | Performed by: OBSTETRICS & GYNECOLOGY

## 2023-06-13 PROCEDURE — 86850 RBC ANTIBODY SCREEN: CPT | Performed by: OBSTETRICS & GYNECOLOGY

## 2023-06-13 PROCEDURE — 87086 URINE CULTURE/COLONY COUNT: CPT | Performed by: OBSTETRICS & GYNECOLOGY

## 2023-06-13 PROCEDURE — 87591 N.GONORRHOEAE DNA AMP PROB: CPT | Performed by: OBSTETRICS & GYNECOLOGY

## 2023-06-13 RX ORDER — PRENATAL VIT/IRON FUM/FOLIC AC 27MG-0.8MG
TABLET ORAL DAILY
COMMUNITY

## 2023-06-14 LAB
BACTERIA SPEC AEROBE CULT: NO GROWTH
RUBV IGG SERPL IA-ACNC: 1.04 INDEX

## 2023-06-16 ENCOUNTER — HOSPITAL ENCOUNTER (EMERGENCY)
Facility: HOSPITAL | Age: 23
Discharge: LEFT BEFORE DISCHARGE | End: 2023-06-16
Payer: COMMERCIAL

## 2023-06-16 ENCOUNTER — APPOINTMENT (OUTPATIENT)
Dept: GENERAL RADIOLOGY | Facility: HOSPITAL | Age: 23
End: 2023-06-16
Payer: COMMERCIAL

## 2023-06-16 VITALS
WEIGHT: 238.32 LBS | HEART RATE: 85 BPM | SYSTOLIC BLOOD PRESSURE: 133 MMHG | DIASTOLIC BLOOD PRESSURE: 70 MMHG | TEMPERATURE: 98.2 F | BODY MASS INDEX: 42.23 KG/M2 | HEIGHT: 63 IN | OXYGEN SATURATION: 99 % | RESPIRATION RATE: 16 BRPM

## 2023-06-16 DIAGNOSIS — Z53.21 ELOPED FROM EMERGENCY DEPARTMENT: Primary | ICD-10-CM
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
## 2023-06-29 PROBLEM — O26.849 FETAL SIZE INCONSISTENT WITH DATES: Status: RESOLVED | Noted: 2023-06-13 | Resolved: 2023-06-29

## 2023-06-29 PROBLEM — J45.20 MILD INTERMITTENT ASTHMA WITHOUT COMPLICATION: Status: ACTIVE | Noted: 2023-06-29

## 2023-07-30 ENCOUNTER — HOSPITAL ENCOUNTER (INPATIENT)
Facility: HOSPITAL | Age: 23
LOS: 1 days | Discharge: HOME | DRG: 923 | End: 2023-07-31
Attending: STUDENT IN AN ORGANIZED HEALTH CARE EDUCATION/TRAINING PROGRAM | Admitting: STUDENT IN AN ORGANIZED HEALTH CARE EDUCATION/TRAINING PROGRAM
Payer: COMMERCIAL

## 2023-07-30 ENCOUNTER — APPOINTMENT (OUTPATIENT)
Dept: GENERAL RADIOLOGY | Facility: HOSPITAL | Age: 23
DRG: 923 | End: 2023-07-30
Payer: COMMERCIAL

## 2023-07-30 DIAGNOSIS — S93.402A SPRAIN OF LEFT ANKLE, UNSPECIFIED LIGAMENT, INITIAL ENCOUNTER: Primary | ICD-10-CM
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
## 2023-07-30 LAB
ABO GROUP BLD: NORMAL
BLD GP AB SCN SERPL QL: NEGATIVE
DEPRECATED RDW RBC AUTO: 48.8 FL (ref 37–54)
ERYTHROCYTE [DISTWIDTH] IN BLOOD BY AUTOMATED COUNT: 14.8 % (ref 12.3–15.4)
HCT VFR BLD AUTO: 36.5 % (ref 34–46.6)
HGB BLD-MCNC: 12 G/DL (ref 12–15.9)
HGB F BLD QL KLEIH BETKE: NORMAL
MCH RBC QN AUTO: 29.6 PG (ref 26.6–33)
MCHC RBC AUTO-ENTMCNC: 32.9 G/DL (ref 31.5–35.7)
MCV RBC AUTO: 89.9 FL (ref 79–97)
PLATELET # BLD AUTO: 213 10*3/MM3 (ref 140–450)
PMV BLD AUTO: 11.8 FL (ref 6–12)
RBC # BLD AUTO: 4.06 10*6/MM3 (ref 3.77–5.28)
RH BLD: POSITIVE
T&S EXPIRATION DATE: NORMAL
WBC NRBC COR # BLD: 8.49 10*3/MM3 (ref 3.4–10.8)

## 2023-07-30 PROCEDURE — 86850 RBC ANTIBODY SCREEN: CPT | Performed by: STUDENT IN AN ORGANIZED HEALTH CARE EDUCATION/TRAINING PROGRAM

## 2023-07-30 PROCEDURE — 86901 BLOOD TYPING SEROLOGIC RH(D): CPT | Performed by: STUDENT IN AN ORGANIZED HEALTH CARE EDUCATION/TRAINING PROGRAM

## 2023-07-30 PROCEDURE — 85027 COMPLETE CBC AUTOMATED: CPT | Performed by: STUDENT IN AN ORGANIZED HEALTH CARE EDUCATION/TRAINING PROGRAM

## 2023-07-30 PROCEDURE — 73610 X-RAY EXAM OF ANKLE: CPT

## 2023-07-30 PROCEDURE — 85460 HEMOGLOBIN FETAL: CPT | Performed by: STUDENT IN AN ORGANIZED HEALTH CARE EDUCATION/TRAINING PROGRAM

## 2023-07-30 PROCEDURE — 96360 HYDRATION IV INFUSION INIT: CPT

## 2023-07-30 PROCEDURE — G0463 HOSPITAL OUTPT CLINIC VISIT: HCPCS

## 2023-07-30 PROCEDURE — 86900 BLOOD TYPING SEROLOGIC ABO: CPT | Performed by: STUDENT IN AN ORGANIZED HEALTH CARE EDUCATION/TRAINING PROGRAM

## 2023-07-30 PROCEDURE — 96361 HYDRATE IV INFUSION ADD-ON: CPT

## 2023-07-30 RX ORDER — ACETAMINOPHEN 325 MG/1
650 TABLET ORAL EVERY 6 HOURS PRN
Status: DISCONTINUED | OUTPATIENT
Start: 2023-07-31 | End: 2023-07-31 | Stop reason: HOSPADM

## 2023-07-30 RX ORDER — CYCLOBENZAPRINE HCL 10 MG
10 TABLET ORAL ONCE
Status: COMPLETED | OUTPATIENT
Start: 2023-07-30 | End: 2023-07-30

## 2023-07-30 RX ORDER — CYCLOBENZAPRINE HCL 5 MG
5 TABLET ORAL 3 TIMES DAILY PRN
Status: DISCONTINUED | OUTPATIENT
Start: 2023-07-31 | End: 2023-07-31 | Stop reason: HOSPADM

## 2023-07-30 RX ORDER — SODIUM CHLORIDE, SODIUM LACTATE, POTASSIUM CHLORIDE, CALCIUM CHLORIDE 600; 310; 30; 20 MG/100ML; MG/100ML; MG/100ML; MG/100ML
125 INJECTION, SOLUTION INTRAVENOUS CONTINUOUS
Status: DISCONTINUED | OUTPATIENT
Start: 2023-07-30 | End: 2023-07-31

## 2023-07-30 RX ORDER — ACETAMINOPHEN 500 MG
1000 TABLET ORAL ONCE
Status: COMPLETED | OUTPATIENT
Start: 2023-07-30 | End: 2023-07-30

## 2023-07-30 RX ADMIN — CYCLOBENZAPRINE HYDROCHLORIDE 10 MG: 10 TABLET, FILM COATED ORAL at 19:17

## 2023-07-30 RX ADMIN — SODIUM CHLORIDE, POTASSIUM CHLORIDE, SODIUM LACTATE AND CALCIUM CHLORIDE 125 ML/HR: 600; 310; 30; 20 INJECTION, SOLUTION INTRAVENOUS at 18:42

## 2023-07-30 RX ADMIN — ACETAMINOPHEN 1000 MG: 500 TABLET ORAL at 18:42

## 2023-07-30 NOTE — OBED NOTES
MILY Bull  Obstetric History and Physical    Chief Complaint   Patient presents with    Fall       Subjective     HPI:    Patient is a 22 y.o. female  currently at 30w0d, who presents to triage after a fall. She was outside and her toddler tripped her on accident. She fell down two stairs and then hit her abdomen. She has no contractions or vaginal bleeding or cramping. Her left ankle is tender. She was brought by EMS to triage.       - Good fetal movement. Denies vaginal bleeding, gush of fluid, fevers, chills, SOA, CP, N/V/D, headaches, RUQ pain, blurry vision.       The following portions of the patients history were reviewed and updated as appropriate:   current medications, allergies, past medical history, past surgical history, past family history, past social history and current problem list.     Prenatal Information:  Prenatal Results       Initial Prenatal Labs       Test Value Reference Range Date Time    Hemoglobin ^ 13.6 g/dL 12.0 - 16.0 23 1323       13.1 g/dL 12.0 - 15.9 23 0111    Hematocrit ^ 42.6 % 36.0 - 46.0 23 1323       40.0 % 34.0 - 46.6 23 0111    Platelets ^ 232 10*3/uL 140 - 440 23 1323       304 10*3/mm3 140 - 450 23 0111    Rubella IgG  1.04 index Immune >0.99 23 1041    Hepatitis B SAg  Non-Reactive  Non-Reactive 23 1041    Hepatitis C Ab  Non-Reactive  Non-Reactive 23 1041    RPR  Non-Reactive  Non-Reactive 21 1413    T. Pallidum Ab   Non-Reactive  Non-Reactive 23 1041    ABO  O   23 1041    Rh  Positive   23 1041    Antibody Screen        HIV  Non-Reactive  Non-Reactive 23 1041    Urine Culture  No growth   23 0947    Gonorrhea  Not Detected  Not Detected  23 0947    Chlamydia  Not Detected  Not Detected  23 0947    TSH  1.800 uIU/mL 0.270 - 4.200 21 1413    HgB A1c         Varicella IgG        HgB Electrophoresis         Cystic fibrosis                   Fetal testing         Test Value Reference Range Date Time    NIPT        MSAFP        AFP-4                  2nd and 3rd Trimester       Test Value Reference Range Date Time    Hemoglobin (repeated)  12.5 g/dL 12.0 - 15.9 06/29/23 1153       13.0 g/dL 12.0 - 15.9 06/13/23 1041    Hematocrit (repeated)  37.8 % 34.0 - 46.6 06/29/23 1153       38.1 % 34.0 - 46.6 06/13/23 1041    Platelets   211 10*3/mm3 140 - 450 06/29/23 1153       225 10*3/mm3 140 - 450 06/13/23 1041      ^ 232 10*3/uL 140 - 440 03/23/23 1323       304 10*3/mm3 140 - 450 01/17/23 0111    GCT  126 mg/dL 65 - 139 06/29/23 1153    Antibody Screen (repeated)  Negative   06/13/23 1041    GTT Fasting        GTT 1 Hr        GTT 2 Hr        GTT 3 Hr        Group B Strep                  Other testing        Test Value Reference Range Date Time    Parvo IgG         CMV IgG                   Drug Screening       Test Value Reference Range Date Time    Amphetamine Screen        Barbiturate Screen  Negative  Negative 06/13/23 0947    Benzodiazepine Screen  Negative  Negative 06/13/23 0947    Methadone Screen  Negative  Negative 06/13/23 0947    Phencyclidine Screen        Opiates Screen  Negative  Negative 06/13/23 0947    THC Screen  Negative  Negative 06/13/23 0947    Cocaine Screen  Negative  Negative 06/13/23 0947    Propoxyphene Screen        Buprenorphine Screen        Methamphetamine Screen        Oxycodone Screen  Negative  Negative 06/13/23 0947    Tricyclic Antidepressants Screen                  Legend    ^: Historical                          External Prenatal Results       Pregnancy Outside Results - Transcribed From Office Records - See Scanned Records For Details       Test Value Date Time    ABO  O  06/13/23 1041    Rh  Positive  06/13/23 1041    Antibody Screen  Negative  06/13/23 1041    Varicella IgG  465 index 10/08/21 1102    Rubella  1.04 index 06/13/23 1041    Hgb  12.5 g/dL 06/29/23 1153       13.0 g/dL 06/13/23 1041      ^ 13.6 g/dL 03/23/23 1323        13.1 g/dL 23 0111    Hct  37.8 % 23 1153       38.1 % 23 1041      ^ 42.6 % 23 1323       40.0 % 23 0111    Glucose Fasting GTT       Glucose Tolerance Test 1 hour       Glucose Tolerance Test 3 hour       Gonorrhea (discrete)  Not Detected  23 0947    Chlamydia (discrete)  Not Detected  23 0947    RPR  Non-Reactive  21 1413    VDRL       Syphilis Antibody       HBsAg  Non-Reactive  23 1041    Herpes Simplex Virus PCR       Herpes Simplex VIrus Culture       HIV  Non-Reactive  23 1041    Hep C RNA Quant PCR       Hep C Antibody  Non-Reactive  23 1041    AFP       Group B Strep  No Group B Streptococcus isolated  06/15/22 1153    GBS Susceptibility to Clindamycin       GBS Susceptibility to Erythromycin       Fetal Fibronectin       Genetic Testing, Maternal Blood                 Drug Screening       Test Value Date Time    Urine Drug Screen       Amphetamine Screen       Barbiturate Screen  Negative  23 0947    Benzodiazepine Screen  Negative  23 0947    Methadone Screen  Negative  23 0947    Phencyclidine Screen       Opiates Screen  Negative  23 0947    THC Screen  Negative  23 0947    Cocaine Screen       Propoxyphene Screen       Buprenorphine Screen       Methamphetamine Screen       Oxycodone Screen  Negative  23 0947    Tricyclic Antidepressants Screen                 Legend    ^: Historical                             Past OB History:     OB History    Para Term  AB Living   3 2 2 0 0 2   SAB IAB Ectopic Molar Multiple Live Births   0 0 0 0 0 2      # Outcome Date GA Lbr Brandon/2nd Weight Sex Delivery Anes PTL Lv   3 Current            2 Term 22 39w4d / 00:15 3320 g (7 lb 5.1 oz) M Vag-Spont EPI N HERLINDA      Name: AMRIT TODD      Apgar1: 8  Apgar5: 9   1 Term 21 37w3d  3289 g (7 lb 4 oz) M Vag-Spont EPI  HERLINDA       Past Medical History: Past Medical History:   Diagnosis Date     Anxiety     Asthma     SEASONAL    Other microscopic hematuria 04/11/2022 4/11/2022 r sided flank pain, acute onset, urine dip moderate blood, straight cath 0-2 RBC; no WBC. Renal U/S unremarkable ; suspect small stone vs. Bladder irritation in pregnancy; strain urine. Tylenol as needed.     Postpartum depression 08/23/2022    Second degree perineal laceration during delivery 07/07/2022      Past Surgical History Past Surgical History:   Procedure Laterality Date    WISDOM TOOTH EXTRACTION        Family History: Family History   Problem Relation Age of Onset    Diabetes Mother     Diabetes Maternal Grandmother     Cancer Maternal Grandfather         Eye cancer    Diabetes Maternal Grandfather     Diabetes Maternal Uncle     Breast cancer Neg Hx     Ovarian cancer Neg Hx     Uterine cancer Neg Hx     Colon cancer Neg Hx     Pulmonary embolism Neg Hx     Deep vein thrombosis Neg Hx       Social History:  reports that she has never smoked. She has never used smokeless tobacco.   reports that she does not currently use alcohol.   reports no history of drug use.        General ROS: Pertinent items are noted in HPI  Home Medications:  albuterol, albuterol sulfate HFA, aspirin, metoclopramide, and prenatal vitamin 27-0.8    Allergies:  Allergies   Allergen Reactions    Lavender Oil Shortness Of Breath       Objective       Vital Signs Range for the last 24 hours  Temperature: Temp:  [98.7 °F (37.1 °C)] 98.7 °F (37.1 °C)   Temp Source: Temp src: Oral   BP: BP: (113)/(58) 113/58   Pulse: Heart Rate:  [] 102   Respirations: Resp:  [20] 20   SPO2: SpO2:  [100 %] 100 %     Physical Examination:   General appearance - alert, well appearing, and in no distress  Mental status - alert, oriented to person, place, and time  Chest - non labored respirations  Abdomen - soft, nontender, nondistended, no bruising  Back exam - full range of motion, no tenderness or pain on motion  Neurological - alert, oriented, normal  speech  Extremities - peripheral pulses normal, left ankle tender and good ROM  Skin - normal coloration and turgor, no suspicious skin lesions noted      Bedside ultrasound: vertex, BPP 8/8, no signs of placental abruption.   Spec exam: declined.     Fetal heart tones:   140 bpm / moderate variability / accelerations present / variable  decelerations     Tocometer  Contractions frequent then spaced out to none        Assessment & Plan     Assessment:  -   Intrauterine pregnancy at 30w0d gestation with reactive fetal status.    -   Fall   - KB test neg   - RH pos, does not need rhogam   - contractions spaced out to none   - no signs of placental abruption on ultrasound     - ankle pain    - ER consulted for ankle evaluation     - s/p xray     Plan:    Fall   - continuous monitoring for 24 hours, end will be at 530 pm on    - npo in case of emergent  section     Ankle pain   - Tylenol and Flexeril PRN     Routine  - Admit to L&D to obs  - NPO   - T&S and CBC ordered  - SCDs  - IV fluids 125 cc/hr       Dispo  - admit to obs     Electronically signed by Aram Qureshi MD, 23, 6:11 PM EDT.

## 2023-07-31 VITALS
RESPIRATION RATE: 16 BRPM | BODY MASS INDEX: 41.29 KG/M2 | HEART RATE: 96 BPM | HEIGHT: 63 IN | WEIGHT: 233 LBS | SYSTOLIC BLOOD PRESSURE: 135 MMHG | DIASTOLIC BLOOD PRESSURE: 80 MMHG | OXYGEN SATURATION: 100 % | TEMPERATURE: 98.2 F

## 2023-07-31 PROCEDURE — 96361 HYDRATE IV INFUSION ADD-ON: CPT

## 2023-07-31 RX ADMIN — SODIUM CHLORIDE, POTASSIUM CHLORIDE, SODIUM LACTATE AND CALCIUM CHLORIDE 125 ML/HR: 600; 310; 30; 20 INJECTION, SOLUTION INTRAVENOUS at 01:48

## 2023-07-31 RX ADMIN — ACETAMINOPHEN 650 MG: 325 TABLET ORAL at 01:15

## 2023-07-31 RX ADMIN — CYCLOBENZAPRINE 5 MG: 10 TABLET, FILM COATED ORAL at 01:15

## 2023-07-31 NOTE — NURSING NOTE
"Pt notified that Dr. Medina is seeing a pt in the ER and is unable to come to unit at this time. Pt states that she has to leave to  her kids as the sitter has to go into work. She states \" I stayed until 4pm like I was asked to and now I have to leave.\" Pt understands that she will have to sign out AMA. Explained that without Dr. Medina assessing pt, she is signing out with risk to unborn child. Pt understands and signed AMA form. Dr. Medina notified. Gave written instructions and discussed kick counts and labor precautions. Pt verbalized understanding.   "

## 2023-07-31 NOTE — PLAN OF CARE
Problem: Adult Inpatient Plan of Care  Goal: Plan of Care Review  Outcome: Ongoing, Progressing  Goal: Patient-Specific Goal (Individualized)  Outcome: Ongoing, Progressing  Goal: Absence of Hospital-Acquired Illness or Injury  Outcome: Ongoing, Progressing  Intervention: Prevent and Manage VTE (Venous Thromboembolism) Risk  Recent Flowsheet Documentation  Taken 7/30/2023 2200 by Annette Muñoz RN  VTE Prevention/Management:   bilateral   sequential compression devices on  Taken 7/30/2023 2100 by Annette Muñoz RN  VTE Prevention/Management:   bilateral   compression stockings on  Goal: Optimal Comfort and Wellbeing  Outcome: Ongoing, Progressing  Intervention: Monitor Pain and Promote Comfort  Recent Flowsheet Documentation  Taken 7/30/2023 2200 by Annette Muñoz RN  Pain Management Interventions: (Refused Ice, no medications due at this time) --  Goal: Readiness for Transition of Care  Outcome: Ongoing, Progressing  Intervention: Mutually Develop Transition Plan  Recent Flowsheet Documentation  Taken 7/30/2023 2148 by Annette Muñoz RN  Transportation Anticipated: car, drives self  Patient/Family Anticipated Services at Transition: (Cam Boot already obtained and at bedside for patient to use at discharge.)   durable medical equipment   other (see comments)  Patient/Family Anticipates Transition to: home  Taken 7/30/2023 2146 by Annette Muñoz, RN  Equipment Currently Used at Home: none     Problem: Fall Injury Risk  Goal: Absence of Fall and Fall-Related Injury  Outcome: Ongoing, Progressing     Problem: Maternal-Fetal Wellbeing  Goal: Optimal Maternal-Fetal Wellbeing  Outcome: Ongoing, Progressing     Problem: Pain Acute  Goal: Acceptable Pain Control and Functional Ability  Outcome: Ongoing, Progressing  Intervention: Develop Pain Management Plan  Recent Flowsheet Documentation  Taken 7/30/2023 2200 by Annette Muñoz RN  Pain Management Interventions: (Refused Ice, no  medications due at this time) --   Goal Outcome Evaluation:

## 2023-07-31 NOTE — H&P
MILY Bull  Obstetric History and Physical    Chief Complaint   Patient presents with    Fall       Subjective     HPI:    Patient is a 22 y.o. female  currently at 30w0d, who presents to triage after a fall. She was outside and her toddler tripped her on accident. She fell down two stairs and then hit her abdomen. She has no contractions or vaginal bleeding or cramping. Her left ankle is tender. She was brought by EMS to triage.       - Good fetal movement. Denies vaginal bleeding, gush of fluid, fevers, chills, SOA, CP, N/V/D, headaches, RUQ pain, blurry vision.       The following portions of the patients history were reviewed and updated as appropriate:   current medications, allergies, past medical history, past surgical history, past family history, past social history and current problem list.     Prenatal Information:  Prenatal Results       Initial Prenatal Labs       Test Value Reference Range Date Time    Hemoglobin ^ 13.6 g/dL 12.0 - 16.0 23 1323       13.1 g/dL 12.0 - 15.9 23 0111    Hematocrit ^ 42.6 % 36.0 - 46.0 23 1323       40.0 % 34.0 - 46.6 23 0111    Platelets ^ 232 10*3/uL 140 - 440 23 1323       304 10*3/mm3 140 - 450 23 0111    Rubella IgG  1.04 index Immune >0.99 23 1041    Hepatitis B SAg  Non-Reactive  Non-Reactive 23 1041    Hepatitis C Ab  Non-Reactive  Non-Reactive 23 1041    RPR  Non-Reactive  Non-Reactive 21 1413    T. Pallidum Ab   Non-Reactive  Non-Reactive 23 1041    ABO  O   23 1041    Rh  Positive   23 1041    Antibody Screen        HIV  Non-Reactive  Non-Reactive 23 1041    Urine Culture  No growth   23 0947    Gonorrhea  Not Detected  Not Detected  23 0947    Chlamydia  Not Detected  Not Detected  23 0947    TSH  1.800 uIU/mL 0.270 - 4.200 21 1413    HgB A1c         Varicella IgG        HgB Electrophoresis         Cystic fibrosis                   Fetal testing         Test Value Reference Range Date Time    NIPT        MSAFP        AFP-4                  2nd and 3rd Trimester       Test Value Reference Range Date Time    Hemoglobin (repeated)  12.5 g/dL 12.0 - 15.9 06/29/23 1153       13.0 g/dL 12.0 - 15.9 06/13/23 1041    Hematocrit (repeated)  37.8 % 34.0 - 46.6 06/29/23 1153       38.1 % 34.0 - 46.6 06/13/23 1041    Platelets   211 10*3/mm3 140 - 450 06/29/23 1153       225 10*3/mm3 140 - 450 06/13/23 1041      ^ 232 10*3/uL 140 - 440 03/23/23 1323       304 10*3/mm3 140 - 450 01/17/23 0111    GCT  126 mg/dL 65 - 139 06/29/23 1153    Antibody Screen (repeated)  Negative   06/13/23 1041    GTT Fasting        GTT 1 Hr        GTT 2 Hr        GTT 3 Hr        Group B Strep                  Other testing        Test Value Reference Range Date Time    Parvo IgG         CMV IgG                   Drug Screening       Test Value Reference Range Date Time    Amphetamine Screen        Barbiturate Screen  Negative  Negative 06/13/23 0947    Benzodiazepine Screen  Negative  Negative 06/13/23 0947    Methadone Screen  Negative  Negative 06/13/23 0947    Phencyclidine Screen        Opiates Screen  Negative  Negative 06/13/23 0947    THC Screen  Negative  Negative 06/13/23 0947    Cocaine Screen  Negative  Negative 06/13/23 0947    Propoxyphene Screen        Buprenorphine Screen        Methamphetamine Screen        Oxycodone Screen  Negative  Negative 06/13/23 0947    Tricyclic Antidepressants Screen                  Legend    ^: Historical                          External Prenatal Results       Pregnancy Outside Results - Transcribed From Office Records - See Scanned Records For Details       Test Value Date Time    ABO  O  06/13/23 1041    Rh  Positive  06/13/23 1041    Antibody Screen  Negative  06/13/23 1041    Varicella IgG  465 index 10/08/21 1102    Rubella  1.04 index 06/13/23 1041    Hgb  12.5 g/dL 06/29/23 1153       13.0 g/dL 06/13/23 1041      ^ 13.6 g/dL 03/23/23 1323        13.1 g/dL 23 0111    Hct  37.8 % 23 1153       38.1 % 23 1041      ^ 42.6 % 23 1323       40.0 % 23 0111    Glucose Fasting GTT       Glucose Tolerance Test 1 hour       Glucose Tolerance Test 3 hour       Gonorrhea (discrete)  Not Detected  23 0947    Chlamydia (discrete)  Not Detected  23 0947    RPR  Non-Reactive  21 1413    VDRL       Syphilis Antibody       HBsAg  Non-Reactive  23 1041    Herpes Simplex Virus PCR       Herpes Simplex VIrus Culture       HIV  Non-Reactive  23 1041    Hep C RNA Quant PCR       Hep C Antibody  Non-Reactive  23 1041    AFP       Group B Strep  No Group B Streptococcus isolated  06/15/22 1153    GBS Susceptibility to Clindamycin       GBS Susceptibility to Erythromycin       Fetal Fibronectin       Genetic Testing, Maternal Blood                 Drug Screening       Test Value Date Time    Urine Drug Screen       Amphetamine Screen       Barbiturate Screen  Negative  23 0947    Benzodiazepine Screen  Negative  23 0947    Methadone Screen  Negative  23 0947    Phencyclidine Screen       Opiates Screen  Negative  23 0947    THC Screen  Negative  23 0947    Cocaine Screen       Propoxyphene Screen       Buprenorphine Screen       Methamphetamine Screen       Oxycodone Screen  Negative  23 0947    Tricyclic Antidepressants Screen                 Legend    ^: Historical                             Past OB History:     OB History    Para Term  AB Living   3 2 2 0 0 2   SAB IAB Ectopic Molar Multiple Live Births   0 0 0 0 0 2      # Outcome Date GA Lbr Brandon/2nd Weight Sex Delivery Anes PTL Lv   3 Current            2 Term 22 39w4d / 00:15 3320 g (7 lb 5.1 oz) M Vag-Spont EPI N HERLINDA      Name: AMRIT TODD      Apgar1: 8  Apgar5: 9   1 Term 21 37w3d  3289 g (7 lb 4 oz) M Vag-Spont EPI  HERLINDA       Past Medical History: Past Medical History:   Diagnosis Date     Anxiety     Asthma     SEASONAL    Other microscopic hematuria 04/11/2022 4/11/2022 r sided flank pain, acute onset, urine dip moderate blood, straight cath 0-2 RBC; no WBC. Renal U/S unremarkable ; suspect small stone vs. Bladder irritation in pregnancy; strain urine. Tylenol as needed.     Postpartum depression 08/23/2022    Second degree perineal laceration during delivery 07/07/2022      Past Surgical History Past Surgical History:   Procedure Laterality Date    WISDOM TOOTH EXTRACTION        Family History: Family History   Problem Relation Age of Onset    Diabetes Mother     Diabetes Maternal Grandmother     Cancer Maternal Grandfather         Eye cancer    Diabetes Maternal Grandfather     Diabetes Maternal Uncle     Breast cancer Neg Hx     Ovarian cancer Neg Hx     Uterine cancer Neg Hx     Colon cancer Neg Hx     Pulmonary embolism Neg Hx     Deep vein thrombosis Neg Hx       Social History:  reports that she has never smoked. She has never used smokeless tobacco.   reports that she does not currently use alcohol.   reports no history of drug use.        General ROS: Pertinent items are noted in HPI  Home Medications:  albuterol, albuterol sulfate HFA, aspirin, metoclopramide, and prenatal vitamin 27-0.8    Allergies:  Allergies   Allergen Reactions    Lavender Oil Shortness Of Breath       Objective       Vital Signs Range for the last 24 hours  Temperature: Temp:  [98.7 °F (37.1 °C)] 98.7 °F (37.1 °C)   Temp Source: Temp src: Oral   BP: BP: (113)/(58) 113/58   Pulse: Heart Rate:  [] 102   Respirations: Resp:  [20] 20   SPO2: SpO2:  [100 %] 100 %     Physical Examination:   General appearance - alert, well appearing, and in no distress  Mental status - alert, oriented to person, place, and time  Chest - non labored respirations  Abdomen - soft, nontender, nondistended, no bruising  Back exam - full range of motion, no tenderness or pain on motion  Neurological - alert, oriented, normal  speech  Extremities - peripheral pulses normal, left ankle tender and good ROM  Skin - normal coloration and turgor, no suspicious skin lesions noted      Bedside ultrasound: vertex, BPP 8/8, no signs of placental abruption.   Spec exam: declined.     Fetal heart tones:   140 bpm / moderate variability / accelerations present / variable  decelerations     Tocometer  Contractions frequent then spaced out to none        Assessment & Plan     Assessment:  -   Intrauterine pregnancy at 30w0d gestation with reactive fetal status.    -   Fall   - KB test neg   - RH pos, does not need rhogam   - contractions spaced out to none   - no signs of placental abruption on ultrasound    - given that she had one deceration down to the 90s and a overall reactive strip. We will be cautious and admit for observation. Discussed with patient and Dr. Weiss. We will hold off on steroids at this time, given the strip is overall reactive. If she has any more decelerations, we will give steroids.     - ankle pain    - ER consulted for ankle evaluation     - s/p xray     Plan:    Fall   - continuous monitoring for 24 hours, end will be at 530 pm on    - npo in case of emergent  section     Ankle pain   - Tylenol and Flexeril PRN     Routine  - Admit to L&D to obs  - NPO   - T&S and CBC ordered  - SCDs  - IV fluids 125 cc/hr       Dispo  - admit to obs     Electronically signed by Aram Qureshi MD, 23, 6:11 PM EDT.

## 2023-07-31 NOTE — ED PROVIDER NOTES
Subjective   History of Present Illness  Patient presents today due to left lateral malleolus pain and swelling due to a fall earlier today.  Patient states that she tripped over her toddler and missed 2 steps and felt a pop in her left ankle.  She denies previous fractures or dislocations at that ankle.    Review of Systems   Constitutional: Negative.    HENT: Negative.     Eyes: Negative.    Respiratory: Negative.     Cardiovascular: Negative.    Gastrointestinal: Negative.    Endocrine: Negative.    Genitourinary: Negative.    Musculoskeletal:  Positive for arthralgias and joint swelling.   Skin: Negative.  Negative for color change and wound.   Allergic/Immunologic: Negative.    Neurological: Negative.    Hematological: Negative.    Psychiatric/Behavioral: Negative.             Past Medical History:   Diagnosis Date    Anxiety     Asthma     SEASONAL    Other microscopic hematuria 04/11/2022 4/11/2022 r sided flank pain, acute onset, urine dip moderate blood, straight cath 0-2 RBC; no WBC. Renal U/S unremarkable ; suspect small stone vs. Bladder irritation in pregnancy; strain urine. Tylenol as needed.     Postpartum depression 08/23/2022    Second degree perineal laceration during delivery 07/07/2022       Allergies   Allergen Reactions    Lavender Oil Shortness Of Breath       Past Surgical History:   Procedure Laterality Date    WISDOM TOOTH EXTRACTION         Family History   Problem Relation Age of Onset    Diabetes Mother     Diabetes Maternal Grandmother     Cancer Maternal Grandfather         Eye cancer    Diabetes Maternal Grandfather     Diabetes Maternal Uncle     Breast cancer Neg Hx     Ovarian cancer Neg Hx     Uterine cancer Neg Hx     Colon cancer Neg Hx     Pulmonary embolism Neg Hx     Deep vein thrombosis Neg Hx        Social History     Socioeconomic History    Marital status: Single     Spouse name: Justino   Tobacco Use    Smoking status: Never    Smokeless tobacco: Never   Vaping Use     Vaping Use: Never used   Substance and Sexual Activity    Alcohol use: Not Currently    Drug use: Never    Sexual activity: Yes     Partners: Male     Birth control/protection: None           Objective   Physical Exam  Vitals and nursing note reviewed.   Constitutional:       Appearance: Normal appearance. She is normal weight.   HENT:      Head: Normocephalic and atraumatic.      Nose: Nose normal.      Mouth/Throat:      Mouth: Mucous membranes are moist.   Eyes:      Extraocular Movements: Extraocular movements intact.      Pupils: Pupils are equal, round, and reactive to light.   Cardiovascular:      Rate and Rhythm: Normal rate and regular rhythm.      Heart sounds: Normal heart sounds.   Pulmonary:      Effort: Pulmonary effort is normal.      Breath sounds: Normal breath sounds.   Abdominal:      General: Abdomen is flat.   Musculoskeletal:         General: Swelling, tenderness and signs of injury present.      Cervical back: Normal range of motion and neck supple.      Right ankle: Normal.      Left ankle: Swelling present. Tenderness present. Decreased range of motion. Normal pulse.   Skin:     General: Skin is warm and dry.      Capillary Refill: Capillary refill takes less than 2 seconds.      Findings: No bruising or erythema.   Neurological:      General: No focal deficit present.      Mental Status: She is alert and oriented to person, place, and time.   Psychiatric:         Mood and Affect: Mood normal.         Behavior: Behavior normal.       Procedures           ED Course                                           Medical Decision Making      Final diagnoses:   None       ED Disposition  ED Disposition       None            No follow-up provider specified.       Medication List      No changes were made to your prescriptions during this visit.            Ceci Lindquist PA-C  08/04/23 1556

## 2023-08-06 NOTE — PROGRESS NOTES
Routine Prenatal Visit     Subjective  Iftikhar Russell is a 22 y.o.  at 31w1d here for her routine OB visit.   She is taking her prenatal vitamins.Reports no loss of fluid or vaginal bleeding. Patient doing well without any complaints. Pregnancy complicated by:     Patient Active Problem List   Diagnosis    Supervision of other normal pregnancy, antepartum    Anxiety    History of gestational hypertension    Obesity in pregnancy, antepartum    Autistic disorder on the spectrum- low    Late prenatal care    Mild intermittent asthma without complication    Fall         OB History    Para Term  AB Living   3 2 2 0   2   SAB IAB Ectopic Molar Multiple Live Births           0 2      # Outcome Date GA Lbr Brandon/2nd Weight Sex Delivery Anes PTL Lv   3 Current            2 Term 22 39w4d / 00:15 3320 g (7 lb 5.1 oz) M Vag-Spont EPI N HERLINDA   1 Term 21 37w3d  3289 g (7 lb 4 oz) M Vag-Spont EPI  HERLINDA           ROS:   General ROS: negative for - chills or fatigue  Respiratory ROS: negative for - cough or hemoptysis  Cardiovascular ROS: negative for - chest pain or dyspnea on exertion  Genito-Urinary ROS: negative for  change in urinary stream, vaginal discharge   Musculoskeletal ROS: negative for - gait disturbance or joint pain  Dermatological ROS: negative for acne,  dry skin or itching    Objective  Physical Exam:   Vitals:    23 0844   BP: 134/85       Uterine Size: size greater than dates  FHT: 110-160 BPM    General appearance - alert, well appearing, and in no distress  Mental status - alert, oriented to person, place, and time  Abdomen- Soft, Gravid uterus, non-tender to palpation  Musculoskeletal: negative for - gait disturbance or joint pain  Extremeties: negative swelling or cyanosis   Dermatological: negative rashes or skin lesions       Assessment/Plan:   Diagnoses and all orders for this visit:    1. Supervision of other normal pregnancy, antepartum (Primary)  Assessment &  Plan:  STEVENSON finalized: 10/8 by LMP and 25week US (5 day difference)  Genetic testing (NIPS-Quad)/CF/AFP:  CF neg prior preg per pt, too late AFP.  Considering NIPT 2023     COVID: Vaccinated.  Recommended bivalent booster.   Tdap:  Recommended  Flu:script 27-36 weeks     ? Desires Sterilization:None     Anatomy US: Growth 95%, AC 81%, JANETTE WNL, Breech, Anterior placenta-grade 1, FHTS 151, 3VC-normal insertion  FU US:    EPDS-17 at 27 week appointment, counseled on medications but declines at this time. NO thoughts of harming herself or others. Did put 2 on #10 at first and then asked in person and was 0.     PROBLEM LIST/PLAN:   Hx GHTN- baseline labs wnl UPC 0.08  Asthma- inhaler prn  Obesity in preg- pre preg BMI 39,  testing 37 weeks  Patient low spectrum autism  Late PNC- UDS 2023   Hx of anxiety  Hx postpartum depression    Orders:  -     POC Urinalysis Dipstick    2. Late prenatal care    3. History of gestational hypertension    4. Mild intermittent asthma without complication    5. Obesity in pregnancy, antepartum    6. Anxiety    7. Autistic disorder on the spectrum- low    8. Uterine size date discrepancy pregnancy  -     US Ob 14 + Weeks Single or First Gestation; Future            Counseling:   OB precautions, leaking, VB, kalen gotti vs PTL/Labor  FK  HTN precautions reviewed: HA, vision change, RUQ/epigastric pain, edema  Round Ligament Pain:  The uterus has several ligaments which provide support and keep the uterus in place. As the  uterus grows these ligaments are pulled and stretched which often causes sharp stabbing like pain in the inguinal area.   You may find a pregnancy support band helpful. Changing positions may also help. Yoga is a great way to cope with round ligament and low back pain in pregnancy.    Massage may also help with low back pain   Things to Consider at this Point in your Pregnancy:  Some women experience swelling in their feet during pregnancy. Compression  stockings may help  Drink plenty of water and stay active   Make sure you are eating frequent small meals, nuts are a wonderful snack to keep with you            Return in about 2 weeks (around 8/21/2023) for Routine OB visit.      We have gone over prenatal care to include the timing and content of visits. I informed her how to contact the office and/or on call person in the event of any problems and encouraged her to do so when she feels it is necessary.  We then spent time answering her questions which she indicated were answered to her satisfaction.    Amy Carreno DO  8/7/2023 09:04 EDT

## 2023-08-06 NOTE — ASSESSMENT & PLAN NOTE
STEVENSON finalized: 10/8 by LMP and 25week US (5 day difference)  Genetic testing (NIPS-Quad)/CF/AFP:  CF neg prior preg per pt, too late AFP.  Considering NIPT 2023     COVID: Vaccinated.  Recommended bivalent booster.   Tdap:  Recommended  Flu:script 27-36 weeks     ? Desires Sterilization:None     Anatomy US: Growth 95%, AC 81%, JANETTE WNL, Breech, Anterior placenta-grade 1, FHTS 151, 3VC-normal insertion  FU US:    EPDS-17 at 27 week appointment, counseled on medications but declines at this time. NO thoughts of harming herself or others. Did put 2 on #10 at first and then asked in person and was 0.     PROBLEM LIST/PLAN:   Hx GHTN- baseline labs wnl UPC 0.08  Asthma- inhaler prn  Obesity in preg- pre preg BMI 39,  testing 37 weeks  Patient low spectrum autism  Late PNC- UDS 2023   Hx of anxiety  Hx postpartum depression

## 2023-08-07 ENCOUNTER — ROUTINE PRENATAL (OUTPATIENT)
Dept: OBSTETRICS AND GYNECOLOGY | Facility: CLINIC | Age: 23
End: 2023-08-07
Payer: COMMERCIAL

## 2023-08-07 VITALS — BODY MASS INDEX: 40.85 KG/M2 | WEIGHT: 230.6 LBS | DIASTOLIC BLOOD PRESSURE: 85 MMHG | SYSTOLIC BLOOD PRESSURE: 134 MMHG

## 2023-08-07 DIAGNOSIS — Z87.59 HISTORY OF GESTATIONAL HYPERTENSION: ICD-10-CM

## 2023-08-07 DIAGNOSIS — O26.849 UTERINE SIZE DATE DISCREPANCY PREGNANCY: ICD-10-CM

## 2023-08-07 DIAGNOSIS — J45.20 MILD INTERMITTENT ASTHMA WITHOUT COMPLICATION: ICD-10-CM

## 2023-08-07 DIAGNOSIS — F41.9 ANXIETY: ICD-10-CM

## 2023-08-07 DIAGNOSIS — O99.210 OBESITY IN PREGNANCY, ANTEPARTUM: ICD-10-CM

## 2023-08-07 DIAGNOSIS — Z34.80 SUPERVISION OF OTHER NORMAL PREGNANCY, ANTEPARTUM: Primary | ICD-10-CM

## 2023-08-07 DIAGNOSIS — O09.30 LATE PRENATAL CARE: ICD-10-CM

## 2023-08-07 DIAGNOSIS — F84.0 AUTISTIC DISORDER: ICD-10-CM

## 2023-08-07 LAB
GLUCOSE UR STRIP-MCNC: NEGATIVE MG/DL
PROT UR STRIP-MCNC: ABNORMAL MG/DL

## 2023-08-11 ENCOUNTER — TELEPHONE (OUTPATIENT)
Dept: OBSTETRICS AND GYNECOLOGY | Facility: CLINIC | Age: 23
End: 2023-08-11
Payer: COMMERCIAL

## 2023-08-11 ENCOUNTER — HOSPITAL ENCOUNTER (OUTPATIENT)
Facility: HOSPITAL | Age: 23
Discharge: HOME | End: 2023-08-11
Attending: OBSTETRICS & GYNECOLOGY | Admitting: OBSTETRICS & GYNECOLOGY
Payer: COMMERCIAL

## 2023-08-11 VITALS — RESPIRATION RATE: 16 BRPM | SYSTOLIC BLOOD PRESSURE: 132 MMHG | DIASTOLIC BLOOD PRESSURE: 75 MMHG | HEART RATE: 91 BPM

## 2023-08-11 LAB
ALBUMIN SERPL-MCNC: 3.4 G/DL (ref 3.5–5.2)
ALBUMIN/GLOB SERPL: 1.1 G/DL
ALP SERPL-CCNC: 115 U/L (ref 39–117)
ALT SERPL W P-5'-P-CCNC: 18 U/L (ref 1–33)
ANION GAP SERPL CALCULATED.3IONS-SCNC: 11.9 MMOL/L (ref 5–15)
AST SERPL-CCNC: 15 U/L (ref 1–32)
BACTERIA UR QL AUTO: ABNORMAL /HPF
BILIRUB SERPL-MCNC: 0.2 MG/DL (ref 0–1.2)
BILIRUB UR QL STRIP: NEGATIVE
BUN SERPL-MCNC: 7 MG/DL (ref 6–20)
BUN/CREAT SERPL: 14.9 (ref 7–25)
CALCIUM SPEC-SCNC: 9.2 MG/DL (ref 8.6–10.5)
CHLORIDE SERPL-SCNC: 103 MMOL/L (ref 98–107)
CLARITY UR: CLEAR
CO2 SERPL-SCNC: 22.1 MMOL/L (ref 22–29)
COLOR UR: YELLOW
CREAT SERPL-MCNC: 0.47 MG/DL (ref 0.57–1)
CREAT UR-MCNC: 195.8 MG/DL
DEPRECATED RDW RBC AUTO: 48.4 FL (ref 37–54)
EGFRCR SERPLBLD CKD-EPI 2021: 136.5 ML/MIN/1.73
ERYTHROCYTE [DISTWIDTH] IN BLOOD BY AUTOMATED COUNT: 15.3 % (ref 12.3–15.4)
GLOBULIN UR ELPH-MCNC: 3 GM/DL
GLUCOSE SERPL-MCNC: 114 MG/DL (ref 65–99)
GLUCOSE UR STRIP-MCNC: NEGATIVE MG/DL
HCT VFR BLD AUTO: 35.2 % (ref 34–46.6)
HGB BLD-MCNC: 11.6 G/DL (ref 12–15.9)
HGB UR QL STRIP.AUTO: NEGATIVE
HYALINE CASTS UR QL AUTO: ABNORMAL /LPF
KETONES UR QL STRIP: ABNORMAL
LEUKOCYTE ESTERASE UR QL STRIP.AUTO: ABNORMAL
MCH RBC QN AUTO: 29.1 PG (ref 26.6–33)
MCHC RBC AUTO-ENTMCNC: 33 G/DL (ref 31.5–35.7)
MCV RBC AUTO: 88.4 FL (ref 79–97)
NITRITE UR QL STRIP: NEGATIVE
PH UR STRIP.AUTO: 5.5 [PH] (ref 5–8)
PLATELET # BLD AUTO: 200 10*3/MM3 (ref 140–450)
PMV BLD AUTO: 11.1 FL (ref 6–12)
POTASSIUM SERPL-SCNC: 3.2 MMOL/L (ref 3.5–5.2)
PROT ?TM UR-MCNC: 20 MG/DL
PROT SERPL-MCNC: 6.4 G/DL (ref 6–8.5)
PROT UR QL STRIP: ABNORMAL
PROT/CREAT UR: 0.1 MG/G{CREAT}
RBC # BLD AUTO: 3.98 10*6/MM3 (ref 3.77–5.28)
RBC # UR STRIP: ABNORMAL /HPF
REF LAB TEST METHOD: ABNORMAL
SODIUM SERPL-SCNC: 137 MMOL/L (ref 136–145)
SP GR UR STRIP: 1.03 (ref 1–1.03)
SQUAMOUS #/AREA URNS HPF: ABNORMAL /HPF
UROBILINOGEN UR QL STRIP: ABNORMAL
WBC # UR STRIP: ABNORMAL /HPF
WBC NRBC COR # BLD: 7.09 10*3/MM3 (ref 3.4–10.8)

## 2023-08-11 PROCEDURE — 59025 FETAL NON-STRESS TEST: CPT

## 2023-08-11 PROCEDURE — G0463 HOSPITAL OUTPT CLINIC VISIT: HCPCS

## 2023-08-11 PROCEDURE — 81001 URINALYSIS AUTO W/SCOPE: CPT | Performed by: OBSTETRICS & GYNECOLOGY

## 2023-08-11 PROCEDURE — 84156 ASSAY OF PROTEIN URINE: CPT | Performed by: OBSTETRICS & GYNECOLOGY

## 2023-08-11 PROCEDURE — 82570 ASSAY OF URINE CREATININE: CPT | Performed by: OBSTETRICS & GYNECOLOGY

## 2023-08-11 PROCEDURE — 80053 COMPREHEN METABOLIC PANEL: CPT | Performed by: OBSTETRICS & GYNECOLOGY

## 2023-08-11 PROCEDURE — 85027 COMPLETE CBC AUTOMATED: CPT | Performed by: OBSTETRICS & GYNECOLOGY

## 2023-08-11 NOTE — NON STRESS TEST
Obstetrical Non-stress Test Interpretation     Name:  Iftikhar Russell  MRN: [id number]    22 y.o. female  at 31w5d    Indication: CO decreased fetal movement, HA, Dizziness      Fetal Assessment  Fetal Movement: active  Fetal HR Assessment Method: external  Fetal HR (beats/min): 140  Fetal HR Baseline: normal range  Fetal HR Variability: moderate (amplitude range 6 to 25 bpm)  Fetal HR Accelerations: greater than/equal to 15 bpm, lasting at least 15 seconds  Fetal HR Decelerations: absent  Sinusoidal Pattern Present: absent  Fetal HR Tracing Category: Category I    Blood Pressure 132/75   Pulse 91   Respiration 16   Last Menstrual Period 2023     Reason for test: OB Triage, Other (Comment) (C/O decreased fetal movement, HA, and dizziness reports hx of elevated BP with her last two pregnancies)  Date of Test: 2023  Time frame of test:   RN NST Interpretation: Radha Sheehan RN  2023  18:49 EDT

## 2023-08-11 NOTE — TELEPHONE ENCOUNTER
Patient called stating she has only felt the baby move 1x in 2 hours, headache, dizziness and some pain. Patient was advised that she needed to head over to L&D to be evaluated. Patient voiced understanding.

## 2023-08-13 NOTE — OBED NOTES
MILY Bull  Obstetric History and Physical    Chief Complaint   Patient presents with    Decreased Fetal Movement     Also c/o HA and dizziness and pain in legs.  Reports hx of elevated blood pressure with last 2 pregnancies       Subjective     Patient is a 23 y.o. female  currently at 32w0d, who presents with complaint of decreased fetal movements, HA and dizziness.  No fevr or chillsd.  No abdominal pain.  No increased swelliong.    Her prenatal care is benign.  Her previous obstetric/gynecological history is noted for is non-contributory.    The following portions of the patients history were reviewed and updated as appropriate: current medications, allergies, past medical history, past surgical history, past family history, past social history, and problem list .       Prenatal Information:  Prenatal Results       Initial Prenatal Labs       Test Value Reference Range Date Time    Hemoglobin ^ 13.6 g/dL 12.0 - 16.0 23 1323       13.1 g/dL 12.0 - 15.9 23 0111    Hematocrit ^ 42.6 % 36.0 - 46.0 23 1323       40.0 % 34.0 - 46.6 23 0111    Platelets ^ 232 10*3/uL 140 - 440 23 1323       304 10*3/mm3 140 - 450 23 0111    Rubella IgG  1.04 index Immune >0.99 23 1041    Hepatitis B SAg  Non-Reactive  Non-Reactive 23 1041    Hepatitis C Ab  Non-Reactive  Non-Reactive 23 1041    RPR  Non-Reactive  Non-Reactive 21 1413    T. Pallidum Ab   Non-Reactive  Non-Reactive 23 1041    ABO  O   23 1825    Rh  Positive   23 1825    Antibody Screen        HIV  Non-Reactive  Non-Reactive 23 1041    Urine Culture  No growth   23 0947    Gonorrhea  Not Detected  Not Detected  23 0947    Chlamydia  Not Detected  Not Detected  23 0947    TSH  1.800 uIU/mL 0.270 - 4.200 21 1413    HgB A1c         Varicella IgG        HgB Electrophoresis         Cystic fibrosis                   Fetal testing        Test Value Reference Range  Date Time    NIPT        MSAFP        AFP-4                  2nd and 3rd Trimester       Test Value Reference Range Date Time    Hemoglobin (repeated)  11.6 g/dL 12.0 - 15.9 08/11/23 1815       12.0 g/dL 12.0 - 15.9 07/30/23 1835       12.5 g/dL 12.0 - 15.9 06/29/23 1153       13.0 g/dL 12.0 - 15.9 06/13/23 1041    Hematocrit (repeated)  35.2 % 34.0 - 46.6 08/11/23 1815       36.5 % 34.0 - 46.6 07/30/23 1835       37.8 % 34.0 - 46.6 06/29/23 1153       38.1 % 34.0 - 46.6 06/13/23 1041    Platelets   200 10*3/mm3 140 - 450 08/11/23 1815       213 10*3/mm3 140 - 450 07/30/23 1835       211 10*3/mm3 140 - 450 06/29/23 1153       225 10*3/mm3 140 - 450 06/13/23 1041      ^ 232 10*3/uL 140 - 440 03/23/23 1323       304 10*3/mm3 140 - 450 01/17/23 0111    GCT  126 mg/dL 65 - 139 06/29/23 1153    Antibody Screen (repeated)  Negative   07/30/23 1825       Negative   06/13/23 1041    GTT Fasting        GTT 1 Hr        GTT 2 Hr        GTT 3 Hr        Group B Strep                  Other testing        Test Value Reference Range Date Time    Parvo IgG         CMV IgG                   Drug Screening       Test Value Reference Range Date Time    Amphetamine Screen        Barbiturate Screen  Negative  Negative 06/13/23 0947    Benzodiazepine Screen  Negative  Negative 06/13/23 0947    Methadone Screen  Negative  Negative 06/13/23 0947    Phencyclidine Screen        Opiates Screen  Negative  Negative 06/13/23 0947    THC Screen  Negative  Negative 06/13/23 0947    Cocaine Screen  Negative  Negative 06/13/23 0947    Propoxyphene Screen        Buprenorphine Screen        Methamphetamine Screen        Oxycodone Screen  Negative  Negative 06/13/23 0947    Tricyclic Antidepressants Screen                  Legend    ^: Historical                          External Prenatal Results       Pregnancy Outside Results - Transcribed From Office Records - See Scanned Records For Details       Test Value Date Time    ABO  O  07/30/23 1825    Rh   Positive  23 1825    Antibody Screen  Negative  23 1825       Negative  23 1041    Varicella IgG  465 index 10/08/21 1102    Rubella  1.04 index 23 1041    Hgb  11.6 g/dL 23 1815       12.0 g/dL 23 1835       12.5 g/dL 23 1153       13.0 g/dL 23 1041      ^ 13.6 g/dL 23 1323       13.1 g/dL 23 0111    Hct  35.2 % 23 1815       36.5 % 23 1835       37.8 % 23 1153       38.1 % 23 1041      ^ 42.6 % 23 1323       40.0 % 23 0111    Glucose Fasting GTT       Glucose Tolerance Test 1 hour       Glucose Tolerance Test 3 hour       Gonorrhea (discrete)  Not Detected  23 0947    Chlamydia (discrete)  Not Detected  23 0947    RPR  Non-Reactive  21 1413    VDRL       Syphilis Antibody       HBsAg  Non-Reactive  23 1041    Herpes Simplex Virus PCR       Herpes Simplex VIrus Culture       HIV  Non-Reactive  23 1041    Hep C RNA Quant PCR       Hep C Antibody  Non-Reactive  23 1041    AFP       Group B Strep  No Group B Streptococcus isolated  06/15/22 1153    GBS Susceptibility to Clindamycin       GBS Susceptibility to Erythromycin       Fetal Fibronectin       Genetic Testing, Maternal Blood                 Drug Screening       Test Value Date Time    Urine Drug Screen       Amphetamine Screen       Barbiturate Screen  Negative  23 0947    Benzodiazepine Screen  Negative  23 0947    Methadone Screen  Negative  23 0947    Phencyclidine Screen       Opiates Screen  Negative  23 0947    THC Screen  Negative  23 0947    Cocaine Screen       Propoxyphene Screen       Buprenorphine Screen       Methamphetamine Screen       Oxycodone Screen  Negative  23 0947    Tricyclic Antidepressants Screen                 Legend    ^: Historical                             Past OB History:     OB History    Para Term  AB Living   3 2 2 0 0 2   SAB IAB Ectopic Molar  Multiple Live Births   0 0 0 0 0 2      # Outcome Date GA Lbr Brandon/2nd Weight Sex Delivery Anes PTL Lv   3 Current            2 Term 07/07/22 39w4d / 00:15 3320 g (7 lb 5.1 oz) M Vag-Spont EPI N HERLINDA      Name: AMRIT TODD      Apgar1: 8  Apgar5: 9   1 Term 04/25/21 37w3d  3289 g (7 lb 4 oz) M Vag-Spont EPI  HERLINDA       Past Medical History: Past Medical History:   Diagnosis Date    Anxiety     Asthma     SEASONAL    Other microscopic hematuria 04/11/2022 4/11/2022 r sided flank pain, acute onset, urine dip moderate blood, straight cath 0-2 RBC; no WBC. Renal U/S unremarkable ; suspect small stone vs. Bladder irritation in pregnancy; strain urine. Tylenol as needed.     Postpartum depression 08/23/2022    Second degree perineal laceration during delivery 07/07/2022      Past Surgical History Past Surgical History:   Procedure Laterality Date    WISDOM TOOTH EXTRACTION        Family History: Family History   Problem Relation Age of Onset    Diabetes Mother     Diabetes Maternal Grandmother     Cancer Maternal Grandfather         Eye cancer    Diabetes Maternal Grandfather     Diabetes Maternal Uncle     Breast cancer Neg Hx     Ovarian cancer Neg Hx     Uterine cancer Neg Hx     Colon cancer Neg Hx     Pulmonary embolism Neg Hx     Deep vein thrombosis Neg Hx       Social History:  reports that she has never smoked. She has never used smokeless tobacco.   reports that she does not currently use alcohol.   reports no history of drug use.        General ROS: Pertinent items are noted in HPI    Objective       Vital Signs Range for the last 24 hours  Temperature:     Temp Source:     BP:     Pulse:     Respirations:     SPO2:     O2 Amount (l/min):     O2 Devices     Weight:       Physical Examination: General appearance - alert, well appearing, and in no distress  Mental status - alert, oriented to person, place, and time  Chest - clear to auscultation, no wheezes, rales or rhonchi, symmetric air entry  Heart -  normal rate, regular rhythm, normal S1, S2, no murmurs, rubs, clicks or gallops  Abdomen - soft, nontender, Gravid  Extremities - peripheral pulses normal, no pedal edema, no clubbing or cyanosis      Presentation: unknown   Cervix: Exam by:     Dilation:     Effacement:     Station:         Fetal Heart Rate Assessment   Method: Fetal HR Assessment Method: external   Beats/min: Fetal HR (beats/min): 140   Baseline: Fetal HR Baseline: normal range   Variability: Fetal HR Variability: moderate (amplitude range 6 to 25 bpm)   Accels: Fetal HR Accelerations: greater than/equal to 15 bpm, lasting at least 15 seconds   Decels: Fetal HR Decelerations: absent         Uterine Assessment   Method: Method: external tocotransducer   Frequency (min):     Ctx Count in 10 min:     Duration:     Intensity: Contraction Intensity: no contractions       [place] Units:       GBS is Uknown      Assessment & Plan     Decreased fetal movements  Dizziness      Assessment:  1.  Intrauterine pregnancy at 32w0d gestation with reactive fetal status.    2. IUP at 32 weeks EGA with decreased fetal movements.  Testing is reassuring.  Dizziness is likely related to inadequate PO intake.    Plan:  D/C home  Strict FCK  Increase PO intake.    Pepito Lee MD  8/13/2023  11:50 EDT

## 2023-08-20 NOTE — ASSESSMENT & PLAN NOTE
STEVENSON finalized: 10/8 by LMP and 25week US (5 day difference)  Genetic testing (NIPS-Quad)/CF/AFP:  CF neg prior preg per pt, too late AFP.  Considering NIPT 2023     COVID: Vaccinated.  Recommended bivalent booster.   Tdap:  Recommended  Flu:script 27-36 weeks     ? Desires Sterilization:None     Anatomy US:Growth 95%, 2 weeks ahead of STEEVNSON, All anatomy Seen and WNL  FU US:repeat growth ordered     EPDS-17 at 27 week appointment, counseled on medications but declines at this time. NO thoughts of harming herself or others. Did put 2 on #10 at first and then asked in person and was 0.     PROBLEM LIST/PLAN:   Hx GHTN- baseline labs wnl UPC 0.08  Asthma- inhaler prn  Obesity in preg- pre preg BMI 39,  testing 37 weeks  Patient low spectrum autism  Late PNC- UDS 2023   Hx of anxiety  Hx postpartum depression

## 2023-08-20 NOTE — PROGRESS NOTES
Routine Prenatal Visit     Subjective  Iftikhar Russell is a 23 y.o.  at 33w1d here for her routine OB visit.   She is taking her prenatal vitamins.Reports no loss of fluid or vaginal bleeding. Patient doing well without any complaints. Pregnancy complicated by:     Patient Active Problem List   Diagnosis    Supervision of other normal pregnancy, antepartum    Anxiety    History of gestational hypertension    Obesity in pregnancy, antepartum    Autistic disorder on the spectrum- low    Late prenatal care    Mild intermittent asthma without complication    Fall         OB History    Para Term  AB Living   3 2 2 0   2   SAB IAB Ectopic Molar Multiple Live Births           0 2      # Outcome Date GA Lbr Brandon/2nd Weight Sex Delivery Anes PTL Lv   3 Current            2 Term 22 39w4d / 00:15 3320 g (7 lb 5.1 oz) M Vag-Spont EPI N HERLINDA   1 Term 21 37w3d  3289 g (7 lb 4 oz) M Vag-Spont EPI  HERLINDA           ROS:   General ROS: negative for - chills or fatigue  Respiratory ROS: negative for - cough or hemoptysis  Cardiovascular ROS: negative for - chest pain or dyspnea on exertion  Genito-Urinary ROS: negative for  change in urinary stream, vaginal discharge   Musculoskeletal ROS: negative for - gait disturbance or joint pain  Dermatological ROS: negative for acne,  dry skin or itching    Objective  Physical Exam:   Vitals:    23 1111   BP: 111/73       Uterine Size: size equals dates  FHT: 110-160 BPM    General appearance - alert, well appearing, and in no distress  Mental status - alert, oriented to person, place, and time  Abdomen- Soft, Gravid uterus, non-tender to palpation  Musculoskeletal: negative for - gait disturbance or joint pain  Extremeties: negative swelling or cyanosis   Dermatological: negative rashes or skin lesions       Assessment/Plan:   Diagnoses and all orders for this visit:    1. Supervision of other normal pregnancy, antepartum (Primary)  Assessment &  Plan:  STEVENSON finalized: 10/8 by LMP and 25week US (5 day difference)  Genetic testing (NIPS-Quad)/CF/AFP:  CF neg prior preg per pt, too late AFP.  Considering NIPT 2023     COVID: Vaccinated.  Recommended bivalent booster.   Tdap:  Recommended  Flu:script 27-36 weeks     ? Desires Sterilization:None     Anatomy US:Growth 95%, 2 weeks ahead of STEVENSON, All anatomy Seen and WNL  FU US:repeat growth ordered     EPDS-17 at 27 week appointment, counseled on medications but declines at this time. NO thoughts of harming herself or others. Did put 2 on #10 at first and then asked in person and was 0.     PROBLEM LIST/PLAN:   Hx GHTN- baseline labs wnl UPC 0.08  Asthma- inhaler prn  Obesity in preg- pre preg BMI 39,  testing 37 weeks  Patient low spectrum autism  Late PNC- UDS 2023   Hx of anxiety  Hx postpartum depression        Orders:  -     POC Urinalysis Dipstick    2. Obesity in pregnancy, antepartum  -     Fetal Nonstress Test; Standing    3. Late prenatal care    4. History of gestational hypertension    5. Asthma affecting pregnancy, antepartum    6. Anxiety            Counseling:   OB precautions, leaking, VB, kalen gotti vs PTL/Labor  FKC  HTN precautions reviewed: HA, vision change, RUQ/epigastric pain, edema  Round Ligament Pain:  The uterus has several ligaments which provide support and keep the uterus in place. As the  uterus grows these ligaments are pulled and stretched which often causes sharp stabbing like pain in the inguinal area.   You may find a pregnancy support band helpful. Changing positions may also help. Yoga is a great way to cope with round ligament and low back pain in pregnancy.    Massage may also help with low back pain   Things to Consider at this Point in your Pregnancy:  Some women experience swelling in their feet during pregnancy. Compression stockings may help  Drink plenty of water and stay active   Make sure you are eating frequent small meals, nuts are a wonderful  snack to keep with you            Return in about 2 weeks (around 9/4/2023) for Routine OB visit.      We have gone over prenatal care to include the timing and content of visits. I informed her how to contact the office and/or on call person in the event of any problems and encouraged her to do so when she feels it is necessary.  We then spent time answering her questions which she indicated were answered to her satisfaction.    Amy Carreno,   8/21/2023 11:18 EDT

## 2023-08-21 ENCOUNTER — ROUTINE PRENATAL (OUTPATIENT)
Dept: OBSTETRICS AND GYNECOLOGY | Facility: CLINIC | Age: 23
End: 2023-08-21
Payer: COMMERCIAL

## 2023-08-21 VITALS — SYSTOLIC BLOOD PRESSURE: 111 MMHG | BODY MASS INDEX: 40.88 KG/M2 | DIASTOLIC BLOOD PRESSURE: 73 MMHG | WEIGHT: 230.8 LBS

## 2023-08-21 DIAGNOSIS — J45.909 ASTHMA AFFECTING PREGNANCY, ANTEPARTUM: ICD-10-CM

## 2023-08-21 DIAGNOSIS — F41.9 ANXIETY: ICD-10-CM

## 2023-08-21 DIAGNOSIS — O99.210 OBESITY IN PREGNANCY, ANTEPARTUM: ICD-10-CM

## 2023-08-21 DIAGNOSIS — O09.30 LATE PRENATAL CARE: ICD-10-CM

## 2023-08-21 DIAGNOSIS — O36.60X0 EXCESSIVE FETAL GROWTH AFFECTING MANAGEMENT OF PREGNANCY, ANTEPARTUM, SINGLE OR UNSPECIFIED FETUS: ICD-10-CM

## 2023-08-21 DIAGNOSIS — Z87.59 HISTORY OF GESTATIONAL HYPERTENSION: ICD-10-CM

## 2023-08-21 DIAGNOSIS — O99.519 ASTHMA AFFECTING PREGNANCY, ANTEPARTUM: ICD-10-CM

## 2023-08-21 DIAGNOSIS — Z34.80 SUPERVISION OF OTHER NORMAL PREGNANCY, ANTEPARTUM: Primary | ICD-10-CM

## 2023-08-21 LAB
GLUCOSE UR STRIP-MCNC: NEGATIVE MG/DL
PROT UR STRIP-MCNC: NEGATIVE MG/DL

## 2023-08-21 PROCEDURE — 99213 OFFICE O/P EST LOW 20 MIN: CPT | Performed by: OBSTETRICS & GYNECOLOGY

## 2023-08-23 ENCOUNTER — TELEPHONE (OUTPATIENT)
Dept: OBSTETRICS AND GYNECOLOGY | Facility: CLINIC | Age: 23
End: 2023-08-23
Payer: COMMERCIAL

## 2023-08-28 ENCOUNTER — HOSPITAL ENCOUNTER (OUTPATIENT)
Facility: HOSPITAL | Age: 23
Discharge: HOME | End: 2023-08-28
Attending: OBSTETRICS & GYNECOLOGY | Admitting: OBSTETRICS & GYNECOLOGY
Payer: COMMERCIAL

## 2023-08-28 VITALS
OXYGEN SATURATION: 97 % | HEART RATE: 117 BPM | DIASTOLIC BLOOD PRESSURE: 56 MMHG | HEIGHT: 63 IN | BODY MASS INDEX: 40.75 KG/M2 | WEIGHT: 230 LBS | TEMPERATURE: 98.6 F | RESPIRATION RATE: 16 BRPM | SYSTOLIC BLOOD PRESSURE: 121 MMHG

## 2023-08-28 PROBLEM — O99.891 BACK PAIN AFFECTING PREGNANCY IN THIRD TRIMESTER: Status: ACTIVE | Noted: 2023-08-28

## 2023-08-28 PROBLEM — O21.9 NAUSEA AND VOMITING IN PREGNANCY: Status: ACTIVE | Noted: 2023-08-28

## 2023-08-28 PROBLEM — M54.9 BACK PAIN AFFECTING PREGNANCY IN THIRD TRIMESTER: Status: ACTIVE | Noted: 2023-08-28

## 2023-08-28 LAB
BILIRUB BLD-MCNC: NEGATIVE MG/DL
CLARITY, POC: CLEAR
COLOR UR: YELLOW
GLUCOSE UR STRIP-MCNC: NEGATIVE MG/DL
KETONES UR QL: NEGATIVE
LEUKOCYTE EST, POC: NEGATIVE
NITRITE UR-MCNC: NEGATIVE MG/ML
PH UR: 7 [PH] (ref 5–8)
PROT UR STRIP-MCNC: NEGATIVE MG/DL
RBC # UR STRIP: ABNORMAL /UL
SP GR UR: 1.02 (ref 1–1.03)
UROBILINOGEN UR QL: ABNORMAL

## 2023-08-28 PROCEDURE — 59025 FETAL NON-STRESS TEST: CPT

## 2023-08-28 PROCEDURE — 81002 URINALYSIS NONAUTO W/O SCOPE: CPT | Performed by: OBSTETRICS & GYNECOLOGY

## 2023-08-28 PROCEDURE — G0463 HOSPITAL OUTPT CLINIC VISIT: HCPCS

## 2023-08-28 PROCEDURE — 25010000002 PROCHLORPERAZINE 10 MG/2ML SOLUTION: Performed by: OBSTETRICS & GYNECOLOGY

## 2023-08-28 PROCEDURE — 96374 THER/PROPH/DIAG INJ IV PUSH: CPT

## 2023-08-28 PROCEDURE — 63710000001 ONDANSETRON ODT 4 MG TABLET DISPERSIBLE: Performed by: OBSTETRICS & GYNECOLOGY

## 2023-08-28 RX ORDER — ONDANSETRON 4 MG/1
4 TABLET, ORALLY DISINTEGRATING ORAL ONCE
Status: COMPLETED | OUTPATIENT
Start: 2023-08-28 | End: 2023-08-28

## 2023-08-28 RX ORDER — PROCHLORPERAZINE EDISYLATE 5 MG/ML
10 INJECTION INTRAMUSCULAR; INTRAVENOUS ONCE
Status: COMPLETED | OUTPATIENT
Start: 2023-08-28 | End: 2023-08-28

## 2023-08-28 RX ORDER — SODIUM CHLORIDE, SODIUM LACTATE, POTASSIUM CHLORIDE, CALCIUM CHLORIDE 600; 310; 30; 20 MG/100ML; MG/100ML; MG/100ML; MG/100ML
125 INJECTION, SOLUTION INTRAVENOUS CONTINUOUS
Status: DISCONTINUED | OUTPATIENT
Start: 2023-08-28 | End: 2023-08-28 | Stop reason: HOSPADM

## 2023-08-28 RX ADMIN — PROCHLORPERAZINE EDISYLATE 10 MG: 5 INJECTION, SOLUTION INTRAMUSCULAR; INTRAVENOUS at 19:23

## 2023-08-28 RX ADMIN — ONDANSETRON 4 MG: 4 TABLET, ORALLY DISINTEGRATING ORAL at 17:50

## 2023-08-28 RX ADMIN — SODIUM CHLORIDE, POTASSIUM CHLORIDE, SODIUM LACTATE AND CALCIUM CHLORIDE 1000 ML: 600; 310; 30; 20 INJECTION, SOLUTION INTRAVENOUS at 19:22

## 2023-08-28 NOTE — NON STRESS TEST
"Obstetrical Non-stress Test Interpretation     Name:  Iftikhar Russell  MRN: [id number]    23 y.o. female  at 34w1d    Indication: Back, hip, thigh pain      Fetal Assessment  Fetal Movement: active  Fetal HR Assessment Method: external  Fetal HR (beats/min): 155  Fetal HR Baseline: normal range  Fetal HR Variability: moderate (amplitude range 6 to 25 bpm)  Fetal HR Accelerations: episodic, greater than/equal to 15 bpm  Fetal HR Decelerations: absent  Sinusoidal Pattern Present: absent  Fetal HR Tracing Category: Category I    /56   Pulse 117   Temp 98.6 øF (37 øC) (Oral)   Resp 16   Ht 160 cm (63\")   Wt 104 kg (230 lb)   LMP 2023   SpO2 97%   BMI 40.74 kg/mý     Reason for test: OB Triage, Other (Comment) (Back pain, hip and behind each thigh pain, vomiting)  Date of Test: 2023  Time frame of test: 7935-7536  RN NST Interpretation: Reactive      Julieta Smith RN  2023  18:54 EDT    I have reviewed NST and agree it is reactive. TNEELY   "

## 2023-08-29 NOTE — PROGRESS NOTES
MILY Blul  Obstetric History and Physical    Chief Complaint   Patient presents with    Back Pain     Hips and leg pain. vomiting         Subjective:   HPI:    Patient is a 23 y.o. female  currently at 34w1d, who presents with complaints of back pain and hip pain along with nausea and vomiting.  The pain is in her hips and back made worse by movement consistent with effect of hormone relaxin in pregnancy discussed at length with patient.    Her prenatal care is through [facility].  Past OB history is noted below.      The following portions of the patients history were reviewed and updated as appropriate:   current medications, allergies, past medical history, past surgical history, past family history, past social history and current problem list.     Prenatal Information:  Prenatal Results       Initial Prenatal Labs       Test Value Reference Range Date Time    Hemoglobin ^ 13.6 g/dL 12.0 - 16.0 23 1323       13.1 g/dL 12.0 - 15.9 23 0111    Hematocrit ^ 42.6 % 36.0 - 46.0 23 1323       40.0 % 34.0 - 46.6 23 0111    Platelets ^ 232 10*3/uL 140 - 440 23 1323       304 10*3/mm3 140 - 450 23 0111    Rubella IgG  1.04 index Immune >0.99 23 1041    Hepatitis B SAg  Non-Reactive  Non-Reactive 23 1041    Hepatitis C Ab  Non-Reactive  Non-Reactive 23 1041    RPR  Non-Reactive  Non-Reactive 21 1413    T. Pallidum Ab   Non-Reactive  Non-Reactive 23 1041    ABO  O   23 1825    Rh  Positive   23 1825    Antibody Screen        HIV  Non-Reactive  Non-Reactive 23 1041    Urine Culture  No growth   23 0947    Gonorrhea  Not Detected  Not Detected  23 0947    Chlamydia  Not Detected  Not Detected  23 0947    TSH  1.800 uIU/mL 0.270 - 4.200 21 1413    HgB A1c         Varicella IgG        HgB Electrophoresis         Cystic fibrosis                   Fetal testing        Test Value Reference Range Date Time     NIPT        MSAFP        AFP-4                  2nd and 3rd Trimester       Test Value Reference Range Date Time    Hemoglobin (repeated)  11.6 g/dL 12.0 - 15.9 08/11/23 1815       12.0 g/dL 12.0 - 15.9 07/30/23 1835       12.5 g/dL 12.0 - 15.9 06/29/23 1153       13.0 g/dL 12.0 - 15.9 06/13/23 1041    Hematocrit (repeated)  35.2 % 34.0 - 46.6 08/11/23 1815       36.5 % 34.0 - 46.6 07/30/23 1835       37.8 % 34.0 - 46.6 06/29/23 1153       38.1 % 34.0 - 46.6 06/13/23 1041    Platelets   200 10*3/mm3 140 - 450 08/11/23 1815       213 10*3/mm3 140 - 450 07/30/23 1835       211 10*3/mm3 140 - 450 06/29/23 1153       225 10*3/mm3 140 - 450 06/13/23 1041      ^ 232 10*3/uL 140 - 440 03/23/23 1323       304 10*3/mm3 140 - 450 01/17/23 0111    GCT  126 mg/dL 65 - 139 06/29/23 1153    Antibody Screen (repeated)  Negative   07/30/23 1825       Negative   06/13/23 1041    GTT Fasting        GTT 1 Hr        GTT 2 Hr        GTT 3 Hr        Group B Strep                  Other testing        Test Value Reference Range Date Time    Parvo IgG         CMV IgG                   Drug Screening       Test Value Reference Range Date Time    Amphetamine Screen        Barbiturate Screen  Negative  Negative 06/13/23 0947    Benzodiazepine Screen  Negative  Negative 06/13/23 0947    Methadone Screen  Negative  Negative 06/13/23 0947    Phencyclidine Screen        Opiates Screen  Negative  Negative 06/13/23 0947    THC Screen  Negative  Negative 06/13/23 0947    Cocaine Screen  Negative  Negative 06/13/23 0947    Propoxyphene Screen        Buprenorphine Screen        Methamphetamine Screen        Oxycodone Screen  Negative  Negative 06/13/23 0947    Tricyclic Antidepressants Screen                  Legend    ^: Historical                          External Prenatal Results       Pregnancy Outside Results - Transcribed From Office Records - See Scanned Records For Details       Test Value Date Time    ABO  O  07/30/23 1825    Rh  Positive   23 1825    Antibody Screen  Negative  23 1825       Negative  23 1041    Varicella IgG  465 index 10/08/21 1102    Rubella  1.04 index 23 1041    Hgb  11.6 g/dL 23 1815       12.0 g/dL 23 1835       12.5 g/dL 23 1153       13.0 g/dL 23 1041      ^ 13.6 g/dL 23 1323       13.1 g/dL 23 0111    Hct  35.2 % 23 1815       36.5 % 23 1835       37.8 % 23 1153       38.1 % 23 1041      ^ 42.6 % 23 1323       40.0 % 23 0111    Glucose Fasting GTT       Glucose Tolerance Test 1 hour       Glucose Tolerance Test 3 hour       Gonorrhea (discrete)  Not Detected  23 0947    Chlamydia (discrete)  Not Detected  23 0947    RPR  Non-Reactive  21 1413    VDRL       Syphilis Antibody       HBsAg  Non-Reactive  23 1041    Herpes Simplex Virus PCR       Herpes Simplex VIrus Culture       HIV  Non-Reactive  23 1041    Hep C RNA Quant PCR       Hep C Antibody  Non-Reactive  23 1041    AFP       Group B Strep  No Group B Streptococcus isolated  06/15/22 1153    GBS Susceptibility to Clindamycin       GBS Susceptibility to Erythromycin       Fetal Fibronectin       Genetic Testing, Maternal Blood                 Drug Screening       Test Value Date Time    Urine Drug Screen       Amphetamine Screen       Barbiturate Screen  Negative  23 0947    Benzodiazepine Screen  Negative  23 0947    Methadone Screen  Negative  23 0947    Phencyclidine Screen       Opiates Screen  Negative  23 0947    THC Screen  Negative  23 0947    Cocaine Screen       Propoxyphene Screen       Buprenorphine Screen       Methamphetamine Screen       Oxycodone Screen  Negative  23 0947    Tricyclic Antidepressants Screen                 Legend    ^: Historical                             Past OB History:     OB History    Para Term  AB Living   3 2 2 0 0 2   SAB IAB Ectopic Molar Multiple  Live Births   0 0 0 0 0 2      # Outcome Date GA Lbr Brandon/2nd Weight Sex Delivery Anes PTL Lv   3 Current            2 Term 07/07/22 39w4d / 00:15 3320 g (7 lb 5.1 oz) M Vag-Spont EPI N HERLINDA      Name: AMRIT TODD      Apgar1: 8  Apgar5: 9   1 Term 04/25/21 37w3d  3289 g (7 lb 4 oz) M Vag-Spont EPI  HERLINDA       Past Medical History: Past Medical History:   Diagnosis Date    Anxiety     Asthma     SEASONAL    Other microscopic hematuria 04/11/2022 4/11/2022 r sided flank pain, acute onset, urine dip moderate blood, straight cath 0-2 RBC; no WBC. Renal U/S unremarkable ; suspect small stone vs. Bladder irritation in pregnancy; strain urine. Tylenol as needed.     Postpartum depression 08/23/2022    Second degree perineal laceration during delivery 07/07/2022      Past Surgical History Past Surgical History:   Procedure Laterality Date    WISDOM TOOTH EXTRACTION        Family History: Family History   Problem Relation Age of Onset    Diabetes Mother     Diabetes Maternal Grandmother     Cancer Maternal Grandfather         Eye cancer    Diabetes Maternal Grandfather     Diabetes Maternal Uncle     Breast cancer Neg Hx     Ovarian cancer Neg Hx     Uterine cancer Neg Hx     Colon cancer Neg Hx     Pulmonary embolism Neg Hx     Deep vein thrombosis Neg Hx       Social History:  reports that she has never smoked. She has never used smokeless tobacco.   reports that she does not currently use alcohol.   reports no history of drug use.        General ROS: Pertinent items are noted in HPI    Home Medications:  albuterol, albuterol sulfate HFA, aspirin, metoclopramide, and prenatal vitamin 27-0.8    Allergies:  Allergies   Allergen Reactions    Lavender Oil Shortness Of Breath       Objective       Vital Signs Range for the last 24 hours  Temperature: Temp:  [98.6 øF (37 øC)] 98.6 øF (37 øC)   Temp Source: Temp src: Oral   BP: BP: (121)/(56) 121/56   Pulse: Heart Rate:  [] 117   Respirations: Resp:  [16] 16   SPO2:  SpO2:  [97 %-99 %] 97 %     Physical Examination:   General appearance - alert, well appearing, and in no distress  Mental status - alert, oriented to person, place, and time  Chest - clear to auscultation, no wheezes, rales or rhonchi, symmetric air entry  Heart - normal rate, regular rhythm, normal S1, S2, no murmurs, rubs, clicks or gallops  Abdomen - soft, nontender, nondistended, no masses or organomegaly  Pelvic - normal external genitalia, vulva, vagina, cervix, uterus and adnexa  Back exam - full range of motion, no tenderness, palpable spasm or pain on motion  Neurological - alert, oriented, normal speech, no focal findings or movement disorder noted  Extremities - peripheral pulses normal, no pedal edema, no clubbing or cyanosis  Skin - normal coloration and turgor, no rashes, no suspicious skin lesions noted     Cervix: Method: sterile exam per RN   Dilation: Cervical Dilation (cm): 1-2   Effacement: Cervical Effacement: 50%   Station:         Fetal Heart Rate Assessment   Method: Fetal HR Assessment Method: external   Beats/min: Fetal HR (beats/min): 150   Baseline: Fetal HR Baseline: normal range   Variability: Fetal HR Variability: moderate (amplitude range 6 to 25 bpm)   Accels: Fetal HR Accelerations: episodic, greater than/equal to 15 bpm, lasting at least 15 seconds   Decels: Fetal HR Decelerations: absent   Tracing Category: Fetal HR Tracing Category: Category I     Uterine Assessment   Method: Method: external tocotransducer   Frequency (min):     Ctx Count in 10 min:     Duration:     Intensity: Contraction Intensity: no contractions   [place] Units:            Assessment & Plan       ASSESSMENT with PLAN:   Active Hospital Problems    Diagnosis  POA    Back pain affecting pregnancy in third trimester [O99.891, M54.9]  Unknown     8/28/2023 patient complains of low back pain with some bilateral leg radiation to the legs both front and back not true sciatica.  Discussed conservative care.   Discussed PT referral      Nausea and vomiting in pregnancy [O21.9]  Unknown     8/28/2023 patient with nausea and vomiting at the 34 weeks IV given hydrated noted that ketones were negative                Plan of care has been reviewed with patient and patient agrees.   Risks, benefits of treatment plan have been discussed.  All questions have been answered.        Electronically signed by Yuniel Galaviz MD, 08/28/23, 10:48 PM EDT.

## 2023-08-29 NOTE — NURSING NOTE
Dr. Galaviz rounded on pt. Pt stated she was feeling better and would like to go home. Verbal order for DC placed at this time. Third trimester of pregnancy education and s/s of labor reviewed with patient and mother. No questions at this time, both stated verbal understanding. Pt and mother left ambulatory via private vehicle undelivered at this time.

## 2023-08-29 NOTE — NON STRESS TEST
"Obstetrical Non-stress Test Interpretation     Name:  Iftikhar Russell  MRN: [id number]    23 y.o. female  at 34w1d    Indication: back, hip, thigh pain      Fetal Assessment  Fetal Movement: active  Fetal HR Assessment Method: external  Fetal HR (beats/min): 150  Fetal HR Baseline: normal range  Fetal HR Variability: moderate (amplitude range 6 to 25 bpm)  Fetal HR Accelerations: episodic, greater than/equal to 15 bpm, lasting at least 15 seconds  Fetal HR Decelerations: absent  Sinusoidal Pattern Present: absent  Fetal HR Tracing Category: Category I    /56   Pulse 117   Temp 98.6 øF (37 øC) (Oral)   Resp 16   Ht 160 cm (63\")   Wt 104 kg (230 lb)   LMP 2023   SpO2 97%   BMI 40.74 kg/mý     Reason for test: OB Triage, Other (Comment) (Back pain, hip and behind each thigh pain, vomiting)  Date of Test: 2023  Time frame of test:   RN NST Interpretation: Reactive      Lamar Lopez RN  2023  21:32 EDT      I have reviewed NST and agree it is reactive. TNEELY   "

## 2023-09-05 ENCOUNTER — ROUTINE PRENATAL (OUTPATIENT)
Dept: OBSTETRICS AND GYNECOLOGY | Facility: CLINIC | Age: 23
End: 2023-09-05
Payer: COMMERCIAL

## 2023-09-05 VITALS — BODY MASS INDEX: 40.39 KG/M2 | SYSTOLIC BLOOD PRESSURE: 118 MMHG | DIASTOLIC BLOOD PRESSURE: 82 MMHG | WEIGHT: 228 LBS

## 2023-09-05 DIAGNOSIS — O09.30 LATE PRENATAL CARE: ICD-10-CM

## 2023-09-05 DIAGNOSIS — O36.60X0 EXCESSIVE FETAL GROWTH AFFECTING MANAGEMENT OF PREGNANCY, ANTEPARTUM, SINGLE OR UNSPECIFIED FETUS: ICD-10-CM

## 2023-09-05 DIAGNOSIS — O99.210 OBESITY IN PREGNANCY, ANTEPARTUM: ICD-10-CM

## 2023-09-05 DIAGNOSIS — Z87.59 HISTORY OF GESTATIONAL HYPERTENSION: ICD-10-CM

## 2023-09-05 DIAGNOSIS — Z34.80 SUPERVISION OF OTHER NORMAL PREGNANCY, ANTEPARTUM: Primary | ICD-10-CM

## 2023-09-05 DIAGNOSIS — O21.9 NAUSEA AND VOMITING IN PREGNANCY: ICD-10-CM

## 2023-09-05 PROBLEM — M54.9 BACK PAIN AFFECTING PREGNANCY IN THIRD TRIMESTER: Status: RESOLVED | Noted: 2023-08-28 | Resolved: 2023-09-05

## 2023-09-05 PROBLEM — O99.891 BACK PAIN AFFECTING PREGNANCY IN THIRD TRIMESTER: Status: RESOLVED | Noted: 2023-08-28 | Resolved: 2023-09-05

## 2023-09-05 LAB
GLUCOSE UR STRIP-MCNC: NEGATIVE MG/DL
PROT UR STRIP-MCNC: ABNORMAL MG/DL

## 2023-09-05 NOTE — PROGRESS NOTES
OB FOLLOW UP    CC: Scheduled OB routine Follow up    Prenatal care complicated by:   Patient Active Problem List   Diagnosis    Supervision of other normal pregnancy, antepartum    Anxiety    History of gestational hypertension    Obesity in pregnancy, antepartum    Autistic disorder on the spectrum- low    Late prenatal care    Mild intermittent asthma without complication    Fall    Excessive fetal growth affecting management of pregnancy, antepartum    Nausea and vomiting in pregnancy       Subjective:   Patient has: No complaints, No leaking fluid, No vaginal bleeding, Adequate FM  Reports some contractions.  Reports she was 2 cm dilated on 828 at her visit to the hospital.  Desires induction of labor.    Objective:  Urine glucose/protein- see flow sheet      /82   Wt 103 kg (228 lb)   LMP 01/01/2023   BMI 40.39 kg/m²   See OB flow for LE edema, and cvx exam if applicable  FHT: 147 BPM   Uterine Size:  37 cm      Assessment and Plan:  Diagnoses and all orders for this visit:    1. Supervision of other normal pregnancy, antepartum (Primary)  Overview:  STEVENSON finalized: 10/8 by LMP and 25week US (5 day difference)  Genetic testing (NIPS-Quad)/CF/AFP:  CF neg prior preg per pt, too late AFP.  Considering NIPT 6/13/2023     COVID: Vaccinated.  Recommended bivalent booster.   Tdap:  Recommended  Flu:script 27-36 weeks     Anatomy US:Growth 95%, 2 weeks ahead of STEVENSON, All anatomy Seen and WNL  FU US:Growth 95%, AC 92%, JANETTE 16cm, FHTS 158, Anterior placenta-grade 1, vertex   FU US: repeat for growth ordered     Assessment & Plan:  GBS next office visit  Continue prenatal vitamins  Fetal kick counts  Labor instructions    Orders:  -     POC Urinalysis Dipstick    2. Obesity in pregnancy, antepartum  Assessment & Plan:  Continue NSTs      3. Late prenatal care  Overview:  Due to recent move from [place] and insurance issues      4. History of gestational hypertension  Overview:  G1 36 week IOL  G2 37 week  IOL      5. Excessive fetal growth affecting management of pregnancy, antepartum, single or unspecified fetus  Overview:  FU US:Growth 95%, AC 92%, JANETTE 16cm, FHTS 158, Anterior placenta-grade 1, vertex   FU US: repeat for growth ordered     Assessment & Plan:  Growth ultrasound pending      6. Nausea and vomiting in pregnancy  Assessment & Plan:  Still having some symptoms.  Continue Reglan 10 mg 4 times a day as needed        35w2d  Reassuring pregnancy progress    Counseling: OB precautions, leaking, VB, kalen gotti vs PTL/Labor  FKC    Questions answered    Return in about 1 week (around 9/12/2023) for Recheck.      Jluis Aragon MD  09/05/2023

## 2023-09-11 ENCOUNTER — TELEPHONE (OUTPATIENT)
Dept: OBSTETRICS AND GYNECOLOGY | Facility: CLINIC | Age: 23
End: 2023-09-11

## 2023-09-17 NOTE — ASSESSMENT & PLAN NOTE
STEVENSON finalized: 10/8 by LMP and 25week US (5 day difference)  Genetic testing (NIPS-Quad)/CF/AFP:  CF neg prior preg per pt, too late AFP.  Considering NIPT 2023     COVID: Vaccinated.  Recommended bivalent booster.   Tdap:  Recommended  Flu:script 27-36 weeks     ? Desires Sterilization:None     Anatomy US:Growth 95%, 2 weeks ahead of STEVENSON, All anatomy Seen and WNL  FU US:Growth 95%, AC 92%, JANETTE 16cm, FHTS 158, Anterior placenta-grade 1, vertex   FU US: repeat for growth ordered     EPDS-17 at 27 week appointment, counseled on medications but declines at this time. NO thoughts of harming herself or others. Did put 2 on #10 at first and then asked in person and was 0.       PROBLEM LIST/PLAN:   Hx GHTN- baseline labs wnl UPC 0.08  Asthma- inhaler prn  Obesity in preg- pre preg BMI 39,  testing 37 weeks  Patient low spectrum autism  Late PNC- UDS 2023   Hx of anxiety  Hx postpartum depression

## 2023-09-17 NOTE — PROGRESS NOTES
Routine Prenatal Visit     Subjective  Iftikhar Russell is a 23 y.o.  at 37w1d here for her routine OB visit.   She is taking her prenatal vitamins.Reports no loss of fluid or vaginal bleeding. Patient doing well without any complaints. Pregnancy complicated by:     Patient Active Problem List   Diagnosis    Supervision of other normal pregnancy, antepartum    Anxiety    History of gestational hypertension    Obesity in pregnancy, antepartum    Autistic disorder on the spectrum- low    Late prenatal care    Mild intermittent asthma without complication    Fall    Excessive fetal growth affecting management of pregnancy, antepartum    Nausea and vomiting in pregnancy         OB History    Para Term  AB Living   3 2 2 0   2   SAB IAB Ectopic Molar Multiple Live Births           0 2      # Outcome Date GA Lbr Brandon/2nd Weight Sex Delivery Anes PTL Lv   3 Current            2 Term 22 39w4d / 00:15 3320 g (7 lb 5.1 oz) M Vag-Spont EPI N HERLINDA   1 Term 21 37w3d  3289 g (7 lb 4 oz) M Vag-Spont EPI  HERLINDA           ROS:   General ROS: negative for - chills or fatigue  Respiratory ROS: negative for - cough or hemoptysis  Cardiovascular ROS: negative for - chest pain or dyspnea on exertion  Genito-Urinary ROS: negative for  change in urinary stream, vaginal discharge   Musculoskeletal ROS: negative for - gait disturbance or joint pain  Dermatological ROS: negative for acne,  dry skin or itching    Objective  Physical Exam:   Vitals:    23 1357   BP: 134/82       Uterine Size: not examined, US today  FHT: 110-160 BPM    General appearance - alert, well appearing, and in no distress  Mental status - alert, oriented to person, place, and time  Abdomen- Soft, Gravid uterus, non-tender to palpation  Musculoskeletal: negative for - gait disturbance or joint pain  Extremeties: negative swelling or cyanosis   Dermatological: negative rashes or skin lesions   Pelvic exam :  3/70/-2/mid/soft    Assessment/Plan:   Diagnoses and all orders for this visit:    1. Supervision of other normal pregnancy, antepartum (Primary)  Assessment & Plan:  STEVENSON finalized: 10/8 by LMP and 25week US (5 day difference)  Genetic testing (NIPS-Quad)/CF/AFP:  CF neg prior preg per pt, too late AFP.  Considering NIPT 2023     COVID: Vaccinated.  Recommended bivalent booster.   Tdap:  Recommended  Flu:script 27-36 weeks     ? Desires Sterilization:None     Anatomy US:Growth 95%, 2 weeks ahead of STEVENSON, All anatomy Seen and WNL  FU US:Growth 95%, AC 92%, JANETTE 16cm, FHTS 158, Anterior placenta-grade 1, vertex   FU US: repeat for growth ordered     EPDS-17 at 27 week appointment, counseled on medications but declines at this time. NO thoughts of harming herself or others. Did put 2 on #10 at first and then asked in person and was 0.       PROBLEM LIST/PLAN:   Hx GHTN- baseline labs wnl UPC 0.08  Asthma- inhaler prn  Obesity in preg- pre preg BMI 39,  testing 37 weeks  Patient low spectrum autism  Late PNC- UDS 2023   Hx of anxiety  Hx postpartum depression    Orders:  -     POC Urinalysis Dipstick    2. Obesity in pregnancy, antepartum    3. Late prenatal care    4. History of gestational hypertension    5. Excessive fetal growth affecting management of pregnancy, antepartum, single or unspecified fetus    6. Mild intermittent asthma without complication    7. Autistic disorder on the spectrum- low    8. Anxiety            Counseling:   OB precautions, leaking, VB, kalen gotti vs PTL/Labor  FKC  HTN precautions reviewed: HA, vision change, RUQ/epigastric pain, edema  Round Ligament Pain:  The uterus has several ligaments which provide support and keep the uterus in place. As the  uterus grows these ligaments are pulled and stretched which often causes sharp stabbing like pain in the inguinal area.   You may find a pregnancy support band helpful. Changing positions may also help. Yoga is a great way  to cope with round ligament and low back pain in pregnancy.    Massage may also help with low back pain   Things to Consider at this Point in your Pregnancy:  Some women experience swelling in their feet during pregnancy. Compression stockings may help  Drink plenty of water and stay active   Make sure you are eating frequent small meals, nuts are a wonderful snack to keep with you            Return in about 1 week (around 9/25/2023) for Routine OB visit.      We have gone over prenatal care to include the timing and content of visits. I informed her how to contact the office and/or on call person in the event of any problems and encouraged her to do so when she feels it is necessary.  We then spent time answering her questions which she indicated were answered to her satisfaction.    Amy Carreno DO  9/18/2023 14:11 EDT

## 2023-09-18 ENCOUNTER — HOSPITAL ENCOUNTER (OUTPATIENT)
Dept: LABOR AND DELIVERY | Facility: HOSPITAL | Age: 23
Discharge: HOME | End: 2023-09-18
Payer: COMMERCIAL

## 2023-09-18 ENCOUNTER — ROUTINE PRENATAL (OUTPATIENT)
Dept: OBSTETRICS AND GYNECOLOGY | Facility: CLINIC | Age: 23
End: 2023-09-18
Payer: COMMERCIAL

## 2023-09-18 ENCOUNTER — HOSPITAL ENCOUNTER (OUTPATIENT)
Facility: HOSPITAL | Age: 23
Discharge: HOME | End: 2023-09-18
Attending: OBSTETRICS & GYNECOLOGY | Admitting: OBSTETRICS & GYNECOLOGY
Payer: COMMERCIAL

## 2023-09-18 VITALS
BODY MASS INDEX: 40.75 KG/M2 | OXYGEN SATURATION: 98 % | WEIGHT: 230 LBS | HEIGHT: 63 IN | DIASTOLIC BLOOD PRESSURE: 72 MMHG | RESPIRATION RATE: 16 BRPM | SYSTOLIC BLOOD PRESSURE: 121 MMHG | HEART RATE: 101 BPM | TEMPERATURE: 98.3 F

## 2023-09-18 VITALS — BODY MASS INDEX: 40.74 KG/M2 | WEIGHT: 230 LBS | SYSTOLIC BLOOD PRESSURE: 134 MMHG | DIASTOLIC BLOOD PRESSURE: 82 MMHG

## 2023-09-18 DIAGNOSIS — Z34.80 SUPERVISION OF OTHER NORMAL PREGNANCY, ANTEPARTUM: Primary | ICD-10-CM

## 2023-09-18 DIAGNOSIS — Z87.59 HISTORY OF GESTATIONAL HYPERTENSION: ICD-10-CM

## 2023-09-18 DIAGNOSIS — O36.60X0 EXCESSIVE FETAL GROWTH AFFECTING MANAGEMENT OF PREGNANCY, ANTEPARTUM, SINGLE OR UNSPECIFIED FETUS: ICD-10-CM

## 2023-09-18 DIAGNOSIS — J45.20 MILD INTERMITTENT ASTHMA WITHOUT COMPLICATION: ICD-10-CM

## 2023-09-18 DIAGNOSIS — O09.30 LATE PRENATAL CARE: ICD-10-CM

## 2023-09-18 DIAGNOSIS — F41.9 ANXIETY: ICD-10-CM

## 2023-09-18 DIAGNOSIS — F84.0 AUTISTIC DISORDER: ICD-10-CM

## 2023-09-18 DIAGNOSIS — O99.210 OBESITY IN PREGNANCY, ANTEPARTUM: ICD-10-CM

## 2023-09-18 LAB
GLUCOSE UR STRIP-MCNC: NEGATIVE MG/DL
PROT UR STRIP-MCNC: NEGATIVE MG/DL

## 2023-09-18 PROCEDURE — 87653 STREP B DNA AMP PROBE: CPT | Performed by: OBSTETRICS & GYNECOLOGY

## 2023-09-18 PROCEDURE — 85027 COMPLETE CBC AUTOMATED: CPT | Performed by: OBSTETRICS & GYNECOLOGY

## 2023-09-18 PROCEDURE — 59025 FETAL NON-STRESS TEST: CPT

## 2023-09-18 NOTE — PATIENT INSTRUCTIONS
Venipuncture Blood Specimen Collection  Venipuncture performed in left arm by Skylar Covington with good hemostasis. Patient tolerated the procedure well without complications.   09/18/23   Skylar Covington

## 2023-09-18 NOTE — NON STRESS TEST
"Obstetrical Non-stress Test Interpretation     Name:  Iftikhar Russell  MRN: [id number]    23 y.o. female  at 37w1d    Indication: obesity      Fetal Assessment  Fetal Movement: active  Fetal HR Assessment Method: external  Fetal HR (beats/min): 130  Fetal HR Baseline: normal range  Fetal HR Variability: moderate (amplitude range 6 to 25 bpm)  Fetal HR Accelerations: episodic, periodic, greater than/equal to 15 bpm  Fetal HR Decelerations: absent  Sinusoidal Pattern Present: absent    /72   Pulse 101   Temp 98.3 °F (36.8 °C) (Oral)   Resp 16   Ht 160 cm (63\")   Wt 104 kg (230 lb)   LMP 2023   SpO2 98%   BMI 40.74 kg/m²     Reason for test: Antepartum (NST for obesity)  Date of Test: 2023  Time frame of test: 15:32-16:38  RN NST Interpretation: Radha Smith RN  2023  16:54 EDT  " no

## 2023-09-18 NOTE — NON STRESS TEST
MILY Bull   OB NST Note    2023   Name:  Iftikhar Russell  MRN: [id number]    Subjective:  23 y.o.  at 37w1d    Indication: Obesity in pregnancy    NST:   Baseline: 130  Variability:   Moderate/Normal (amplitude 6-25 bpm)  Accelerations: Present (32 weeks+) 15 x 15 bpm  Decelerations: Absent   Contractions:  Not regular    NST interpretation: reactive    Documented Vitals    23 1538 23 1541 23 1543 23 1546   BP:  132/84  121/72   Pulse: 93 116 87 101   Resp:       Temp:       SpO2: 97%  98%    Weight:       Height:             Lab Results (last 24 hours)       Procedure Component Value Units Date/Time    POC Urinalysis Dipstick [[id number]] Collected: 23 1329    Specimen: Urine Updated: 23 1329     Glucose, UA Negative mg/dL      Protein, POC Negative mg/dL     CBC (No Diff) [[id number]] Collected: 23 1417    Specimen: Blood from Arm, Left Updated: 23 1418    Group B Strep (Molecular) - Swab, Vaginal/Rectum [[id number]] Collected: 23 1432    Specimen: Swab from Vaginal/Rectum Updated: 23 143             Assessment:  23 y.o.  AT 37w1d  Obesity in pregnancy    Plan:   OB Precautions, FKC, Keep scheduled NSTs, Keep office visit      Electronically signed by Jluis Aragon MD, 23, 5:15 PM EDT.

## 2023-09-25 ENCOUNTER — HOSPITAL ENCOUNTER (OUTPATIENT)
Dept: LABOR AND DELIVERY | Facility: HOSPITAL | Age: 23
Discharge: HOME | End: 2023-09-25
Payer: COMMERCIAL

## 2023-09-25 ENCOUNTER — HOSPITAL ENCOUNTER (OUTPATIENT)
Facility: HOSPITAL | Age: 23
Discharge: HOME | End: 2023-09-25
Attending: OBSTETRICS & GYNECOLOGY | Admitting: OBSTETRICS & GYNECOLOGY
Payer: COMMERCIAL

## 2023-09-25 VITALS
SYSTOLIC BLOOD PRESSURE: 124 MMHG | OXYGEN SATURATION: 99 % | RESPIRATION RATE: 20 BRPM | TEMPERATURE: 98.7 F | HEART RATE: 102 BPM | DIASTOLIC BLOOD PRESSURE: 76 MMHG

## 2023-09-25 PROCEDURE — 59025 FETAL NON-STRESS TEST: CPT

## 2023-09-25 NOTE — NON STRESS TEST
Obstetrical Non-stress Test Interpretation     Name:  Iftikhar Russell  MRN: [id number]    23 y.o. female  at 38w1d    Indication: Obesity in Pregnancy      Fetal Assessment  Fetal Movement: active  Fetal HR Assessment Method: external  Fetal HR (beats/min): 140  Fetal HR Baseline: normal range  Fetal HR Variability: moderate (amplitude range 6 to 25 bpm)  Fetal HR Accelerations: greater than/equal to 15 bpm, lasting at least 15 seconds  Fetal HR Decelerations: absent  Sinusoidal Pattern Present: absent    /76 (BP Location: Right arm, Patient Position: Sitting)   Pulse 102   Temp 98.7 °F (37.1 °C) (Oral)   Resp 20   LMP 2023   SpO2 99%     Reason for test: Antepartum (Obesity in pregnancy)  Date of Test: 2023  Time frame of test:   RN NST Interpretation: Radha Chery RN  2023  10:03 EDT

## 2023-09-25 NOTE — NON STRESS TEST
Obstetrical Non-stress Test Interpretation     Name:  Iftikhar Russell  MRN: [id number]    23 y.o. female  at 38w1d    Indication: EGA 38w 1d; BMI 40.7; class III obesity, scheduled NST       Baseline: Normal 110-160 bpm  Variability:   Moderate/Normal (amplitude 6-25 bpm)  Accelerations: Present (32 weeks+) 15 x 15 bpm  Decelerations: Absent   Contractions:  Absent       Impression:  Category I       Julieta Bergeron MD  2023  15:36 EDT

## 2023-09-26 NOTE — ASSESSMENT & PLAN NOTE
STEVENSON finalized: 10/8 by LMP and 25week US (5 day difference)  Genetic testing (NIPS-Quad)/CF/AFP:  CF neg prior preg per pt, too late AFP.  Considering NIPT 2023      COVID: Vaccinated.  Recommended bivalent booster.   Tdap:  Recommended  Flu:script 27-36 weeks      ? Desires Sterilization:None      Anatomy US:Growth 95%, 2 weeks ahead of STEVENSON, All anatomy Seen and WNL  FU US:Growth 95%, AC 92%, JANETTE 16cm, FHTS 158, Anterior placenta-grade 1, vertex   FU US: Growth 87%, AC 89%, EFW [id number],JANETTE 11cm, FHTS 131, Anterior placenta-grade 1, cephalic, vertex      EPDS-17 at 27 week appointment, counseled on medications but declines at this time. NO thoughts of harming herself or others. Did put 2 on #10 at first and then asked in person and was 0.         PROBLEM LIST/PLAN:   Hx GHTN- baseline labs wnl UPC 0.08  Asthma- inhaler prn  Obesity in preg- pre preg BMI 39,  testing 37 weeks  Patient low spectrum autism  Late PNC- UDS 2023   Hx of anxiety  Hx postpartum depression  IOL 10/5 @ 0600

## 2023-09-26 NOTE — PROGRESS NOTES
Routine Prenatal Visit     Subjective  Iftikhar Russell is a 23 y.o.  at 38w3d here for her routine OB visit.   She is taking her prenatal vitamins.Reports no loss of fluid or vaginal bleeding. Patient doing well without any complaints. Pregnancy complicated by:     Patient Active Problem List   Diagnosis    Supervision of other normal pregnancy, antepartum    Anxiety    History of gestational hypertension    Obesity in pregnancy, antepartum    Autistic disorder on the spectrum- low    Late prenatal care    Mild intermittent asthma without complication    Fall    Excessive fetal growth affecting management of pregnancy, antepartum    Nausea and vomiting in pregnancy         OB History    Para Term  AB Living   3 2 2 0   2   SAB IAB Ectopic Molar Multiple Live Births           0 2      # Outcome Date GA Lbr Brandon/2nd Weight Sex Delivery Anes PTL Lv   3 Current            2 Term 22 39w4d / 00:15 3320 g (7 lb 5.1 oz) M Vag-Spont EPI N HERLINDA   1 Term 21 37w3d  3289 g (7 lb 4 oz) M Vag-Spont EPI  HERLINDA           ROS:   General ROS: negative for - chills or fatigue  Respiratory ROS: negative for - cough or hemoptysis  Cardiovascular ROS: negative for - chest pain or dyspnea on exertion  Genito-Urinary ROS: negative for  change in urinary stream, vaginal discharge   Musculoskeletal ROS: negative for - gait disturbance or joint pain  Dermatological ROS: negative for acne,  dry skin or itching    Objective  Physical Exam:   Vitals:    23 0832   BP: 115/79       Uterine Size: size equals dates  FHT: 110-160 BPM    General appearance - alert, well appearing, and in no distress  Mental status - alert, oriented to person, place, and time  Abdomen- Soft, Gravid uterus, non-tender to palpation  Musculoskeletal: negative for - gait disturbance or joint pain  Extremeties: negative swelling or cyanosis   Dermatological: negative rashes or skin lesions   Pelvic exam : 3/70/-3, ROM check  performed-NEG pool, NEG valsalva, NEG nitrazine    Assessment/Plan:   Diagnoses and all orders for this visit:    1. Supervision of other normal pregnancy, antepartum (Primary)  Assessment & Plan:  STEVENSON finalized: 10/8 by LMP and 25week US (5 day difference)  Genetic testing (NIPS-Quad)/CF/AFP:  CF neg prior preg per pt, too late AFP.  Considering NIPT 2023      COVID: Vaccinated.  Recommended bivalent booster.   Tdap:  Recommended  Flu:script 27-36 weeks      ? Desires Sterilization:None      Anatomy US:Growth 95%, 2 weeks ahead of STEVENSON, All anatomy Seen and WNL  FU US:Growth 95%, AC 92%, JANETTE 16cm, FHTS 158, Anterior placenta-grade 1, vertex   FU US: Growth 87%, AC 89%, EFW [id number],JANETTE 11cm, FHTS 131, Anterior placenta-grade 1, cephalic, vertex      EPDS-17 at 27 week appointment, counseled on medications but declines at this time. NO thoughts of harming herself or others. Did put 2 on #10 at first and then asked in person and was 0.         PROBLEM LIST/PLAN:   Hx GHTN- baseline labs wnl UPC 0.08  Asthma- inhaler prn  Obesity in preg- pre preg BMI 39,  testing 37 weeks  Patient low spectrum autism  Late PNC- UDS 2023   Hx of anxiety  Hx postpartum depression  IOL 10/5 @ 0600    Orders:  -     POC Urinalysis Dipstick    2. History of gestational hypertension  -     POC Urinalysis Dipstick    3. Obesity in pregnancy, antepartum  -     POC Urinalysis Dipstick    4. Late prenatal care  -     POC Urinalysis Dipstick    5. Anxiety    6. Mild intermittent asthma without complication            Counseling:   OB precautions, leaking, VB, kalen gotti vs PTL/Labor  FK  Round Ligament Pain:  The uterus has several ligaments which provide support and keep the uterus in place. As the  uterus grows these ligaments are pulled and stretched which often causes sharp stabbing like pain in the inguinal area.   You may find a pregnancy support band helpful. Changing positions may also help. Yoga is a great way to  cope with round ligament and low back pain in pregnancy.    Massage may also help with low back pain   Things to Consider at this Point in your Pregnancy:  Some women experience swelling in their feet during pregnancy. Compression stockings may help  Drink plenty of water and stay active   Make sure you are eating frequent small meals, nuts are a wonderful snack to keep with you            Return in about 1 week (around 10/4/2023) for Routine OB visit.      We have gone over prenatal care to include the timing and content of visits. I informed her how to contact the office and/or on call person in the event of any problems and encouraged her to do so when she feels it is necessary.  We then spent time answering her questions which she indicated were answered to her satisfaction.    Mena Gallegos DO  9/27/2023 08:59 EDT

## 2023-09-27 ENCOUNTER — ROUTINE PRENATAL (OUTPATIENT)
Dept: OBSTETRICS AND GYNECOLOGY | Facility: CLINIC | Age: 23
End: 2023-09-27
Payer: COMMERCIAL

## 2023-09-27 VITALS — SYSTOLIC BLOOD PRESSURE: 115 MMHG | WEIGHT: 232.4 LBS | DIASTOLIC BLOOD PRESSURE: 79 MMHG | BODY MASS INDEX: 41.17 KG/M2

## 2023-09-27 DIAGNOSIS — O99.210 OBESITY IN PREGNANCY, ANTEPARTUM: ICD-10-CM

## 2023-09-27 DIAGNOSIS — J45.20 MILD INTERMITTENT ASTHMA WITHOUT COMPLICATION: ICD-10-CM

## 2023-09-27 DIAGNOSIS — O09.30 LATE PRENATAL CARE: ICD-10-CM

## 2023-09-27 DIAGNOSIS — Z34.80 SUPERVISION OF OTHER NORMAL PREGNANCY, ANTEPARTUM: Primary | ICD-10-CM

## 2023-09-27 DIAGNOSIS — Z87.59 HISTORY OF GESTATIONAL HYPERTENSION: ICD-10-CM

## 2023-09-27 DIAGNOSIS — F41.9 ANXIETY: ICD-10-CM

## 2023-09-27 LAB
GLUCOSE UR STRIP-MCNC: NEGATIVE MG/DL
PROT UR STRIP-MCNC: NEGATIVE MG/DL

## 2023-10-02 ENCOUNTER — HOSPITAL ENCOUNTER (OUTPATIENT)
Facility: HOSPITAL | Age: 23
Discharge: HOME | End: 2023-10-02
Attending: OBSTETRICS & GYNECOLOGY | Admitting: OBSTETRICS & GYNECOLOGY
Payer: COMMERCIAL

## 2023-10-02 ENCOUNTER — HOSPITAL ENCOUNTER (OUTPATIENT)
Dept: LABOR AND DELIVERY | Facility: HOSPITAL | Age: 23
Discharge: HOME | End: 2023-10-02
Payer: COMMERCIAL

## 2023-10-02 VITALS — SYSTOLIC BLOOD PRESSURE: 120 MMHG | RESPIRATION RATE: 18 BRPM | HEART RATE: 86 BPM | DIASTOLIC BLOOD PRESSURE: 60 MMHG

## 2023-10-02 PROCEDURE — 59025 FETAL NON-STRESS TEST: CPT

## 2023-10-02 PROCEDURE — 59025 FETAL NON-STRESS TEST: CPT | Performed by: OBSTETRICS & GYNECOLOGY

## 2023-10-02 NOTE — NON STRESS TEST
Obstetrical Non-stress Test Interpretation     Name:  Iftikhar Russell  MRN: [id number]    23 y.o. female  at 39w1d    Indication: obesity      Fetal Assessment  Fetal Movement: active  Fetal HR Assessment Method: external  Fetal HR (beats/min): 145  Fetal HR Baseline: normal range  Fetal HR Variability: moderate (amplitude range 6 to 25 bpm)  Fetal HR Accelerations: greater than/equal to 15 bpm, lasting at least 15 seconds  Fetal HR Decelerations: absent  Sinusoidal Pattern Present: absent    /60   Pulse 86   Resp 18   LMP 2023   Patient Vitals for the past 24 hrs:   BP Pulse Resp   10/02/23 0944 120/60 86 --   10/02/23 0941 135/81 109 18        Reason for test: Other (Comment) (Obesity)  Date of Test: 10/2/2023  Time frame of test:   RN NST Interpretation: Reactive      Lamar Delgado RN  10/2/2023  09:59 EDT

## 2023-10-04 ENCOUNTER — ANESTHESIA (OUTPATIENT)
Dept: LABOR AND DELIVERY | Facility: HOSPITAL | Age: 23
End: 2023-10-04
Payer: COMMERCIAL

## 2023-10-04 ENCOUNTER — HOSPITAL ENCOUNTER (INPATIENT)
Facility: HOSPITAL | Age: 23
LOS: 2 days | Discharge: HOME | End: 2023-10-06
Attending: OBSTETRICS & GYNECOLOGY | Admitting: OBSTETRICS & GYNECOLOGY
Payer: COMMERCIAL

## 2023-10-04 ENCOUNTER — ANESTHESIA EVENT (OUTPATIENT)
Dept: LABOR AND DELIVERY | Facility: HOSPITAL | Age: 23
End: 2023-10-04
Payer: COMMERCIAL

## 2023-10-04 PROBLEM — O42.92 FULL-TERM PREMATURE RUPTURE OF MEMBRANES: Status: ACTIVE | Noted: 2023-10-04

## 2023-10-04 LAB
ABO GROUP BLD: NORMAL
BLD GP AB SCN SERPL QL: NEGATIVE
DEPRECATED RDW RBC AUTO: 50.5 FL (ref 37–54)
ERYTHROCYTE [DISTWIDTH] IN BLOOD BY AUTOMATED COUNT: 16.2 % (ref 12.3–15.4)
HCT VFR BLD AUTO: 38.4 % (ref 34–46.6)
HGB BLD-MCNC: 12.1 G/DL (ref 12–15.9)
MCH RBC QN AUTO: 27.3 PG (ref 26.6–33)
MCHC RBC AUTO-ENTMCNC: 31.5 G/DL (ref 31.5–35.7)
MCV RBC AUTO: 86.7 FL (ref 79–97)
PLATELET # BLD AUTO: 205 10*3/MM3 (ref 140–450)
PMV BLD AUTO: 12.3 FL (ref 6–12)
RBC # BLD AUTO: 4.43 10*6/MM3 (ref 3.77–5.28)
RH BLD: POSITIVE
T&S EXPIRATION DATE: NORMAL
WBC NRBC COR # BLD: 6.7 10*3/MM3 (ref 3.4–10.8)

## 2023-10-04 PROCEDURE — 86901 BLOOD TYPING SEROLOGIC RH(D): CPT | Performed by: OBSTETRICS & GYNECOLOGY

## 2023-10-04 PROCEDURE — 25010000002 FENTANYL CITRATE (PF) 50 MCG/ML SOLUTION: Performed by: ANESTHESIOLOGY

## 2023-10-04 PROCEDURE — C1755 CATHETER, INTRASPINAL: HCPCS | Performed by: ANESTHESIOLOGY

## 2023-10-04 PROCEDURE — 86850 RBC ANTIBODY SCREEN: CPT | Performed by: OBSTETRICS & GYNECOLOGY

## 2023-10-04 PROCEDURE — 51702 INSERT TEMP BLADDER CATH: CPT

## 2023-10-04 PROCEDURE — 85027 COMPLETE CBC AUTOMATED: CPT | Performed by: OBSTETRICS & GYNECOLOGY

## 2023-10-04 PROCEDURE — 25010000002 ONDANSETRON PER 1 MG: Performed by: OBSTETRICS & GYNECOLOGY

## 2023-10-04 PROCEDURE — 0KQM0ZZ REPAIR PERINEUM MUSCLE, OPEN APPROACH: ICD-10-PCS | Performed by: OBSTETRICS & GYNECOLOGY

## 2023-10-04 PROCEDURE — 86900 BLOOD TYPING SEROLOGIC ABO: CPT | Performed by: OBSTETRICS & GYNECOLOGY

## 2023-10-04 PROCEDURE — 25810000003 LACTATED RINGERS PER 1000 ML: Performed by: OBSTETRICS & GYNECOLOGY

## 2023-10-04 RX ORDER — ENOXAPARIN SODIUM 100 MG/ML
40 INJECTION SUBCUTANEOUS EVERY 24 HOURS
Status: DISCONTINUED | OUTPATIENT
Start: 2023-10-05 | End: 2023-10-06 | Stop reason: HOSPADM

## 2023-10-04 RX ORDER — DOCUSATE SODIUM 100 MG/1
100 CAPSULE, LIQUID FILLED ORAL 2 TIMES DAILY
Status: DISCONTINUED | OUTPATIENT
Start: 2023-10-04 | End: 2023-10-06 | Stop reason: HOSPADM

## 2023-10-04 RX ORDER — ACETAMINOPHEN 325 MG/1
650 TABLET ORAL EVERY 4 HOURS PRN
Status: DISCONTINUED | OUTPATIENT
Start: 2023-10-04 | End: 2023-10-04 | Stop reason: HOSPADM

## 2023-10-04 RX ORDER — FAMOTIDINE 20 MG/1
20 TABLET, FILM COATED ORAL ONCE AS NEEDED
Status: DISCONTINUED | OUTPATIENT
Start: 2023-10-04 | End: 2023-10-04 | Stop reason: HOSPADM

## 2023-10-04 RX ORDER — TERBUTALINE SULFATE 1 MG/ML
0.25 INJECTION, SOLUTION SUBCUTANEOUS AS NEEDED
Status: DISCONTINUED | OUTPATIENT
Start: 2023-10-04 | End: 2023-10-04 | Stop reason: HOSPADM

## 2023-10-04 RX ORDER — ONDANSETRON 2 MG/ML
4 INJECTION INTRAMUSCULAR; INTRAVENOUS EVERY 6 HOURS PRN
Status: DISCONTINUED | OUTPATIENT
Start: 2023-10-04 | End: 2023-10-04 | Stop reason: HOSPADM

## 2023-10-04 RX ORDER — OXYTOCIN/0.9 % SODIUM CHLORIDE 30/500 ML
1 PLASTIC BAG, INJECTION (ML) INTRAVENOUS
Status: DISCONTINUED | OUTPATIENT
Start: 2023-10-04 | End: 2023-10-06 | Stop reason: HOSPADM

## 2023-10-04 RX ORDER — ONDANSETRON 4 MG/1
4 TABLET, FILM COATED ORAL EVERY 6 HOURS PRN
Status: DISCONTINUED | OUTPATIENT
Start: 2023-10-04 | End: 2023-10-04 | Stop reason: HOSPADM

## 2023-10-04 RX ORDER — EPHEDRINE SULFATE 50 MG/ML
5 INJECTION, SOLUTION INTRAVENOUS
Status: DISCONTINUED | OUTPATIENT
Start: 2023-10-04 | End: 2023-10-04 | Stop reason: HOSPADM

## 2023-10-04 RX ORDER — MAGNESIUM CARB/ALUMINUM HYDROX 105-160MG
30 TABLET,CHEWABLE ORAL ONCE
Status: DISCONTINUED | OUTPATIENT
Start: 2023-10-04 | End: 2023-10-04 | Stop reason: HOSPADM

## 2023-10-04 RX ORDER — IBUPROFEN 600 MG/1
600 TABLET ORAL EVERY 6 HOURS
Status: DISCONTINUED | OUTPATIENT
Start: 2023-10-04 | End: 2023-10-04 | Stop reason: HOSPADM

## 2023-10-04 RX ORDER — LIDOCAINE HYDROCHLORIDE 10 MG/ML
0.5 INJECTION, SOLUTION INFILTRATION; PERINEURAL ONCE AS NEEDED
Status: DISCONTINUED | OUTPATIENT
Start: 2023-10-04 | End: 2023-10-04 | Stop reason: HOSPADM

## 2023-10-04 RX ORDER — SODIUM CHLORIDE 9 MG/ML
40 INJECTION, SOLUTION INTRAVENOUS AS NEEDED
Status: DISCONTINUED | OUTPATIENT
Start: 2023-10-04 | End: 2023-10-04 | Stop reason: HOSPADM

## 2023-10-04 RX ORDER — BISACODYL 10 MG
10 SUPPOSITORY, RECTAL RECTAL DAILY PRN
Status: DISCONTINUED | OUTPATIENT
Start: 2023-10-05 | End: 2023-10-06 | Stop reason: HOSPADM

## 2023-10-04 RX ORDER — FENTANYL CITRATE 50 UG/ML
INJECTION, SOLUTION INTRAMUSCULAR; INTRAVENOUS
Status: COMPLETED | OUTPATIENT
Start: 2023-10-04 | End: 2023-10-04

## 2023-10-04 RX ORDER — HYDROCODONE BITARTRATE AND ACETAMINOPHEN 5; 325 MG/1; MG/1
1 TABLET ORAL EVERY 4 HOURS PRN
Status: DISCONTINUED | OUTPATIENT
Start: 2023-10-04 | End: 2023-10-06 | Stop reason: HOSPADM

## 2023-10-04 RX ORDER — OXYTOCIN/0.9 % SODIUM CHLORIDE 30/500 ML
250 PLASTIC BAG, INJECTION (ML) INTRAVENOUS CONTINUOUS
Status: ACTIVE | OUTPATIENT
Start: 2023-10-04 | End: 2023-10-04

## 2023-10-04 RX ORDER — HYDROCODONE BITARTRATE AND ACETAMINOPHEN 10; 325 MG/1; MG/1
1 TABLET ORAL EVERY 4 HOURS PRN
Status: DISCONTINUED | OUTPATIENT
Start: 2023-10-04 | End: 2023-10-06 | Stop reason: HOSPADM

## 2023-10-04 RX ORDER — FENTANYL CITRATE 50 UG/ML
INJECTION, SOLUTION INTRAMUSCULAR; INTRAVENOUS
Status: COMPLETED
Start: 2023-10-04 | End: 2023-10-04

## 2023-10-04 RX ORDER — OXYTOCIN/0.9 % SODIUM CHLORIDE 30/500 ML
999 PLASTIC BAG, INJECTION (ML) INTRAVENOUS ONCE
Status: COMPLETED | OUTPATIENT
Start: 2023-10-04 | End: 2023-10-04

## 2023-10-04 RX ORDER — CALCIUM CARBONATE 500 MG/1
2 TABLET, CHEWABLE ORAL 3 TIMES DAILY PRN
Status: DISCONTINUED | OUTPATIENT
Start: 2023-10-04 | End: 2023-10-06 | Stop reason: HOSPADM

## 2023-10-04 RX ORDER — IBUPROFEN 600 MG/1
600 TABLET ORAL EVERY 6 HOURS PRN
Status: DISCONTINUED | OUTPATIENT
Start: 2023-10-04 | End: 2023-10-06 | Stop reason: HOSPADM

## 2023-10-04 RX ORDER — SODIUM CHLORIDE, SODIUM LACTATE, POTASSIUM CHLORIDE, CALCIUM CHLORIDE 600; 310; 30; 20 MG/100ML; MG/100ML; MG/100ML; MG/100ML
125 INJECTION, SOLUTION INTRAVENOUS CONTINUOUS
Status: DISCONTINUED | OUTPATIENT
Start: 2023-10-04 | End: 2023-10-06 | Stop reason: HOSPADM

## 2023-10-04 RX ORDER — SODIUM CHLORIDE 0.9 % (FLUSH) 0.9 %
10 SYRINGE (ML) INJECTION EVERY 12 HOURS SCHEDULED
Status: DISCONTINUED | OUTPATIENT
Start: 2023-10-04 | End: 2023-10-04 | Stop reason: HOSPADM

## 2023-10-04 RX ORDER — ACETAMINOPHEN 325 MG/1
650 TABLET ORAL EVERY 6 HOURS
Status: DISCONTINUED | OUTPATIENT
Start: 2023-10-04 | End: 2023-10-04 | Stop reason: HOSPADM

## 2023-10-04 RX ORDER — SODIUM CHLORIDE 0.9 % (FLUSH) 0.9 %
10 SYRINGE (ML) INJECTION AS NEEDED
Status: DISCONTINUED | OUTPATIENT
Start: 2023-10-04 | End: 2023-10-04 | Stop reason: HOSPADM

## 2023-10-04 RX ORDER — SODIUM CHLORIDE 0.9 % (FLUSH) 0.9 %
1-10 SYRINGE (ML) INJECTION AS NEEDED
Status: DISCONTINUED | OUTPATIENT
Start: 2023-10-04 | End: 2023-10-06 | Stop reason: HOSPADM

## 2023-10-04 RX ORDER — ACETAMINOPHEN 325 MG/1
650 TABLET ORAL EVERY 6 HOURS PRN
Status: DISCONTINUED | OUTPATIENT
Start: 2023-10-04 | End: 2023-10-06 | Stop reason: HOSPADM

## 2023-10-04 RX ORDER — MISOPROSTOL 200 UG/1
600 TABLET ORAL ONCE
Status: DISCONTINUED | OUTPATIENT
Start: 2023-10-04 | End: 2023-10-04 | Stop reason: HOSPADM

## 2023-10-04 RX ORDER — LIDOCAINE HYDROCHLORIDE AND EPINEPHRINE 15; 5 MG/ML; UG/ML
INJECTION, SOLUTION EPIDURAL
Status: COMPLETED | OUTPATIENT
Start: 2023-10-04 | End: 2023-10-04

## 2023-10-04 RX ORDER — OXYTOCIN/0.9 % SODIUM CHLORIDE 30/500 ML
125 PLASTIC BAG, INJECTION (ML) INTRAVENOUS CONTINUOUS PRN
Status: DISCONTINUED | OUTPATIENT
Start: 2023-10-04 | End: 2023-10-06 | Stop reason: HOSPADM

## 2023-10-04 RX ORDER — FAMOTIDINE 10 MG/ML
20 INJECTION, SOLUTION INTRAVENOUS ONCE AS NEEDED
Status: DISCONTINUED | OUTPATIENT
Start: 2023-10-04 | End: 2023-10-04 | Stop reason: HOSPADM

## 2023-10-04 RX ADMIN — SODIUM CHLORIDE, POTASSIUM CHLORIDE, SODIUM LACTATE AND CALCIUM CHLORIDE 125 ML/HR: 600; 310; 30; 20 INJECTION, SOLUTION INTRAVENOUS at 07:10

## 2023-10-04 RX ADMIN — FENTANYL CITRATE 100 MCG: 50 INJECTION, SOLUTION INTRAMUSCULAR; INTRAVENOUS at 09:24

## 2023-10-04 RX ADMIN — ONDANSETRON 4 MG: 2 INJECTION INTRAMUSCULAR; INTRAVENOUS at 10:13

## 2023-10-04 RX ADMIN — IBUPROFEN 600 MG: 600 TABLET, FILM COATED ORAL at 19:38

## 2023-10-04 RX ADMIN — Medication 250 ML/HR: at 18:45

## 2023-10-04 RX ADMIN — Medication 10 ML/HR: at 17:09

## 2023-10-04 RX ADMIN — LIDOCAINE HYDROCHLORIDE AND EPINEPHRINE 2 ML: 15; 5 INJECTION, SOLUTION EPIDURAL at 09:31

## 2023-10-04 RX ADMIN — SODIUM CHLORIDE, POTASSIUM CHLORIDE, SODIUM LACTATE AND CALCIUM CHLORIDE 125 ML/HR: 600; 310; 30; 20 INJECTION, SOLUTION INTRAVENOUS at 05:36

## 2023-10-04 RX ADMIN — Medication 999 ML/HR: at 18:22

## 2023-10-04 RX ADMIN — LIDOCAINE HYDROCHLORIDE AND EPINEPHRINE 3 ML: 15; 5 INJECTION, SOLUTION EPIDURAL at 09:24

## 2023-10-04 RX ADMIN — ACETAMINOPHEN 650 MG: 325 TABLET ORAL at 20:05

## 2023-10-04 RX ADMIN — EPHEDRINE SULFATE 5 MG: 50 INJECTION INTRAVENOUS at 10:48

## 2023-10-04 RX ADMIN — Medication 10 ML/HR: at 09:33

## 2023-10-04 RX ADMIN — Medication 2 MILLI-UNITS/MIN: at 07:54

## 2023-10-04 NOTE — ANESTHESIA PREPROCEDURE EVALUATION
Anesthesia Evaluation     Patient summary reviewed and Nursing notes reviewed   no history of anesthetic complications:   NPO Solid Status: > 8 hours  NPO Liquid Status: > 2 hours           Airway   Mallampati: II  TM distance: >3 FB  Neck ROM: full  No difficulty expected  Dental      Pulmonary - normal exam    breath sounds clear to auscultation  (+) asthma,  Cardiovascular - negative cardio ROS and normal exam  Exercise tolerance: good (4-7 METS)    Rhythm: regular  Rate: normal        Neuro/Psych- negative ROS  GI/Hepatic/Renal/Endo    (+) GERD well controlled    Musculoskeletal (-) negative ROS    Abdominal    Substance History - negative use     OB/GYN    (+) Pregnant        Other - negative ROS       ROS/Med Hx Other:                  Anesthesia Plan    ASA 2     general     (Patient understands anesthesia not responsible for dental damage.)  intravenous induction     Anesthetic plan, risks, benefits, and alternatives have been provided, discussed and informed consent has been obtained with: patient.    Use of blood products discussed with patient .    Plan discussed with CRNA.    CODE STATUS:    Level Of Support Discussed With: Patient  Code Status (Patient has no pulse and is not breathing): CPR (Attempt to Resuscitate)  Medical Interventions (Patient has pulse or is breathing): Full Support

## 2023-10-04 NOTE — NURSING NOTE
While obtaining informed consent, RN informed patient Dr. Bergeron [place] the covering [place] 0900 [place] [facility] [place] stated she would not consent to Dr. Bergeron treating her. Informed covering hospitalist of patient refusal.

## 2023-10-04 NOTE — PLAN OF CARE
Problem: Adult Inpatient Plan of Care  Goal: Plan of Care Review  Outcome: Ongoing, Progressing  Goal: Patient-Specific Goal (Individualized)  Outcome: Ongoing, Progressing  Goal: Absence of Hospital-Acquired Illness or Injury  Outcome: Ongoing, Progressing  Goal: Optimal Comfort and Wellbeing  Outcome: Ongoing, Progressing  Goal: Readiness for Transition of Care  Outcome: Ongoing, Progressing     Problem: Bleeding (Labor)  Goal: Hemostasis  Outcome: Ongoing, Progressing     Problem: Change in Fetal Wellbeing (Labor)  Goal: Stable Fetal Wellbeing  Outcome: Ongoing, Progressing     Problem: Delayed Labor Progression (Labor)  Goal: Effective Progression to Delivery  Outcome: Ongoing, Progressing     Problem: Infection (Labor)  Goal: Absence of Infection Signs and Symptoms  Outcome: Ongoing, Progressing     Problem: Labor Pain (Labor)  Goal: Acceptable Pain Control  Outcome: Ongoing, Progressing     Problem: Uterine Tachysystole (Labor)  Goal: Normal Uterine Contraction Pattern  Outcome: Ongoing, Progressing   Goal Outcome Evaluation:

## 2023-10-04 NOTE — PROGRESS NOTES
"[facility]  Obstetric Progress Note    Subjective     Patient:    The patient comf with E      Objective     Vital Signs Range for the last 24 hours  Temp:  [97.9 °F (36.6 °C)-98.8 °F (37.1 °C)] 98 °F (36.7 °C)   Temp src: Oral   BP: ()/(43-80) 97/57   Heart Rate:  [] 78   Resp:  [18-22] 22        Flowsheet Rows      Flowsheet Row First Filed Value   Admission Height 160 cm (63\") Documented at 10/04/2023 0445   Admission Weight 104 kg (230 lb) Documented at 10/04/2023 0445            No intake or output data in the 24 hours ending 10/04/23 1531    No intake/output data recorded.    Physical Exam:  General: Patient is well appearing         Cervix: C/7/0    Dilation:    Effacement:    Station:      Fetal Heart Rate Assessment   Method: Fetal HR Assessment Method: external   Beats/min: Fetal HR (beats/min): 140   Baseline: Fetal HR Baseline: normal range   Variability: Fetal HR Variability: moderate (amplitude range 6 to 25 bpm)   Accels: Fetal HR Accelerations: greater than/equal to 15 bpm, lasting at least 15 seconds   Decels: Fetal HR Decelerations: absent     Uterine Assessment   Method: Method: external tocotransducer, palpation   Frequency (min): Contraction Frequency (Minutes): 2-4   Ctx Count in 10 min:     Duration:     Intensity: Contraction Intensity: strong by palpation   Intensity by IUPC:     Resting Tone: Uterine Resting Tone: soft by palpation   Resting Tone by IUPC:     Irving Units:         Assessment & Plan       Full-term premature rupture of membranes        Assessment:  1.  Intrauterine pregnancy at 39w3d gestation with reactive fetal status.        Plan:  1. Continue Pitocin -   2. Plan of care has been reviewed with patient   3.  All questions have been answered.      Electronically signed by Tatiana Johns MD, 10/04/23, 3:31 PM EDT.     "

## 2023-10-04 NOTE — H&P
[facility]  Obstetric History and Physical    Chief Complaint   Patient presents with    Leaking Fluid       Subjective     Patient is a 23 y.o. female  currently at 39w3d, who presents with complaint of leakage of fluid starting around 3:00 in the morning.  Positive fetal movement.  No vaginal bleeding.  No fever or chills.  No headache visual disturbance right upper quadrant pain.    Her prenatal care is benign.  Her previous obstetric/gynecological history is noted for is non-contributory.    The following portions of the patients history were reviewed and updated as appropriate: current medications, allergies, past medical history, past surgical history, past family history, past social history, and problem list .       Prenatal Information:  Prenatal Results       Initial Prenatal Labs       Test Value Reference Range Date Time    Hemoglobin ^ 13.6 g/dL 12.0 - 16.0 23 1323       13.1 g/dL 12.0 - 15.9 23 0111    Hematocrit ^ 42.6 % 36.0 - 46.0 23 1323       40.0 % 34.0 - 46.6 23 0111    Platelets ^ 232 10*3/uL 140 - 440 23 1323       304 10*3/mm3 140 - 450 23 0111    Rubella IgG  1.04 index Immune >0.99 23 1041    Hepatitis B SAg  Non-Reactive  Non-Reactive 23 1041    Hepatitis C Ab  Non-Reactive  Non-Reactive 23 1041    RPR  Non-Reactive  Non-Reactive 21 1413    T. Pallidum Ab   Non-Reactive  Non-Reactive 23 1041    ABO  O   23 1825    Rh  Positive   23 1825    Antibody Screen        HIV  Non-Reactive  Non-Reactive 23 1041    Urine Culture  No growth   23 0947    Gonorrhea  Not Detected  Not Detected  23 0947    Chlamydia  Not Detected  Not Detected  23 0947    TSH  1.800 uIU/mL 0.270 - 4.200 21 1413    HgB A1c         Varicella IgG        HgB Electrophoresis         Cystic fibrosis                   Fetal testing        Test Value Reference Range Date Time    NIPT        MSAFP        AFP-4                   2nd and 3rd Trimester       Test Value Reference Range Date Time    Hemoglobin (repeated)  12.1 g/dL 12.0 - 15.9 10/04/23 0531       12.1 g/dL 12.0 - 15.9 09/18/23 1417       11.6 g/dL 12.0 - 15.9 08/11/23 1815       12.0 g/dL 12.0 - 15.9 07/30/23 1835       12.5 g/dL 12.0 - 15.9 06/29/23 1153       13.0 g/dL 12.0 - 15.9 06/13/23 1041    Hematocrit (repeated)  38.4 % 34.0 - 46.6 10/04/23 0531       37.1 % 34.0 - 46.6 09/18/23 1417       35.2 % 34.0 - 46.6 08/11/23 1815       36.5 % 34.0 - 46.6 07/30/23 1835       37.8 % 34.0 - 46.6 06/29/23 1153       38.1 % 34.0 - 46.6 06/13/23 1041    Platelets   205 10*3/mm3 140 - 450 10/04/23 0531       205 10*3/mm3 140 - 450 09/18/23 1417       200 10*3/mm3 140 - 450 08/11/23 1815       213 10*3/mm3 140 - 450 07/30/23 1835       211 10*3/mm3 140 - 450 06/29/23 1153       225 10*3/mm3 140 - 450 06/13/23 1041      ^ 232 10*3/uL 140 - 440 03/23/23 1323       304 10*3/mm3 140 - 450 01/17/23 0111    GCT  126 mg/dL 65 - 139 06/29/23 1153    Antibody Screen (repeated)  Negative   07/30/23 1825       Negative   06/13/23 1041    GTT Fasting        GTT 1 Hr        GTT 2 Hr        GTT 3 Hr        Group B Strep  Negative  Negative 09/18/23 1432              Other testing        Test Value Reference Range Date Time    Parvo IgG         CMV IgG                   Drug Screening       Test Value Reference Range Date Time    Amphetamine Screen        Barbiturate Screen  Negative  Negative 06/13/23 0947    Benzodiazepine Screen  Negative  Negative 06/13/23 0947    Methadone Screen  Negative  Negative 06/13/23 0947    Phencyclidine Screen        Opiates Screen  Negative  Negative 06/13/23 0947    THC Screen  Negative  Negative 06/13/23 0947    Cocaine Screen  Negative  Negative 06/13/23 0947    Propoxyphene Screen        Buprenorphine Screen        Methamphetamine Screen        Oxycodone Screen  Negative  Negative 06/13/23 0947    Tricyclic Antidepressants Screen                  Legend     ^: Historical                          External Prenatal Results       Pregnancy Outside Results - Transcribed From Office Records - See Scanned Records For Details       Test Value Date Time    ABO  O  07/30/23 1825    Rh  Positive  07/30/23 1825    Antibody Screen  Negative  07/30/23 1825       Negative  06/13/23 1041    Varicella IgG  465 index 10/08/21 1102    Rubella  1.04 index 06/13/23 1041    Hgb  12.1 g/dL 10/04/23 0531       12.1 g/dL 09/18/23 1417       11.6 g/dL 08/11/23 1815       12.0 g/dL 07/30/23 1835       12.5 g/dL 06/29/23 1153       13.0 g/dL 06/13/23 1041      ^ 13.6 g/dL 03/23/23 1323       13.1 g/dL 01/17/23 0111    Hct  38.4 % 10/04/23 0531       37.1 % 09/18/23 1417       35.2 % 08/11/23 1815       36.5 % 07/30/23 1835       37.8 % 06/29/23 1153       38.1 % 06/13/23 1041      ^ 42.6 % 03/23/23 1323       40.0 % 01/17/23 0111    Glucose Fasting GTT       Glucose Tolerance Test 1 hour       Glucose Tolerance Test 3 hour       Gonorrhea (discrete)  Not Detected  06/13/23 0947    Chlamydia (discrete)  Not Detected  06/13/23 0947    RPR  Non-Reactive  12/20/21 1413    VDRL       Syphilis Antibody       HBsAg  Non-Reactive  06/13/23 1041    Herpes Simplex Virus PCR       Herpes Simplex VIrus Culture       HIV  Non-Reactive  06/13/23 1041    Hep C RNA Quant PCR       Hep C Antibody  Non-Reactive  06/13/23 1041    AFP       Group B Strep  Negative  09/18/23 1432    GBS Susceptibility to Clindamycin       GBS Susceptibility to Erythromycin       Fetal Fibronectin       Genetic Testing, Maternal Blood                 Drug Screening       Test Value Date Time    Urine Drug Screen       Amphetamine Screen       Barbiturate Screen  Negative  06/13/23 0947    Benzodiazepine Screen  Negative  06/13/23 0947    Methadone Screen  Negative  06/13/23 0947    Phencyclidine Screen       Opiates Screen  Negative  06/13/23 0947    THC Screen  Negative  06/13/23 0947    Cocaine Screen       Propoxyphene Screen        Buprenorphine Screen       Methamphetamine Screen       Oxycodone Screen  Negative  23 0947    Tricyclic Antidepressants Screen                 Legend    ^: Historical                             Past OB History:     OB History    Para Term  AB Living   3 2 2 0 0 2   SAB IAB Ectopic Molar Multiple Live Births   0 0 0 0 0 2      # Outcome Date GA Lbr Brandon/2nd Weight Sex Delivery Anes PTL Lv   3 Current            2 Term 22 39w4d / 00:15 3320 g (7 lb 5.1 oz) M Vag-Spont EPI N HERLINDA      Name: AMRIT TODD      Apgar1: 8  Apgar5: 9   1 Term 21 37w3d  3289 g (7 lb 4 oz) M Vag-Spont EPI  HERLINDA       Past Medical History: Past Medical History:   Diagnosis Date    Anxiety     Asthma     SEASONAL    Other microscopic hematuria 2022 r sided flank pain, acute onset, urine dip moderate blood, straight cath 0-2 RBC; no WBC. Renal U/S unremarkable ; suspect small stone vs. Bladder irritation in pregnancy; strain urine. Tylenol as needed.     Postpartum depression 2022    Second degree perineal laceration during delivery 2022      Past Surgical History Past Surgical History:   Procedure Laterality Date    WISDOM TOOTH EXTRACTION        Family History: Family History   Problem Relation Age of Onset    Diabetes Mother     Diabetes Maternal Grandmother     Cancer Maternal Grandfather         Eye cancer    Diabetes Maternal Grandfather     Diabetes Maternal Uncle     Breast cancer Neg Hx     Ovarian cancer Neg Hx     Uterine cancer Neg Hx     Colon cancer Neg Hx     Pulmonary embolism Neg Hx     Deep vein thrombosis Neg Hx       Social History:  reports that she has never smoked. She has never used smokeless tobacco.   reports that she does not currently use alcohol.   reports no history of drug use.        General ROS: Pertinent items are noted in HPI    Objective       Vital Signs Range for the last 24 hours  Temperature: Temp:  [98.8 °F (37.1 °C)] 98.8 °F (37.1 °C)    Temp Source: Temp src: Oral   BP: BP: (123)/(67-80) 123/67   Pulse: Heart Rate:  [82-91] 82   Respirations: Resp:  [18-20] 18   SPO2: SpO2:  [100 %] 100 %   O2 Amount (l/min):     O2 Devices     Weight: Weight:  [104 kg (230 lb)] 104 kg (230 lb)     Physical Examination: General appearance - alert, well appearing, and in no distress  Mental status - alert, oriented to person, place, and time  Chest - clear to auscultation, no wheezes, rales or rhonchi, symmetric air entry  Heart - normal rate, regular rhythm, normal S1, S2, no murmurs, rubs, clicks or gallops  Abdomen - soft, nontender, gravid  Extremities - peripheral pulses normal, trace edema      Presentation: Vertex   Cervix: Exam by: Method: sterile exam per RN   Dilation: Cervical Dilation (cm): 4   Effacement: Cervical Effacement: 70%   Station:         Fetal Heart Rate Assessment   Method: Fetal HR Assessment Method: external   Beats/min: Fetal HR (beats/min): 140   Baseline: Fetal HR Baseline: normal range   Variability: Fetal HR Variability: moderate (amplitude range 6 to 25 bpm)   Accels: Fetal HR Accelerations: episodic, greater than/equal to 15 bpm, lasting at least 15 seconds   Decels: Fetal HR Decelerations: absent         Uterine Assessment   Method: Method: palpation, external tocotransducer   Frequency (min):     Ctx Count in 10 min:     Duration:     Intensity:         Scheller Units:       GBS is negative      Assessment & Plan       Full-term premature rupture of membranes        Assessment:  1.  Intrauterine pregnancy at 39w3d gestation with reactive fetal status.    2.  labor  with ROM  3.  Obstetrical history significant for is non-contributory.  4.  GBS status:   Group B Strep, DNA   Date Value Ref Range Status   09/18/2023 Negative Negative Final       Plan:  1. Vaginal anticipated  2. Plan of care has been reviewed with patient and patient agrees.   3.  Risks, benefits of treatment plan have been discussed.  4.  All questions have  been answered.  5.  Pitocin augmentation of labor as needed      Pepito Lee MD  10/4/2023  06:24 EDT

## 2023-10-04 NOTE — PLAN OF CARE
Problem: Labor Pain (Labor)  Goal: Acceptable Pain Control  Outcome: Ongoing, Progressing     Problem: Infection (Labor)  Goal: Absence of Infection Signs and Symptoms  Outcome: Ongoing, Progressing     Problem: Delayed Labor Progression (Labor)  Goal: Effective Progression to Delivery  Outcome: Ongoing, Progressing     Problem: Change in Fetal Wellbeing (Labor)  Goal: Stable Fetal Wellbeing  Outcome: Ongoing, Progressing     Problem: Bleeding (Labor)  Goal: Hemostasis  Outcome: Ongoing, Progressing   Goal Outcome Evaluation:

## 2023-10-04 NOTE — L&D DELIVERY NOTE
[facility]  Vaginal Delivery Note    Delivery     Delivery:      YOB: 2025    Time of Birth:  Gestational Age 6:16 PM   39w3d     Anesthesia:      Delivering clinician:     Forceps?   No   Vacuum? No    Shoulder dystocia present: No        Delivery narrative:  I was called to the room as the patient was complete complete +1 station.  The patient underwent a spontaneous vaginal delivery of a viable female  at 1815 hrs.  Baby was in LOP positionThe infant was bulb suctioned.  The cord was clamped x2 and cut after at least 60 seconds.  The placenta was delivered spontaneously and intact.  The weight was 8  pounds and 11 ounces and the Apgars were 8 and 9.   The vaginal and cervical exams were within normal limits.  The infant was in the warmer and mom was in recovery in stable and satisfactory condition.  The sponge counts and instrument counts were verified as correct.    Infant    Findings: female  infant     Infant observations: Weight: 3930 g (8 lb 10.6 oz)   Length: 21  in  Observations/Comments:        Apgars: 8  @ 1 minute /    9  @ 5 minutes         Placenta, Cord, and Fluid    Placenta delivered    at        Cord:   present.   Nuchal Cord?  no   Cord blood obtained:     Cord gases obtained:      Cord gas results: Venous:  No results found for: PHCVEN    Arterial:  No results found for: PHCART     Repair    Episiotomy: Not recorded     No    Lacerations: Yes  Laceration Information  Laceration Repaired?   Perineal:    2nd degree   Periurethral:       Labial:       Sulcus:       Vaginal:       Cervical:         Suture used for repair: 3-0 Vicryl  Laceration Length: 2cm   Estimated Blood Loss:       QBL: 144cc          Complications  none    Disposition  Mother to Mother Baby/Postpartum  in stable condition currently.  Baby to remains with mom in stable condition currently.    Electronically signed by Tatiana Johns MD, 10/04/23, 6:28 PM EDT.

## 2023-10-04 NOTE — ANESTHESIA PROCEDURE NOTES
Labor Epidural    Pre-sedation assessment completed: 10/4/2023 9:15 AM    Patient reassessed immediately prior to procedure    Patient location during procedure: OB  Start Time: 10/4/2023 9:15 AM  Stop Time: 10/4/2023 9:24 AM  Performed By  Anesthesiologist: Semaj Gallegos MD  Preanesthetic Checklist  Completed: patient identified, IV checked, risks and benefits discussed, surgical consent, monitors and equipment checked, pre-op evaluation and timeout performed  Prep:  Pt Position:sitting  Sterile Tech:cap, gloves, sterile barrier, mask and gown  Prep:chlorhexidine gluconate and isopropyl alcohol  Monitoring:blood pressure monitoring, continuous pulse oximetry and EKG  Epidural Block Procedure:  Approach:midline  Guidance:landmark technique and palpation technique  Location:L4-L5  Needle Type:Tuohy  Needle Gauge:17 G  Loss of Resistance Medium: saline  Loss of Resistance: 7cm  Cath Depth at skin:12 cm  Paresthesia: none  Aspiration:negative  Test Dose:negative  Medication: lidocaine 1.5%-EPINEPHrine 1:200,000 (XYLOCAINE W/EPI) injection - Epidural, Back   3 mL - 10/4/2023 9:24:00 AM  fentaNYL citrate (PF) (SUBLIMAZE) injection - Epidural, Back   100 mcg - 10/4/2023 9:24:00 AM  Number of Attempts: 1  Post Assessment:  Dressing:occlusive dressing applied and secured with tape  Pt Tolerance:patient tolerated the procedure well with no apparent complications  Complications:no

## 2023-10-04 NOTE — NURSING NOTE
" 39.3 weeks presents with c/o SROM at 0400, states large gush of clear fluid noted and that she is still leaking, pt. Denies any ctx: or vaginal bleeding, states +fm noted, states \"I was scheduled for IOL tomorrow \", no ctx: noted on monitor, abd. Soft, non-tender with palpation, sve4/70/-1 moderate amount clear fluid escaping vaginal vault, [id number],  the hospitalist called with the above information, orders received to admit for labor.  "

## 2023-10-05 LAB
HCT VFR BLD AUTO: 34.9 % (ref 34–46.6)
HGB BLD-MCNC: 10.8 G/DL (ref 12–15.9)

## 2023-10-05 PROCEDURE — 85018 HEMOGLOBIN: CPT | Performed by: OBSTETRICS & GYNECOLOGY

## 2023-10-05 PROCEDURE — 85014 HEMATOCRIT: CPT | Performed by: OBSTETRICS & GYNECOLOGY

## 2023-10-05 PROCEDURE — 25010000002 ENOXAPARIN PER 10 MG: Performed by: OBSTETRICS & GYNECOLOGY

## 2023-10-05 RX ADMIN — DOCUSATE SODIUM 100 MG: 100 CAPSULE, LIQUID FILLED ORAL at 20:07

## 2023-10-05 RX ADMIN — ENOXAPARIN SODIUM 40 MG: 100 INJECTION SUBCUTANEOUS at 18:14

## 2023-10-05 RX ADMIN — DOCUSATE SODIUM 100 MG: 100 CAPSULE, LIQUID FILLED ORAL at 08:24

## 2023-10-05 RX ADMIN — DOCUSATE SODIUM 100 MG: 100 CAPSULE, LIQUID FILLED ORAL at 01:57

## 2023-10-05 RX ADMIN — IBUPROFEN 600 MG: 600 TABLET, FILM COATED ORAL at 01:58

## 2023-10-05 RX ADMIN — MAGNESIUM HYDROXIDE 10 ML: 2400 SUSPENSION ORAL at 18:13

## 2023-10-05 RX ADMIN — ACETAMINOPHEN 650 MG: 325 TABLET ORAL at 18:13

## 2023-10-05 RX ADMIN — Medication: at 01:57

## 2023-10-05 RX ADMIN — ACETAMINOPHEN 650 MG: 325 TABLET ORAL at 04:55

## 2023-10-05 RX ADMIN — WITCH HAZEL: 500 SOLUTION RECTAL; TOPICAL at 01:56

## 2023-10-05 NOTE — PLAN OF CARE
Problem: Bleeding (Labor)  Goal: Hemostasis  Outcome: Met     Problem: Change in Fetal Wellbeing (Labor)  Goal: Stable Fetal Wellbeing  Outcome: Met     Problem: Delayed Labor Progression (Labor)  Goal: Effective Progression to Delivery  Outcome: Met     Problem: Infection (Labor)  Goal: Absence of Infection Signs and Symptoms  Outcome: Met  Intervention: Prevent or Manage Infection  Recent Flowsheet Documentation  Taken 10/5/2023 9037 by Arabella Chandra, RN  Infection Prevention:   visitors restricted/screened   single patient room provided   rest/sleep promoted   hand hygiene promoted   equipment surfaces disinfected   environmental surveillance performed   cohorting utilized     Problem: Labor Pain (Labor)  Goal: Acceptable Pain Control  Outcome: Met     Problem: Uterine Tachysystole (Labor)  Goal: Normal Uterine Contraction Pattern  Outcome: Met   Goal Outcome Evaluation:                         no

## 2023-10-05 NOTE — PROGRESS NOTES
MILY Bull  Vaginal Delivery Progress Note    Subjective   Postpartum Day 1: Vaginal Delivery    The patient feels well.  Her pain is well controlled with nonsteroidal anti-inflammatory drugs and Tylenol.   She is ambulating well.  Patient describes her bleeding as moderate lochia.    Bottle feeding without difficulty    Objective     Vital Signs Range for the last 24 hours  Temperature: Temp:  [97.9 °F (36.6 °C)-98.3 °F (36.8 °C)] 97.9 °F (36.6 °C)   Temp Source: Temp src: Oral   BP: BP: ()/(43-80) 121/54   Pulse: Heart Rate:  [] 74   Respirations: Resp:  [16-22] 18       Physical Exam:  General:  no acute distress.  Abdomen: abdomen is soft without significant tenderness, masses, organomegaly or guarding.   Fundus: appropriate, firm, non tender, small lochia   Extremities: normal, atraumatic, no cyanosis, and trace edema.     Rubella:   Rubella Antibodies, IgG   Date Value Ref Range Status   06/13/2023 1.04 Immune >0.99 index Final     Comment:                                     Non-immune       <0.90                                  Equivocal  0.90 - 0.99                                  Immune           >0.99     Rh Status:    RH type   Date Value Ref Range Status   10/04/2023 Positive  Final     Immunizations:   Immunization History   Administered Date(s) Administered    COVID-19 (MODERNA) 1st,2nd,3rd Dose Monovalent 10/26/2021, 12/06/2021    DTaP, Unspecified 1998, 08/14/2002    Flu Vaccine Quad PF >36MO 01/26/2016    Fluzone Quad >6mos (Multi-dose) 11/15/2021, 01/16/2023    HPV Quadrivalent 08/14/2009, 09/24/2010, 10/03/2012    Hep B, Adolescent or Pediatric 1998    HiB 1998    IPV 1998, 08/14/2002    Influenza Injectable Mdck Pf Quad 09/29/2017, 12/11/2020    Influenza Quad Vaccine (Inpatient) 09/29/2017    Influenza TIV (IM) 10/03/2012, 11/18/2013, 01/26/2016    Influenza, Unspecified 09/29/2017, 12/11/2020    MCV4 Unspecified 09/24/2010    MMR 08/14/2002    Meningococcal  MCV4P (Menactra) 09/24/2010    Tdap 08/14/2009, 03/04/2021       Assessment & Plan       Full-term premature rupture of membranes      Iftikhar Russell is Day 1  post-partum    Vaginal, Spontaneous         .      Plan:  Continue current care.      Electronically signed by Tatiana Johns MD, 10/05/23, 5:58 AM EDT.

## 2023-10-05 NOTE — ANESTHESIA POSTPROCEDURE EVALUATION
Patient: Iftikhar Russell    Procedure Summary       Date: 10/04/23 Room / Location:     Anesthesia Start: 0915 Anesthesia Stop: 1816    Procedure: LABOR ANALGESIA Diagnosis:     Scheduled Providers:  Provider: Semaj Gallegos MD    Anesthesia Type: general ASA Status: 2            Anesthesia Type: general    Vitals  Vitals Value Taken Time   /55 10/05/23 0927   Temp 36.7 °C (98.1 °F) 10/05/23 0927   Pulse 80 10/05/23 0927   Resp 16 10/05/23 0927   SpO2 99 % 10/05/23 0600           Post Anesthesia Care and Evaluation    Patient location during evaluation: bedside  Patient participation: complete - patient participated  Level of consciousness: awake and alert  Pain management: adequate    Airway patency: patent  Anesthetic complications: No anesthetic complications  PONV Status: none  Cardiovascular status: acceptable  Respiratory status: acceptable  Hydration status: acceptable  Post Neuraxial Block status: Motor and sensory function returned to baseline and No signs or symptoms of PDPH  Comments: An Anesthesiologist personally participated in the most demanding procedures (including induction and emergence if applicable) in the anesthesia plan, monitored the course of anesthesia administration at frequent intervals and remained physically present and available for immediate diagnosis and treatment of emergencies.

## 2023-10-05 NOTE — CONSULTS
met with patient at bedside. Patient admits to prior hx of postpartum depression. She reports that she is not currently taking medications for symptoms. She states that she has [facility] and her therapist on speed dial if she needs them at all. She identifies good family support and appears to be in good spirits today. No further needs are indicated at this time.

## 2023-10-05 NOTE — PLAN OF CARE
Problem: Adult Inpatient Plan of Care  Goal: Plan of Care Review  Outcome: Ongoing, Progressing  Goal: Patient-Specific Goal (Individualized)  Outcome: Ongoing, Progressing  Goal: Absence of Hospital-Acquired Illness or Injury  Outcome: Ongoing, Progressing  Goal: Optimal Comfort and Wellbeing  Outcome: Ongoing, Progressing  Goal: Readiness for Transition of Care  Outcome: Ongoing, Progressing     Problem: Adjustment to Role Transition (Postpartum Vaginal Delivery)  Goal: Successful Maternal Role Transition  Outcome: Ongoing, Progressing     Problem: Bleeding (Postpartum Vaginal Delivery)  Goal: Hemostasis  Outcome: Ongoing, Progressing     Problem: Infection (Postpartum Vaginal Delivery)  Goal: Absence of Infection Signs/Symptoms  Outcome: Ongoing, Progressing     Problem: Pain (Postpartum Vaginal Delivery)  Goal: Acceptable Pain Control  Outcome: Ongoing, Progressing     Problem: Urinary Retention (Postpartum Vaginal Delivery)  Goal: Effective Urinary Elimination  Outcome: Ongoing, Progressing   Goal Outcome Evaluation:

## 2023-10-06 VITALS
HEIGHT: 63 IN | WEIGHT: 230 LBS | RESPIRATION RATE: 16 BRPM | DIASTOLIC BLOOD PRESSURE: 70 MMHG | BODY MASS INDEX: 40.75 KG/M2 | TEMPERATURE: 98.2 F | SYSTOLIC BLOOD PRESSURE: 129 MMHG | HEART RATE: 72 BPM | OXYGEN SATURATION: 99 %

## 2023-10-06 RX ADMIN — IBUPROFEN 600 MG: 600 TABLET, FILM COATED ORAL at 07:57

## 2023-10-06 RX ADMIN — ACETAMINOPHEN 650 MG: 325 TABLET ORAL at 05:32

## 2023-10-06 RX ADMIN — DOCUSATE SODIUM 100 MG: 100 CAPSULE, LIQUID FILLED ORAL at 07:56

## 2023-10-06 NOTE — PLAN OF CARE
Problem: Adult Inpatient Plan of Care  Goal: Plan of Care Review  Outcome: Met  Goal: Patient-Specific Goal (Individualized)  Outcome: Met  Goal: Absence of Hospital-Acquired Illness or Injury  Outcome: Met  Goal: Optimal Comfort and Wellbeing  Outcome: Met  Goal: Readiness for Transition of Care  Outcome: Met     Problem: Adjustment to Role Transition (Postpartum Vaginal Delivery)  Goal: Successful Maternal Role Transition  Outcome: Met     Problem: Bleeding (Postpartum Vaginal Delivery)  Goal: Hemostasis  Outcome: Met     Problem: Infection (Postpartum Vaginal Delivery)  Goal: Absence of Infection Signs/Symptoms  Outcome: Met     Problem: Pain (Postpartum Vaginal Delivery)  Goal: Acceptable Pain Control  Outcome: Met     Problem: Urinary Retention (Postpartum Vaginal Delivery)  Goal: Effective Urinary Elimination  Outcome: Met   Goal Outcome Evaluation:

## 2023-10-06 NOTE — PLAN OF CARE
Problem: Adult Inpatient Plan of Care  Goal: Plan of Care Review  Outcome: Ongoing, Progressing  Goal: Patient-Specific Goal (Individualized)  Outcome: Ongoing, Progressing  Goal: Absence of Hospital-Acquired Illness or Injury  Outcome: Ongoing, Progressing  Intervention: Identify and Manage Fall Risk  Recent Flowsheet Documentation  Taken 10/6/2023 0600 by Sujata Severino RN  Safety Promotion/Fall Prevention: safety round/check completed  Taken 10/6/2023 0532 by Sujata Severino RN  Safety Promotion/Fall Prevention: safety round/check completed  Taken 10/6/2023 0430 by Sujata Severino RN  Safety Promotion/Fall Prevention: safety round/check completed  Goal: Optimal Comfort and Wellbeing  Outcome: Ongoing, Progressing  Goal: Readiness for Transition of Care  Outcome: Ongoing, Progressing     Problem: Adjustment to Role Transition (Postpartum Vaginal Delivery)  Goal: Successful Maternal Role Transition  Outcome: Ongoing, Progressing     Problem: Bleeding (Postpartum Vaginal Delivery)  Goal: Hemostasis  Outcome: Ongoing, Progressing     Problem: Infection (Postpartum Vaginal Delivery)  Goal: Absence of Infection Signs/Symptoms  Outcome: Ongoing, Progressing     Problem: Pain (Postpartum Vaginal Delivery)  Goal: Acceptable Pain Control  Outcome: Ongoing, Progressing     Problem: Urinary Retention (Postpartum Vaginal Delivery)  Goal: Effective Urinary Elimination  Outcome: Ongoing, Progressing   Goal Outcome Evaluation:

## 2023-10-06 NOTE — DISCHARGE SUMMARY
[facility]  Delivery Discharge Summary    Patient Name: Iftikhar Russell  : 2000  MRN: [id number]    Date of Admission: 10/4/2023  Date of Discharge:  10/6/2023   Primary Care Physician: Rogelio Mejia MD  Consults       No orders found from 2023 to 10/5/2023.             Procedures:  10/4/2023  - Vaginal, Spontaneous      Admitting Diagnosis:  Full-term premature rupture of membranes [O42.92]    Discharge Diagnosis:  Same as Admitting plus:   Pregnancy at 39w3d - Delivered     Delivery Summary     OB Surgeon:  Tatiana Johns MD  Anesthesia: Epidural  Delivery Type:   Perineum: OBPERINEUM: 2nd degree laceration  Feeding method: Bottle    Infant: female  infant;    Weight: 3930 g (8 lb 10.6 oz)     APGARS: 8  @ 1 minute / 9  @ 5 minutes     Venous Blood Gas: No results found for: PHCVEN   Arterial Blood Gas: No results found for: PHCART     Hospital Course     Hospital Course:    Patient is a 23 y.o.  who at 39w3d had a Vaginal delivery birth.  Her postpartum course was without complications.    On PPD # 2 she was ready for discharge.    She had normal lochia and pain well controlled with oral medications    Day of Discharge     Vital Signs:  Temp:  [98.1 °F (36.7 °C)-98.7 °F (37.1 °C)] 98.2 °F (36.8 °C)  Heart Rate:  [71-86] 72  Resp:  [16] 16  BP: ()/(41-70) 129/70    On the day of discharge POSTPARTUM pt tolerating a regular diet, ambulating, pain well controlled, urinating spontaneously and lochia appropriate.   Vital signs were stable and afebrile.  Exam was within normal limits.  Fundus was below umbilicus and nontender.  Meeting discharge criteria and desired discharge home.  Postpartum instructions and FU reviewed and questions answered.    Pertinent  and/or Most Recent Results     LAB RESULTS:   Lab Results   Component Value Date    WBC 6.70 10/04/2023    HGB 10.8 (L) 10/05/2023    HCT 34.9 10/05/2023     10/04/2023       Discharge Details        Discharge Medications         Changes to Medications        Instructions Start Date   albuterol sulfate  (90 Base) MCG/ACT inhaler  Commonly known as: PROVENTIL HFA;VENTOLIN HFA;PROAIR HFA  What changed: Another medication with the same name was changed. Make sure you understand how and when to take each.   2 puffs, Inhalation, Every 6 Hours PRN      albuterol (2.5 MG/3ML) 0.083% nebulizer solution  Commonly known as: PROVENTIL  What changed: when to take this   2.5 mg, Nebulization, Every 6 Hours PRN             Continue These Medications        Instructions Start Date   aspirin 81 MG EC tablet   81 mg, Oral, Daily      metoclopramide 10 MG tablet  Commonly known as: REGLAN   10 mg, Oral, 4 Times Daily      prenatal vitamin 27-0.8 27-0.8 MG tablet tablet   Oral, Daily             May continue ibuprofen, tylenol, tucks pads and dermaplast    Allergies   Allergen Reactions    Lavender Oil Shortness Of Breath    Prunus Persica (Peach) Anaphylaxis and Hives       Discharge Disposition:   Home, self-care    Discharge Condition:  Good    Follow Up:  Future Appointments   Date Time Provider Department Center   11/8/2023 10:10 AM Jluis Aragon MD [facility]       Electronically signed by Tatiana Johns MD, 10/06/23, 7:53 AM EDT.

## 2023-11-08 ENCOUNTER — TELEPHONE (OUTPATIENT)
Dept: OBSTETRICS AND GYNECOLOGY | Facility: CLINIC | Age: 23
End: 2023-11-08

## 2023-12-15 ENCOUNTER — OFFICE VISIT (OUTPATIENT)
Dept: OBSTETRICS AND GYNECOLOGY | Facility: CLINIC | Age: 23
End: 2023-12-15
Payer: COMMERCIAL

## 2023-12-15 VITALS
DIASTOLIC BLOOD PRESSURE: 65 MMHG | SYSTOLIC BLOOD PRESSURE: 101 MMHG | WEIGHT: 217 LBS | HEIGHT: 63 IN | BODY MASS INDEX: 38.45 KG/M2 | HEART RATE: 80 BPM

## 2023-12-15 DIAGNOSIS — Z30.2 ENCOUNTER FOR STERILIZATION: ICD-10-CM

## 2023-12-15 RX ORDER — SODIUM CHLORIDE 0.9 % (FLUSH) 0.9 %
10 SYRINGE (ML) INJECTION AS NEEDED
OUTPATIENT
Start: 2023-12-15

## 2023-12-15 RX ORDER — SODIUM CHLORIDE 9 MG/ML
40 INJECTION, SOLUTION INTRAVENOUS AS NEEDED
OUTPATIENT
Start: 2023-12-15

## 2023-12-15 RX ORDER — SODIUM CHLORIDE 0.9 % (FLUSH) 0.9 %
3 SYRINGE (ML) INJECTION EVERY 12 HOURS SCHEDULED
OUTPATIENT
Start: 2023-12-15

## 2023-12-15 NOTE — PROGRESS NOTES
"POSTPARTUM Follow Up Visit    CC:  Postpartum     HPI:   Iftikhar Russell is a 23 y.o.  s/p  on 10/4/2023       Antepartum or Postpartum complications:   Patient Active Problem List   Diagnosis    Supervision of other normal pregnancy, antepartum    Anxiety    History of gestational hypertension    Obesity in pregnancy, antepartum    Autistic disorder on the spectrum- low    Late prenatal care    Mild intermittent asthma without complication    Fall    Excessive fetal growth affecting management of pregnancy, antepartum    Nausea and vomiting in pregnancy    Full-term premature rupture of membranes    Encounter for sterilization       Delivery type:    Perineum : 2nd degree laceration  Feeding: Bottle    Pain:  No; no urinary or bowel complaints.   Vaginal Bleeding:  she has had several menses since delivery: coming off menses today. She has not been sexually active since delivery   EPDS score: 12 answer to # 10 was zero    She gets support from FOB and mother. She feels that she is doing well overall from a mood stand point. No SI or HI   Plans for BC:  Sterilization/tubal  Last PAP:  NILM 2021   Last Completed Pap Smear       This patient has no relevant Health Maintenance data.            /65   Pulse 80   Ht 160 cm (63\")   Wt 98.4 kg (217 lb)   LMP 12/10/2023   Breastfeeding No   BMI 38.44 kg/m²     Physical Exam  Vitals and nursing note reviewed. Exam conducted with a chaperone present.   Constitutional:       General: She is not in acute distress.     Appearance: Normal appearance. She is not toxic-appearing.   HENT:      Head: Normocephalic and atraumatic.   Eyes:      Extraocular Movements: Extraocular movements intact.      Conjunctiva/sclera: Conjunctivae normal.   Cardiovascular:      Pulses: Normal pulses.   Pulmonary:      Effort: Pulmonary effort is normal.   Abdominal:      General: There is no distension.      Palpations: Abdomen is soft.      Tenderness: There is no " abdominal tenderness.   Genitourinary:     General: Normal vulva.      Exam position: Lithotomy position.      Labia:         Right: No tenderness, lesion or injury.         Left: No tenderness, lesion or injury.       Vagina: Normal.      Cervix: Normal.      Uterus: Normal.       Adnexa: Right adnexa normal and left adnexa normal.   Musculoskeletal:      Cervical back: Normal range of motion.   Skin:     General: Skin is warm and dry.   Neurological:      Mental Status: She is alert and oriented to person, place, and time.   Psychiatric:         Mood and Affect: Mood normal.         Behavior: Behavior normal.         Thought Content: Thought content normal.           ASSESSMENT AND PLAN: Iftikhar Russell is a 23 y.o.  presenting for postpartum evaluation.  Patient now postpartum day number 72.  Patient has experienced a weight loss of 13# since delivery.  Patient desires sterilization for contraception.  Risk benefits and alternatives of contraception discussed with patient.  Specifically discussed with patient male sterilization as well as female sterilization.  Risk of surgery were discussed with patient.  Including bleeding, infection, injury surrounding structures potential complications from anesthesia, DVT/PE, death.  Discussed with patient risk of regret in patients under the age of 30 is approximately 30%.  Patient was given information regarding [email or web address].  After discussion patient desiring to have tubal paperwork signed today and schedule surgical procedure.  Decision for surgery today.  No absolute contraindications to contraception method.  Patient to follow-up 1 week prior to surgical intervention.  Patient with plans to use condoms with sexual encounters moving forward.    Diagnoses and all orders for this visit:    1. Postpartum follow-up (Primary)    2. Encounter for sterilization  -     Case Request; Standing  -     sodium chloride 0.9 % flush 3 mL  -     sodium chloride 0.9 % flush 10  mL  -     sodium chloride 0.9 % infusion 40 mL  -     Case Request    Other orders  -     Code Status and Medical Interventions:; Standing  -     Follow Anesthesia Guidelines / Protocol; Future  -     Follow Anesthesia Guidelines / Protocol; Standing  -     Chlorhexidine Skin Prep; Future  -     Obtain Informed Consent; Standing  -     Verify / Perform Chlorhexidine Skin Prep; Standing  -     Insert Peripheral IV; Standing  -     Saline Lock & Maintain IV Access; Standing        Counseling:  All birth control options reviewed in detail.  R/B/A/SE/E of each wrt pts PMHx and prior BC use  May resume intercourse  May resume normal activities  Core strengthening exercises reviewed and recommended  Kegel exercises reviewed and recommended  Ok to return to work/school      Follow Up:  Return for one week prior to sterilization procedure for preoperative appointment. .          Clement Khan MD  12/15/2023

## 2024-01-25 NOTE — PRE-PROCEDURE INSTRUCTIONS
IMPORTANT INSTRUCTIONS - PRE-ADMISSION TESTING  DO NOT EAT OR CHEW anything after midnight the night before your procedure.    You may have CLEAR liquids up to 2 hours prior to ARRIVAL time.     __Bring albuterol inhaler ______DO NOT BRING your medications to the hospital with you, UNLESS something has changed since your PRE-Admission Testing appointment.  Hold all vitamins, supplements, and NSAIDS (Non- steroidal anti-inflammatory meds) for one week prior to surgery (you MAY take Tylenol or Acetaminophen).  If you are diabetic, check your blood sugar the morning of your procedure. If it is less than 70 or if you are feeling symptomatic, call the following number for further instructions: [phone]  Use your inhalers/nebulizers as usual, the morning of your procedure. BRING YOUR INHALERS with you.   Bring your CPAP or BIPAP to hospital, ONLY IF YOU WILL BE SPENDING THE NIGHT.   Make sure you have a ride home and have someone who will stay with you the day of your procedure after you go home.  If you have any questions, please call your Pre-Admission Testing Nurse, Marek_at [phone].   Per anesthesia request, do not smoke for 24 hours before your procedure or as instructed by your surgeon.    Clear Liquid Diet        Find out when you need to start a clear liquid diet.   Think of “clear liquids” as anything you could read a newspaper through. This includes things like water, broth, sports drinks, or tea WITHOUT any kind of milk or cream.           Once you are told to start a clear liquid diet, only drink these things until 2 hours before arrival to the hospital or when the hospital says to stop. Total volume limitation: 8 oz.       Clear liquids you CAN drink:   Water   Clear broth: beef, chicken, vegetable, or bone broth with nothing in it   Gatorade   Lemonade or Oumar-aid   Soda   Tea, coffee (NO cream or honey)   Jell-O (without fruit)   Popsicles (without fruit or cream)   Italian ices   Juice  without pulp: apple, white, grape   You may use salt, pepper, and sugar  No clear liquids     Do NOT drink:   Milk or cream   Soy milk, almond milk, coconut milk, or other non-dairy drinks and   creamers   Milkshakes or smoothies   Tomato juice   Orange juice   Grapefruit juice   Cream soups or any other than broth         Clear Liquid Diet:  Do NOT eat any solid food.  Do NOT eat or suck on mints or candy.  Do NOT chew gum.  Do NOT drink thick liquids like milk or juice with pulp in it.  Do NOT add milk, cream, or anything like soy milk or almond milk to coffee or tea.     PREOPERATIVE (BEFORE SURGERY)              BATHING INSTRUCTIONS  Instructions:    You will need to shower 1 times utilizing the soap provided; at the times indicated   below:      Wash your hair and face with normal shampoo and soap, rinse it well before using the surgical soap.      In the shower, wet the skin completely with water from your neck to your feet. Apply the cleanser to your   body ONLY FROM THE NECK TO YOUR FEET.     Do NOT USE THE CLEANSER ON YOUR FACE, HEAD, OR GENITAL (PRIVATE) AREAS.   Keep it out of your eyes, ears, and mouth because of the risk of injury to those areas.      Scrub with a clean washcloth for each bath utilizing the soap provided from the top of your body to the   bottom starting at the neck area.      Pay close attention to your armpits, groin area, and the site of surgery.      Wash your body gently for 5 minutes. Stand outside the stream or turn off the water while scrubbing your   body. Do NOT wash with your regular soap after the surgical cleanser is used.      RINSE THE CLEANSER OFF COMPLETELY with plenty of water. Rinse the area again thoroughly.      Dry off with a clean towel. The surgical soap can cause dryness; however do NOT APPLY LOTION,   CREAM, POWDER, and/or DEODORANT AFTER SHOWERING.     Be sure to where clean clothes after showering.      Ensure CLEAN BED LINENS AFTER FIRST wash with the  surgical soap.      NO PETS ALLOWED IN THE BED with you after utilizing the surgical soap.

## 2024-01-26 ENCOUNTER — TELEPHONE (OUTPATIENT)
Dept: OBSTETRICS AND GYNECOLOGY | Facility: CLINIC | Age: 24
End: 2024-01-26
Payer: COMMERCIAL

## 2024-01-26 ENCOUNTER — OFFICE VISIT (OUTPATIENT)
Dept: OBSTETRICS AND GYNECOLOGY | Facility: CLINIC | Age: 24
End: 2024-01-26
Payer: COMMERCIAL

## 2024-01-26 VITALS
WEIGHT: 216 LBS | HEART RATE: 86 BPM | SYSTOLIC BLOOD PRESSURE: 111 MMHG | BODY MASS INDEX: 38.26 KG/M2 | DIASTOLIC BLOOD PRESSURE: 54 MMHG

## 2024-01-26 DIAGNOSIS — Z32.02 PREGNANCY EXAMINATION OR TEST, NEGATIVE RESULT: ICD-10-CM

## 2024-01-26 DIAGNOSIS — Z30.2 ENCOUNTER FOR STERILIZATION: Primary | ICD-10-CM

## 2024-01-26 PROBLEM — O99.210 OBESITY IN PREGNANCY, ANTEPARTUM: Status: RESOLVED | Noted: 2023-06-13 | Resolved: 2024-01-26

## 2024-01-26 PROBLEM — O21.9 NAUSEA AND VOMITING IN PREGNANCY: Status: RESOLVED | Noted: 2023-08-28 | Resolved: 2024-01-26

## 2024-01-26 PROBLEM — O36.60X0 EXCESSIVE FETAL GROWTH AFFECTING MANAGEMENT OF PREGNANCY, ANTEPARTUM: Status: RESOLVED | Noted: 2023-08-21 | Resolved: 2024-01-26

## 2024-01-26 PROBLEM — Z34.80 SUPERVISION OF OTHER NORMAL PREGNANCY, ANTEPARTUM: Status: RESOLVED | Noted: 2023-06-12 | Resolved: 2024-01-26

## 2024-01-26 PROBLEM — Z87.59 HISTORY OF GESTATIONAL HYPERTENSION: Status: RESOLVED | Noted: 2023-06-13 | Resolved: 2024-01-26

## 2024-01-26 PROBLEM — O42.92 FULL-TERM PREMATURE RUPTURE OF MEMBRANES: Status: RESOLVED | Noted: 2023-10-04 | Resolved: 2024-01-26

## 2024-01-26 PROBLEM — O09.30 LATE PRENATAL CARE: Status: RESOLVED | Noted: 2023-06-13 | Resolved: 2024-01-26
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
## 2024-01-26 LAB
B-HCG UR QL: NEGATIVE
EXPIRATION DATE: NORMAL
INTERNAL NEGATIVE CONTROL: NORMAL
INTERNAL POSITIVE CONTROL: NORMAL
Lab: NORMAL

## 2024-01-26 NOTE — PROGRESS NOTES
[facility]   HISTORY AND PHYSICAL    Patient Name:Iftikhar Russell  : 2000  MRN: [id number]  Primary Care Physician: Rogelio Mejia MD  Date of admission: (Not on file)    Subjective   Subjective     Chief Complaint: Desires for sterilization    History of Present Illness   Iftikhar Russell is a 23 y.o. female preop appointment for sterilization.  Patient desires to move forward with surgical intervention.  Patient does not desire any children in the future.    Review of Systems   Constitutional: Negative.  Negative for fatigue and fever.   Respiratory:  Negative for chest tightness, shortness of breath and wheezing.    Cardiovascular:  Negative for chest pain, palpitations and leg swelling.   Gastrointestinal:  Negative for abdominal pain, nausea and vomiting.   Genitourinary: Negative.    Musculoskeletal: Negative.    Skin: Negative.    Neurological: Negative.    Psychiatric/Behavioral: Negative.           Personal History     Past Medical History:   Diagnosis Date    ADHD     Anesthesia     Pt states that she is hard to put to sleep related to her being a true redhead    Anxiety     Asthma     SEASONAL    GERD (gastroesophageal reflux disease)     History of gestational hypertension     G1 36 week IOL  G2 37 week IOL    Other microscopic hematuria 2022 r sided flank pain, acute onset, urine dip moderate blood, straight cath 0-2 RBC; no WBC. Renal U/S unremarkable ; suspect small stone vs. Bladder irritation in pregnancy; strain urine. Tylenol as needed.     Postpartum depression 2022    Second degree perineal laceration during delivery 2022       Past Surgical History:   Procedure Laterality Date    WISDOM TOOTH EXTRACTION         Family History: Her family history includes Cancer in her maternal grandfather; Diabetes in her maternal grandfather, maternal grandmother, maternal uncle, and mother.     Social History: She  reports that she has never smoked. She has never  been exposed to tobacco smoke. She has never used smokeless tobacco. She reports that she does not currently use alcohol. She reports that she does not use drugs.    Home Medications:  albuterol sulfate HFA    Allergies:  She is allergic to lavender oil and prunus persica (peach).    Objective    Objective     Vitals:    Heart Rate:  [86] 86  BP: (111)/(54) 111/54    Physical Exam  Vitals and nursing note reviewed.   Constitutional:       General: She is not in acute distress.     Appearance: Normal appearance. She is obese. She is not toxic-appearing.   HENT:      Head: Normocephalic and atraumatic.   Eyes:      Extraocular Movements: Extraocular movements intact.      Conjunctiva/sclera: Conjunctivae normal.   Cardiovascular:      Pulses: Normal pulses.   Pulmonary:      Effort: Pulmonary effort is normal.   Abdominal:      General: There is no distension.      Palpations: Abdomen is soft.      Tenderness: There is no abdominal tenderness.   Genitourinary:     Comments: deferred  Musculoskeletal:      Cervical back: Normal range of motion.   Skin:     General: Skin is warm and dry.   Neurological:      Mental Status: She is alert and oriented to person, place, and time.   Psychiatric:         Mood and Affect: Mood normal.         Behavior: Behavior normal.         Thought Content: Thought content normal.          Result Review    [unfilled]    Assessment & Plan   Assessment / Plan     Brief Patient Summary:  Iftikhar Russell is a 23 y.o. female presenting for preoperative appointment.  Patient with desires for female sterilization.  Tubal paperwork signed 12/15/2023.  Risk of bleeding, infection, injury surrounding structures, DVT/PE, complication with anesthesia and death discussed with patient.  Expected recovery course discussed with patient.  Appropriate use of pain medication discussed.  Patient previously counseled on risk of regret.  Desires to move forward with surgical intervention.    1. Encounter  for sterilization  - POC Pregnancy, Urine negative       Plan:   -To OR for laparoscopic bilateral salpingectomy    DVT prophylaxis:  SCDs      Clement Khan MD

## 2024-01-26 NOTE — TELEPHONE ENCOUNTER
Patient has been contacted regarding need to reschedule surgery from 1/29/24 - Will call patient back to reschedule ASAP.

## 2024-02-21 NOTE — PROGRESS NOTES
"GYN Visit    Chief Complaint   Patient presents with    Pre-op Exam     Tubal        HPI:   23 y.o. with LMP 24 here to discuss sterilization.  Pt s/p  10/4/23.  She does not desire any more children. She is not breastfeeding.      History: PMHx, Meds, Allergies, PSHx, Social Hx, and POBHx all reviewed and updated.    PHYSICAL EXAM:  /72   Pulse 80   Ht 160 cm (63\")   Wt 98.4 kg (217 lb)   LMP 2024   Breastfeeding No   BMI 38.44 kg/m²   General- NAD, alert and oriented, appropriate  Psych- Normal mood, good memory      ASSESSMENT AND PLAN:  Diagnoses and all orders for this visit:    1. Encounter for consultation for female sterilization (Primary)    The patient was counseled on the risks, benefits and alternatives of BTL.  Risks reviewed, but are not limited to: anesthesia, bleeding, transfusion, infection, damage to organs, bowels, bladder, other internal organs requiring additional surgery to repair or remove any damaged structures.  Possible reoperation, wound separation, blood clots, and death.  Specifically with BTL, she understands it can fail and failure rates are usually less 1/250- 1/300.  If it does fail, she understands she is at higher risk for ectopic or tubal pregnancy and will need evaluation immediately with a positive pregnancy test.   She also understands approximately 10% of women will notice a heavier and more uncomfortable periods. She understands the procedure is meant to be permanent with no guarantee for future fertility after re-anastamosis.  She understands the increased costs associated with tubal reversal and possible IVF. She declines any non-permanent or non-surgical contraception options to include those with similar efficacy and no surgical risk.  All her questions have been answered to her satisfaction and she desires to proceed.Specifically with salpingectomy, she understands it is not reversible and she would require in vitro fertilization (IVF) to " conceive in the future. There is a small possibility it could affect the blood flow to her ovaries and therefore cause earlier menopause.  We have discussed if her intra-operative findings indicate that removal her fallopian tubes would not be recommended, she DOES desire tubal occlusion.        Follow Up:  No follow-ups on file.          Charla Mathias,   02/23/2024    [facility]  [facility] [place] [address]  Dept: [phone]  Dept Fax: [phone]  Loc: [phone]  Loc Fax: [phone]

## 2024-02-23 ENCOUNTER — OFFICE VISIT (OUTPATIENT)
Dept: OBSTETRICS AND GYNECOLOGY | Facility: CLINIC | Age: 24
End: 2024-02-23
Payer: COMMERCIAL

## 2024-02-23 VITALS
BODY MASS INDEX: 38.45 KG/M2 | DIASTOLIC BLOOD PRESSURE: 72 MMHG | WEIGHT: 217 LBS | SYSTOLIC BLOOD PRESSURE: 109 MMHG | HEART RATE: 80 BPM | HEIGHT: 63 IN

## 2024-02-23 DIAGNOSIS — Z30.09 ENCOUNTER FOR CONSULTATION FOR FEMALE STERILIZATION: Primary | ICD-10-CM

## 2024-02-28 ENCOUNTER — TELEPHONE (OUTPATIENT)
Dept: OBSTETRICS AND GYNECOLOGY | Facility: CLINIC | Age: 24
End: 2024-02-28
Payer: COMMERCIAL

## 2024-03-01 ENCOUNTER — TELEPHONE (OUTPATIENT)
Dept: OBSTETRICS AND GYNECOLOGY | Facility: CLINIC | Age: 24
End: 2024-03-01
Payer: COMMERCIAL

## 2024-03-01 NOTE — TELEPHONE ENCOUNTER
LVM - trying to reach to confirm surgery date of 3/18/24.  Will mail out paperwork and it is also on My chart.  [phone] option 3

## 2024-03-14 NOTE — PRE-PROCEDURE INSTRUCTIONS
IMPORTANT INSTRUCTIONS - PRE-ADMISSION TESTING  DO NOT EAT OR CHEW anything after midnight the night before your procedure.    You may have CLEAR liquids up to __2_ hours prior to ARRIVAL time.   Take the following medications the morning of your procedure with JUST A SIP OF WATER:  _TO BRING ALBUTEROL INHALER ________________  DO NOT BRING your medications to the hospital with you, UNLESS something has changed since your PRE-Admission Testing appointment.  Hold all vitamins, supplements, and NSAIDS (Non- steroidal anti-inflammatory meds) for one week prior to surgery (you MAY take Tylenol or Acetaminophen).  If you are diabetic, check your blood sugar the morning of your procedure. If it is less than 70 or if you are feeling symptomatic, call the following number for further instructions: [phone]  Use your inhalers/nebulizers as usual, the morning of your procedure. BRING YOUR INHALERS with you.   Bring your CPAP or BIPAP to hospital, ONLY IF YOU WILL BE SPENDING THE NIGHT.   Make sure you have a ride home and have someone who will stay with you the day of your procedure after you go home.  If you have any questions, please call your Pre-Admission Testing Nurse, ___KRISTIN__ at [phone]__.   Per anesthesia request, do not smoke for 24 hours before your procedure or as instructed by your surgeon.    Clear Liquid Diet        Find out when you need to start a clear liquid diet.   Think of “clear liquids” as anything you could read a newspaper through. This includes things like water, broth, sports drinks, or tea WITHOUT any kind of milk or cream.           Once you are told to start a clear liquid diet, only drink these things until 2 hours before arrival to the hospital or when the hospital says to stop. Total volume limitation: 8 oz.       Clear liquids you CAN drink:   Water   Clear broth: beef, chicken, vegetable, or bone broth with nothing in it   Gatorade   Lemonade or Oumar-aid   Soda   Tea, coffee  (NO cream or honey)   Jell-O (without fruit)   Popsicles (without fruit or cream)   Italian ices   Juice without pulp: apple, white, grape   You may use salt, pepper, and sugar    Do NOT drink:   Milk or cream   Soy milk, almond milk, coconut milk, or other non-dairy drinks and   creamers   Milkshakes or smoothies   Tomato juice   Orange juice   Grapefruit juice   Cream soups or any other than broth         Clear Liquid Diet:  Do NOT eat any solid food.  Do NOT eat or suck on mints or candy.  Do NOT chew gum.  Do NOT drink thick liquids like milk or juice with pulp in it.  Do NOT add milk, cream, or anything like soy milk or almond milk to coffee or tea.       PREOPERATIVE (BEFORE SURGERY)              BATHING INSTRUCTIONS  Instructions:    You will need to shower 1  time utilizing the soap provided; at the times indicated   below:     3/18/24 MORNING OF SURGERY      Wash your hair and face with normal shampoo and soap, rinse it well before using the surgical soap.      In the shower, wet the skin completely with water from your neck to your feet. Apply the cleanser to your   body ONLY FROM THE NECK TO YOUR FEET.     Do NOT USE THE CLEANSER ON YOUR FACE, HEAD, OR GENITAL (PRIVATE) AREAS.   Keep it out of your eyes, ears, and mouth because of the risk of injury to those areas.      Scrub with a clean washcloth for each bath utilizing the soap provided from the top of your body to the   bottom starting at the neck area.      Pay close attention to your armpits, groin area, and the site of surgery.      Wash your body gently for 5 minutes. Stand outside the stream or turn off the water while scrubbing your   body. Do NOT wash with your regular soap after the surgical cleanser is used.      RINSE THE CLEANSER OFF COMPLETELY with plenty of water. Rinse the area again thoroughly.      Dry off with a clean towel. The surgical soap can cause dryness; however do NOT APPLY LOTION,   CREAM, POWDER, and/or DEODORANT AFTER  SHOWERING.     Be sure to where clean clothes after showering.      Ensure CLEAN BED LINENS AFTER FIRST wash with the surgical soap.      NO PETS ALLOWED IN THE BED with you after utilizing the surgical soap.

## 2024-03-18 ENCOUNTER — ANESTHESIA (OUTPATIENT)
Dept: PERIOP | Facility: HOSPITAL | Age: 24
End: 2024-03-18
Payer: COMMERCIAL

## 2024-03-18 ENCOUNTER — ANESTHESIA EVENT (OUTPATIENT)
Dept: PERIOP | Facility: HOSPITAL | Age: 24
End: 2024-03-18
Payer: COMMERCIAL

## 2024-03-18 ENCOUNTER — HOSPITAL ENCOUNTER (OUTPATIENT)
Facility: HOSPITAL | Age: 24
Setting detail: HOSPITAL OUTPATIENT SURGERY
Discharge: HOME | End: 2024-03-18
Attending: STUDENT IN AN ORGANIZED HEALTH CARE EDUCATION/TRAINING PROGRAM | Admitting: STUDENT IN AN ORGANIZED HEALTH CARE EDUCATION/TRAINING PROGRAM
Payer: COMMERCIAL

## 2024-03-18 VITALS
TEMPERATURE: 97.3 F | DIASTOLIC BLOOD PRESSURE: 68 MMHG | WEIGHT: 220.02 LBS | RESPIRATION RATE: 18 BRPM | BODY MASS INDEX: 38.98 KG/M2 | SYSTOLIC BLOOD PRESSURE: 123 MMHG | HEART RATE: 92 BPM | HEIGHT: 63 IN | OXYGEN SATURATION: 98 %

## 2024-03-18 DIAGNOSIS — Z98.890 S/P LAPAROSCOPY: Primary | ICD-10-CM

## 2024-03-18 DIAGNOSIS — Z30.2 ENCOUNTER FOR STERILIZATION: ICD-10-CM

## 2024-03-18 LAB — B-HCG UR QL: NEGATIVE

## 2024-03-18 PROCEDURE — 25810000003 LACTATED RINGERS PER 1000 ML: Performed by: ANESTHESIOLOGY

## 2024-03-18 PROCEDURE — 25010000002 DEXAMETHASONE PER 1 MG: Performed by: NURSE ANESTHETIST, CERTIFIED REGISTERED

## 2024-03-18 PROCEDURE — 25010000002 ONDANSETRON PER 1 MG: Performed by: NURSE ANESTHETIST, CERTIFIED REGISTERED

## 2024-03-18 PROCEDURE — 81025 URINE PREGNANCY TEST: CPT | Performed by: ANESTHESIOLOGY

## 2024-03-18 PROCEDURE — 25010000002 SUGAMMADEX 200 MG/2ML SOLUTION: Performed by: NURSE ANESTHETIST, CERTIFIED REGISTERED

## 2024-03-18 PROCEDURE — 25010000002 PROPOFOL 10 MG/ML EMULSION: Performed by: NURSE ANESTHETIST, CERTIFIED REGISTERED

## 2024-03-18 PROCEDURE — 25010000002 MIDAZOLAM PER 1MG: Performed by: ANESTHESIOLOGY

## 2024-03-18 PROCEDURE — 25010000002 FENTANYL CITRATE (PF) 50 MCG/ML SOLUTION: Performed by: NURSE ANESTHETIST, CERTIFIED REGISTERED

## 2024-03-18 PROCEDURE — 88302 TISSUE EXAM BY PATHOLOGIST: CPT | Performed by: STUDENT IN AN ORGANIZED HEALTH CARE EDUCATION/TRAINING PROGRAM

## 2024-03-18 RX ORDER — DEXAMETHASONE SODIUM PHOSPHATE 4 MG/ML
INJECTION, SOLUTION INTRA-ARTICULAR; INTRALESIONAL; INTRAMUSCULAR; INTRAVENOUS; SOFT TISSUE AS NEEDED
Status: DISCONTINUED | OUTPATIENT
Start: 2024-03-18 | End: 2024-03-18 | Stop reason: SURG

## 2024-03-18 RX ORDER — ONDANSETRON 2 MG/ML
INJECTION INTRAMUSCULAR; INTRAVENOUS AS NEEDED
Status: DISCONTINUED | OUTPATIENT
Start: 2024-03-18 | End: 2024-03-18 | Stop reason: SURG

## 2024-03-18 RX ORDER — ONDANSETRON 2 MG/ML
4 INJECTION INTRAMUSCULAR; INTRAVENOUS ONCE AS NEEDED
Status: DISCONTINUED | OUTPATIENT
Start: 2024-03-18 | End: 2024-03-18 | Stop reason: HOSPADM

## 2024-03-18 RX ORDER — SCOLOPAMINE TRANSDERMAL SYSTEM 1 MG/1
1 PATCH, EXTENDED RELEASE TRANSDERMAL ONCE
Status: DISCONTINUED | OUTPATIENT
Start: 2024-03-18 | End: 2024-03-18 | Stop reason: HOSPADM

## 2024-03-18 RX ORDER — LIDOCAINE HYDROCHLORIDE AND EPINEPHRINE 10; 10 MG/ML; UG/ML
INJECTION, SOLUTION INFILTRATION; PERINEURAL AS NEEDED
Status: DISCONTINUED | OUTPATIENT
Start: 2024-03-18 | End: 2024-03-18 | Stop reason: HOSPADM

## 2024-03-18 RX ORDER — IBUPROFEN 600 MG/1
600 TABLET ORAL EVERY 6 HOURS PRN
Qty: 20 TABLET | Refills: 0 | Status: SHIPPED | OUTPATIENT
Start: 2024-03-18 | End: 2024-03-23

## 2024-03-18 RX ORDER — PROPOFOL 10 MG/ML
VIAL (ML) INTRAVENOUS AS NEEDED
Status: DISCONTINUED | OUTPATIENT
Start: 2024-03-18 | End: 2024-03-18 | Stop reason: SURG

## 2024-03-18 RX ORDER — SODIUM CHLORIDE 9 MG/ML
40 INJECTION, SOLUTION INTRAVENOUS AS NEEDED
Status: DISCONTINUED | OUTPATIENT
Start: 2024-03-18 | End: 2024-03-18 | Stop reason: HOSPADM

## 2024-03-18 RX ORDER — SODIUM CHLORIDE 0.9 % (FLUSH) 0.9 %
3 SYRINGE (ML) INJECTION EVERY 12 HOURS SCHEDULED
Status: DISCONTINUED | OUTPATIENT
Start: 2024-03-18 | End: 2024-03-18 | Stop reason: HOSPADM

## 2024-03-18 RX ORDER — MIDAZOLAM HYDROCHLORIDE 2 MG/2ML
2 INJECTION, SOLUTION INTRAMUSCULAR; INTRAVENOUS ONCE
Status: COMPLETED | OUTPATIENT
Start: 2024-03-18 | End: 2024-03-18

## 2024-03-18 RX ORDER — ACETAMINOPHEN 500 MG
1000 TABLET ORAL EVERY 6 HOURS
Qty: 40 TABLET | Refills: 0 | Status: SHIPPED | OUTPATIENT
Start: 2024-03-18 | End: 2024-03-23

## 2024-03-18 RX ORDER — DEXMEDETOMIDINE HYDROCHLORIDE 100 UG/ML
INJECTION, SOLUTION INTRAVENOUS AS NEEDED
Status: DISCONTINUED | OUTPATIENT
Start: 2024-03-18 | End: 2024-03-18 | Stop reason: SURG

## 2024-03-18 RX ORDER — ACETAMINOPHEN 500 MG
1000 TABLET ORAL ONCE
Status: COMPLETED | OUTPATIENT
Start: 2024-03-18 | End: 2024-03-18

## 2024-03-18 RX ORDER — SODIUM CHLORIDE, SODIUM LACTATE, POTASSIUM CHLORIDE, CALCIUM CHLORIDE 600; 310; 30; 20 MG/100ML; MG/100ML; MG/100ML; MG/100ML
9 INJECTION, SOLUTION INTRAVENOUS CONTINUOUS PRN
Status: DISCONTINUED | OUTPATIENT
Start: 2024-03-18 | End: 2024-03-18 | Stop reason: HOSPADM

## 2024-03-18 RX ORDER — ACETAMINOPHEN 325 MG/1
650 TABLET ORAL ONCE
Status: DISCONTINUED | OUTPATIENT
Start: 2024-03-18 | End: 2024-03-18

## 2024-03-18 RX ORDER — ROCURONIUM BROMIDE 10 MG/ML
INJECTION, SOLUTION INTRAVENOUS AS NEEDED
Status: DISCONTINUED | OUTPATIENT
Start: 2024-03-18 | End: 2024-03-18 | Stop reason: SURG

## 2024-03-18 RX ORDER — PROMETHAZINE HYDROCHLORIDE 25 MG/1
25 SUPPOSITORY RECTAL ONCE AS NEEDED
Status: DISCONTINUED | OUTPATIENT
Start: 2024-03-18 | End: 2024-03-18 | Stop reason: HOSPADM

## 2024-03-18 RX ORDER — LIDOCAINE HYDROCHLORIDE 20 MG/ML
INJECTION, SOLUTION EPIDURAL; INFILTRATION; INTRACAUDAL; PERINEURAL AS NEEDED
Status: DISCONTINUED | OUTPATIENT
Start: 2024-03-18 | End: 2024-03-18 | Stop reason: SURG

## 2024-03-18 RX ORDER — PROMETHAZINE HYDROCHLORIDE 12.5 MG/1
12.5 TABLET ORAL ONCE AS NEEDED
Status: DISCONTINUED | OUTPATIENT
Start: 2024-03-18 | End: 2024-03-18 | Stop reason: HOSPADM

## 2024-03-18 RX ORDER — FENTANYL CITRATE 50 UG/ML
INJECTION, SOLUTION INTRAMUSCULAR; INTRAVENOUS AS NEEDED
Status: DISCONTINUED | OUTPATIENT
Start: 2024-03-18 | End: 2024-03-18 | Stop reason: SURG

## 2024-03-18 RX ORDER — SODIUM CHLORIDE 0.9 % (FLUSH) 0.9 %
10 SYRINGE (ML) INJECTION AS NEEDED
Status: DISCONTINUED | OUTPATIENT
Start: 2024-03-18 | End: 2024-03-18 | Stop reason: HOSPADM

## 2024-03-18 RX ORDER — KETAMINE HCL IN NACL, ISO-OSM 100MG/10ML
SYRINGE (ML) INJECTION AS NEEDED
Status: DISCONTINUED | OUTPATIENT
Start: 2024-03-18 | End: 2024-03-18 | Stop reason: SURG

## 2024-03-18 RX ORDER — OXYCODONE HYDROCHLORIDE 5 MG/1
5 TABLET ORAL EVERY 6 HOURS PRN
Qty: 8 TABLET | Refills: 0 | Status: SHIPPED | OUTPATIENT
Start: 2024-03-18 | End: 2024-03-20

## 2024-03-18 RX ORDER — OXYCODONE HYDROCHLORIDE 5 MG/1
5 TABLET ORAL
Status: DISCONTINUED | OUTPATIENT
Start: 2024-03-18 | End: 2024-03-18 | Stop reason: HOSPADM

## 2024-03-18 RX ORDER — PROMETHAZINE HYDROCHLORIDE 12.5 MG/1
25 TABLET ORAL ONCE AS NEEDED
Status: DISCONTINUED | OUTPATIENT
Start: 2024-03-18 | End: 2024-03-18 | Stop reason: HOSPADM

## 2024-03-18 RX ADMIN — PROPOFOL 100 MG: 10 INJECTION, EMULSION INTRAVENOUS at 11:41

## 2024-03-18 RX ADMIN — LIDOCAINE HYDROCHLORIDE 100 MG: 20 INJECTION, SOLUTION INTRAVENOUS at 11:40

## 2024-03-18 RX ADMIN — PROPOFOL 200 MG: 10 INJECTION, EMULSION INTRAVENOUS at 11:40

## 2024-03-18 RX ADMIN — Medication 25 MG: at 12:16

## 2024-03-18 RX ADMIN — DEXMEDETOMIDINE 8 MCG: 100 INJECTION, SOLUTION INTRAVENOUS at 12:40

## 2024-03-18 RX ADMIN — DEXAMETHASONE SODIUM PHOSPHATE 8 MG: 4 INJECTION, SOLUTION INTRAMUSCULAR; INTRAVENOUS at 11:41

## 2024-03-18 RX ADMIN — SUGAMMADEX 200 MG: 100 INJECTION, SOLUTION INTRAVENOUS at 12:30

## 2024-03-18 RX ADMIN — MIDAZOLAM HYDROCHLORIDE 2 MG: 1 INJECTION, SOLUTION INTRAMUSCULAR; INTRAVENOUS at 11:17

## 2024-03-18 RX ADMIN — SCOPALAMINE 1 PATCH: 1 PATCH, EXTENDED RELEASE TRANSDERMAL at 09:50

## 2024-03-18 RX ADMIN — DEXMEDETOMIDINE 12 MCG: 100 INJECTION, SOLUTION INTRAVENOUS at 12:22

## 2024-03-18 RX ADMIN — FENTANYL CITRATE 100 MCG: 50 INJECTION, SOLUTION INTRAMUSCULAR; INTRAVENOUS at 11:41

## 2024-03-18 RX ADMIN — Medication 25 MG: at 12:07

## 2024-03-18 RX ADMIN — SODIUM CHLORIDE, POTASSIUM CHLORIDE, SODIUM LACTATE AND CALCIUM CHLORIDE 9 ML/HR: 600; 310; 30; 20 INJECTION, SOLUTION INTRAVENOUS at 09:52

## 2024-03-18 RX ADMIN — DEXMEDETOMIDINE 8 MCG: 100 INJECTION, SOLUTION INTRAVENOUS at 12:00

## 2024-03-18 RX ADMIN — ACETAMINOPHEN 1000 MG: 500 TABLET ORAL at 09:50

## 2024-03-18 RX ADMIN — ROCURONIUM BROMIDE 50 MG: 10 INJECTION, SOLUTION INTRAVENOUS at 11:41

## 2024-03-18 RX ADMIN — ONDANSETRON HYDROCHLORIDE 4 MG: 2 SOLUTION INTRAMUSCULAR; INTRAVENOUS at 11:41

## 2024-03-18 RX ADMIN — DEXMEDETOMIDINE 12 MCG: 100 INJECTION, SOLUTION INTRAVENOUS at 12:09

## 2024-03-18 NOTE — H&P
[facility]   PREOPERATIVE HISTORY AND PHYSICAL    Patient Name:Iftikhar Russell  : 2000  MRN: [id number]  Primary Care Physician: Rogelio Mejia MD  Date of admission: 3/18/2024    Subjective   Subjective     Chief Complaint: preoperative evaluation    History of Present Illness  Iftikhar Russell is a 23 y.o. female who presents for preoperative evaluation. She is scheduled for SALPINGECTOMY LAPAROSCOPIC BILATERAL (Bilateral).  Patient no longer desiring children in the future.  Patient understands risk of regret having sterilization procedure performed.  She denies fevers, chills, chest pain or shortness of breath.  Desires to move forward with surgical intervention.    Review of Systems   Constitutional: Negative.    Respiratory:  Negative for chest tightness, shortness of breath and wheezing.    Cardiovascular:  Negative for chest pain, palpitations and leg swelling.   Gastrointestinal:  Negative for abdominal pain, nausea and vomiting.   Genitourinary: Negative.    Musculoskeletal: Negative.    Skin: Negative.    Neurological: Negative.    Psychiatric/Behavioral: Negative.          Personal History     Past Medical History:   Diagnosis Date    ADHD     Anesthesia     Pt states that she is hard to put to sleep related to her being a true redhead    Anxiety     Asthma     SEASONAL    GERD (gastroesophageal reflux disease)     History of gestational hypertension     G1 36 week IOL  G2 37 week IOL    Other microscopic hematuria 2022 r sided flank pain, acute onset, urine dip moderate blood, straight cath 0-2 RBC; no WBC. Renal U/S unremarkable ; suspect small stone vs. Bladder irritation in pregnancy; strain urine. Tylenol as needed.     Postpartum depression 2022    Second degree perineal laceration during delivery 2022       Past Surgical History:   Procedure Laterality Date    WISDOM TOOTH EXTRACTION         Family History: Her family history includes Cancer in her  maternal grandfather; Diabetes in her maternal grandfather, maternal grandmother, maternal uncle, and mother.     Social History: She  reports that she has never smoked. She has never been exposed to tobacco smoke. She has never used smokeless tobacco. She reports that she does not currently use alcohol. She reports that she does not use drugs.    Home Medications:  albuterol sulfate HFA    Allergies:  She is allergic to lavender oil and prunus persica (peach).    Objective    Objective     Vitals:    Temp:  [97.5 °F (36.4 °C)] 97.5 °F (36.4 °C)  Heart Rate:  [83] 83  Resp:  [18] 18  BP: (137)/(70) 137/70    Physical Exam  Vitals and nursing note reviewed.   Constitutional:       General: She is not in acute distress.     Appearance: Normal appearance. She is not toxic-appearing.   HENT:      Head: Normocephalic and atraumatic.   Eyes:      Extraocular Movements: Extraocular movements intact.      Conjunctiva/sclera: Conjunctivae normal.   Cardiovascular:      Pulses: Normal pulses.   Pulmonary:      Effort: Pulmonary effort is normal.   Abdominal:      General: There is no distension.      Palpations: Abdomen is soft.      Tenderness: There is no abdominal tenderness.   Genitourinary:     Comments: deferred  Musculoskeletal:      Cervical back: Normal range of motion.   Skin:     General: Skin is warm and dry.   Neurological:      Mental Status: She is alert and oriented to person, place, and time.   Psychiatric:         Mood and Affect: Mood normal.         Behavior: Behavior normal.         Thought Content: Thought content normal.         Assessment & Plan   Assessment / Plan     Brief Patient Summary:  Iftikhar Russell is a 23 y.o. female who presents for preoperative evaluation.  Patient desires to move forward with surgical intervention for desire for unwanted fertility.  Tubal consent signed 12/15/2023.    Pre-Op Diagnosis Codes:     * Encounter for sterilization [Z30.2]    Active Hospital Problems:  Active  Hospital Problems    Diagnosis     **Encounter for sterilization      Plan:   Procedure(s):  SALPINGECTOMY LAPAROSCOPIC BILATERAL    The risks, benefits, and alternatives of the procedure including but not limited to bleeding, infection, injury to surrounding structures, DVT/PE, death and risks of the anesthesia were discussed in detail with the patient and questions were answered. No guarantees were made or implied. Informed consent was obtained.    Clement Khan MD

## 2024-03-18 NOTE — ANESTHESIA PREPROCEDURE EVALUATION
Anesthesia Evaluation     Patient summary reviewed and Nursing notes reviewed   no history of anesthetic complications:   NPO Solid Status: > 8 hours  NPO Liquid Status: > 2 hours           Airway   Mallampati: II  TM distance: >3 FB  Neck ROM: full  Dental - normal exam     Pulmonary - negative pulmonary ROS and normal exam   (+) asthma,  Cardiovascular - negative cardio ROS and normal exam  Exercise tolerance: good (4-7 METS)        Neuro/Psych- negative ROS  (+) psychiatric history Anxiety and Depression  GI/Hepatic/Renal/Endo - negative ROS   (+) obesity, GERD    Musculoskeletal (-) negative ROS    Abdominal   (+) obese   Substance History - negative use     OB/GYN negative ob/gyn ROS         Other - negative ROS       ROS/Med Hx Other: PAT Nursing Notes unavailable.                     Anesthesia Plan    ASA 2     general     intravenous induction     Anesthetic plan, risks, benefits, and alternatives have been provided, discussed and informed consent has been obtained with: patient.    Plan discussed with CRNA.        CODE STATUS:

## 2024-03-18 NOTE — ANESTHESIA POSTPROCEDURE EVALUATION
Patient: Iftikhar Russell    Procedure Summary       Date: 03/18/24 Room / Location: [facility] OR 02 / [facility] MAIN OR    Anesthesia Start: 1137 Anesthesia Stop: 1257    Procedure: SALPINGECTOMY LAPAROSCOPIC BILATERAL (Bilateral: Abdomen) Diagnosis:       Encounter for sterilization      (Encounter for sterilization [Z30.2])    Surgeons: Clement Khan MD Provider: Jonathan Cardona MD    Anesthesia Type: general ASA Status: 2            Anesthesia Type: general    Vitals  Vitals Value Taken Time   /72 03/18/24 1336   Temp 37 °C (98.6 °F) 03/18/24 1255   Pulse 95 03/18/24 1337   Resp 14 03/18/24 1320   SpO2 97 % 03/18/24 1336   Vitals shown include unfiled device data.        Post Anesthesia Care and Evaluation    Patient location during evaluation: bedside  Patient participation: complete - patient participated  Level of consciousness: awake  Pain management: adequate    Airway patency: patent  PONV Status: none  Cardiovascular status: acceptable  Respiratory status: acceptable  Hydration status: acceptable    Comments: An Anesthesiologist personally participated in the most demanding procedures (including induction and emergence if applicable) in the anesthesia plan, monitored the course of anesthesia administration at frequent intervals and remained physically present and available for immediate diagnosis and treatment of emergencies.

## 2024-03-18 NOTE — OP NOTE
SALPINGECTOMY LAPAROSCOPIC  Operative Note    Iftikhar Russell  3/18/2024    Pre-op Diagnosis:   Encounter for sterilization [Z30.2]       Post-Op Diagnosis Codes:     * Encounter for sterilization [Z30.2]    Procedure/CPT® Codes:      Procedure(s):  SALPINGECTOMY LAPAROSCOPIC BILATERAL    Surgeon(s):  Clement Khan MD    Anesthesia: General    Staff:   Circulator: Patricia Gunderson RN  Scrub Person: Vivi Sarah  Assistant: Desiree Vance RN CSA  Other: Michelle Archuleta RN  Assistant: Desiree Vance RN CSA      Estimated Blood Loss: 5 mL    Urine Voided: 125 mL    Specimens:                Specimens       ID Source Type Tests Collected By Collected At Frozen?    A Fallopian Tube, Left Tissue TISSUE PATHOLOGY EXAM   Clement Khan MD 3/18/24 1220 No    B Fallopian Tube, Right Tissue TISSUE PATHOLOGY EXAM   Clement Khan MD 3/18/24 1220 No                  Drains: * No LDAs found *    Findings: Normal appearing vaginal vault and cervix. Normal appearing liver edge and gallbladder. Normal appearing fallopian tubes and ovaries bilaterally.  Normal contour of uterus.      The patient was taken to the operating room where general anesthesia was placed without difficulty.  She was prepped and draped in a normal sterile fashion and placed in low lithotomy position and then I was called into the room.  In and out red catheter was performed.  An exam under anesthesia was performed.  A speculum was placed in the vagina and a single-tooth tenaculum was used to grasp the anterior lip of the cervix.  A Hulka manipulator was placed. The single tooth tenaculum was removed. Attention was then turned to the abdomen.    Outer gloves were removed and new gloves were placed.  All incision sites were pre-injected with 1% lidocaine with epinephrine.  A 5 mm incision was made in the umbilicus.  The anterior abdominal wall was tented up.  A Veress needle was placed easily into the intra-abdominal cavity.  Intra-abdominal  confirmation was noted with a positive saline drop test and low intra-abdominal pressures.  CO2 pneumoperitoneum was inflated.  An non-bladed 5mm trocar was placed intra-abdominally.  Entry into the intra-abdominal cavity was confirmed.  A full evaluation of the abdomen and pelvis was performed with findings as above.  There was no noted damage to underlying bowel, bladder, blood vessels, or organs.  2 lower lateral abdominal 5 mm trocars were placed under direct visualization.    The fimbriated end of the left fallopian tube was elevated from the adnexa. The mesosalpinx was cauterized and ligated with the Enseal device to separate the fallopian tube from the adnexa. Cautery and ligation was carried out from a distal to proximal fashion to the left uterine cornua. The Left fallopian tube was seperated  from the adnexa and removed through the 5mm port site. In a similar fashion, the right fallopian tube was removed. The specimens were sent separately to pathology.      Pedicles were reinspected and noted to be hemostatic.  CO2 pneumoperitoneum was deflated to a pressure of 6 and no bleeding noted. O2 pneumoperitoneum was deflated.  All trocars were removed.     The skin was closed with 4-0 Monocryl in a simple interrupted fashion. Dermabond was placed over the incisions. The Hulka manipulator was removed and the cervix was noted to be hemostatic.     The patient tolerated the procedure well.  Instrument, lap, and needle counts were correct. She was taken to the recovery room in stable condition.     Complications: None    Assistant: Desiree Vance RN CSA  was responsible for performing the following activities: Retraction, Closing, and Held/Positioned Camera and their skilled assistance was necessary for the success of this case.    Clement Khan MD     Date: 3/18/2024  Time: 12:33 EDT

## 2024-03-18 NOTE — DISCHARGE INSTRUCTIONS
DR. PENN'S DISCHARGE PRECAUTIONS and Answers to FAQs    NO SEX for [SIX] weeks.    NO TUB BATH or POOL for [TWO] week(s), shower only.  Sitz baths are fine.    STITCHES (if present):  wash them daily in the shower with soap and water (any type of soap is fine, it does not need to be antibacterial soap).Look at your stitches (the ones on the outside) when you get home.  You will then know what is normal and can have a point of reference to compare it to if you start to have concerns.  REDNESS, PUS, increase in PAIN, FEVER or CHILLS are all reasons to be seen our office immediately.  Go to the ER, if it is after hours or a weekend.      VAGINAL BLEEDING:   You should not be bleeding more than 1 large pad soaked every hour or two.  Clots (even the size of a lemon or larger) may be normal as long as the bleeding is not heavy and the clots do not continue.      FEVER or CHILLS or NOT FEELING WELL: call our office.  If the office is closed, you need to be seen in acute care or ER.      CHEST PAIN or SHORTNESS OF BREATH/AIR: you need to GO TO THE NEAREST ER or CALL 911.     SWELLING:   A red, painful, hot, swollen leg (usually just one side) can be a sign of a blood clot and should be evaluated immediately.  Call our office.  If it is after hours or a weekend, you must be seen IMMEDIATELY IN THE ER.       Any further QUESTIONS or CONCERNS, please call [facility] at [phone].        DISCHARGE INSTRUCTIONS  GYNECOLOGICAL  PROCEDURES      For your surgery you had:  General anesthesia (you may have a sore throat for the first 24 hours)  You received a medicated patch for nausea prevention today (behind your ear). It is recommended that you remove it 24-48 hours post-operatively. It must be removed within 72 hours.    You may experience dizziness, drowsiness, or lightheadedness for several hours following surgery.  Do not stay alone today or tonight.  Limit your activity for 24  hours.  Resume your diet slowly.  Follow any special dietary instructions you may have been given by your doctor.  You should not drive or operate machinery, drink alcohol, or sign legally binding documents for 24 hours or while you are taking pain medication.    NOTIFY YOUR DOCTOR IF YOU EXPERIENCE ANY OF THE FOLLOWING:  Temperature greater than 101 degrees Fahrenheit  Shaking Chills  Redness or excessive drainage from incision  Nausea, vomiting and/or pain that is not controlled by prescribed medications  Increase in bleeding or bleeding that is excessive  Unable to urinate in 6 hours after surgery  If unable to reach your doctor, please go to the closest Emergency Room     [x] Skin adhesive will flake off in 10-14 days.    [x] Nothing in the vagina for 6 weeks to include intercourse, douches, or tampons.  [] You may resume intercourse and the use of tampons as your physician has instructed you.      Vaginal bleeding may be expected for several days with flow decreasing with time and never any heavier than a normal  period.  If you have foul smelling discharge, notify your physician.  Medications per physician instructions as indicated on After Visit Summary.    Last dose of pain medication was given at:   Tylenol 1000 mg given at 9:50.      SPECIAL INSTRUCTIONS:  Follow any verbal instructions from .

## 2024-03-19 LAB
CYTO UR: NORMAL
LAB AP CASE REPORT: NORMAL
LAB AP CLINICAL INFORMATION: NORMAL
PATH REPORT.FINAL DX SPEC: NORMAL
PATH REPORT.GROSS SPEC: NORMAL

## 2024-03-22 ENCOUNTER — TELEPHONE (OUTPATIENT)
Dept: OBSTETRICS AND GYNECOLOGY | Facility: CLINIC | Age: 24
End: 2024-03-22

## 2024-03-22 NOTE — TELEPHONE ENCOUNTER
Spoke with patient and she said there is no color to the fluid, can't see if it is open. Not hot to touch, little bit of redness which is expected. Advised patient that if the redness gets bad, hot to touch, oozing color or if she starts running a fever to call the after hours line with the office and they can get a message to the on call provider if needed or we can get her in next week. Patient voiced understanding.

## 2024-03-22 NOTE — TELEPHONE ENCOUNTER
Provider: Clement Khan MD    Caller: HOWARD TODD    Relationship to Patient: SELF    Pharmacy:     Phone Number: [phone]    Reason for Call: LEAKING FLUID FROM INCISION AT [facility] AND THE GLUE IS COME OFF OF THE INCISION    When was the patient last seen: SURGERY 03.18.24    When did it start: 03.20.24    PATIENT HAD SURGERY ON 03.18.24 AND THIS STARTED ON 03.20.24 AND IS GETTING WORSE.    PATIENT CAN BE REACHED .[phone]    THANK YOU

## 2024-04-05 ENCOUNTER — OFFICE VISIT (OUTPATIENT)
Dept: OBSTETRICS AND GYNECOLOGY | Facility: CLINIC | Age: 24
End: 2024-04-05
Payer: COMMERCIAL

## 2024-04-05 VITALS
HEIGHT: 63 IN | WEIGHT: 221.8 LBS | SYSTOLIC BLOOD PRESSURE: 117 MMHG | HEART RATE: 88 BPM | BODY MASS INDEX: 39.3 KG/M2 | DIASTOLIC BLOOD PRESSURE: 70 MMHG

## 2024-04-05 DIAGNOSIS — Z09 POSTOPERATIVE FOLLOW-UP: Primary | ICD-10-CM

## 2024-04-05 PROBLEM — Z30.2 ENCOUNTER FOR STERILIZATION: Status: RESOLVED | Noted: 2023-12-15 | Resolved: 2024-04-05

## 2024-04-05 PROCEDURE — 99024 POSTOP FOLLOW-UP VISIT: CPT | Performed by: STUDENT IN AN ORGANIZED HEALTH CARE EDUCATION/TRAINING PROGRAM

## 2024-04-05 NOTE — PROGRESS NOTES
"Post Operative Visit        Chief Complaint   Patient presents with    Post-op     SALPINGECTOMY LAPAROSCOPIC BILATERAL     HPI:   Pain:  no  Vaginal bleeding:  no  Vaginal discharge:  no  Fever/chills:  no  Good appetite:  yes  Normal bladder function:  yes  Normal bowel function:  yes      PHYSICAL EXAM:  /70   Pulse 88   Ht 160 cm (63\")   Wt 101 kg (221 lb 12.8 oz)   LMP 03/25/2024 (Within Days)   BMI 39.29 kg/m²   General- NAD, alert and oriented, appropriate  Psych- Normal mood, good memory    Abdomen- Soft, non distended, non tender, no masses  Incision/s-  Clean, dry, intact, healing well   Skin- No lesions, no rashes    ASSESSMENT and PLAN:  Post-operative exam    Diagnoses and all orders for this visit:    1. Postoperative follow-up (Primary)        Operative report, surgical findings and any pathology/photos reviewed.  All questions answered.     Counseling:   Ok to resume normal activities  Ok to resume intercourse  Return to school/work without limitations  PAP smear frequency due this coming Decemeber  TRACK MENSES, RTO if <q21 days (frequent) or >q3mo (infrequent IF not on hormonal BC), >7d long, heavy, or painful.      Follow Up:  Return in about 8 months (around 12/15/2024) for Annual physical.          Clement Khan MD  04/05/2024    [facility]  [facility] [place] [address]  Dept: [phone]  Dept Fax: [phone]  Loc: [phone]  Loc Fax: [phone]       "

## 2024-05-01 ENCOUNTER — TELEPHONE (OUTPATIENT)
Dept: OBSTETRICS AND GYNECOLOGY | Facility: CLINIC | Age: 24
End: 2024-05-01

## 2024-08-07 ENCOUNTER — APPOINTMENT (OUTPATIENT)
Dept: GENERAL RADIOLOGY | Facility: HOSPITAL | Age: 24
End: 2024-08-07
Payer: COMMERCIAL

## 2024-08-07 ENCOUNTER — HOSPITAL ENCOUNTER (EMERGENCY)
Facility: HOSPITAL | Age: 24
Discharge: HOME | End: 2024-08-07
Attending: EMERGENCY MEDICINE
Payer: COMMERCIAL

## 2024-08-07 VITALS
HEART RATE: 82 BPM | TEMPERATURE: 98.3 F | SYSTOLIC BLOOD PRESSURE: 118 MMHG | HEIGHT: 63 IN | BODY MASS INDEX: 39.45 KG/M2 | WEIGHT: 222.66 LBS | DIASTOLIC BLOOD PRESSURE: 64 MMHG | RESPIRATION RATE: 18 BRPM | OXYGEN SATURATION: 100 %

## 2024-08-07 DIAGNOSIS — R09.1 PLEURISY: Primary | ICD-10-CM

## 2024-08-07 LAB
ALBUMIN SERPL-MCNC: 4.4 G/DL (ref 3.5–5.2)
ALBUMIN/GLOB SERPL: 1.5 G/DL
ALP SERPL-CCNC: 110 U/L (ref 39–117)
ALT SERPL W P-5'-P-CCNC: 19 U/L (ref 1–33)
ANION GAP SERPL CALCULATED.3IONS-SCNC: 12.5 MMOL/L (ref 5–15)
ANISOCYTOSIS BLD QL: NORMAL
AST SERPL-CCNC: 19 U/L (ref 1–32)
BASOPHILS # BLD AUTO: 0.06 10*3/MM3 (ref 0–0.2)
BASOPHILS NFR BLD AUTO: 1 % (ref 0–1.5)
BILIRUB SERPL-MCNC: 0.2 MG/DL (ref 0–1.2)
BUN SERPL-MCNC: 13 MG/DL (ref 6–20)
BUN/CREAT SERPL: 24.5 (ref 7–25)
CALCIUM SPEC-SCNC: 9.4 MG/DL (ref 8.6–10.5)
CHLORIDE SERPL-SCNC: 102 MMOL/L (ref 98–107)
CO2 SERPL-SCNC: 23.5 MMOL/L (ref 22–29)
CREAT SERPL-MCNC: 0.53 MG/DL (ref 0.57–1)
DEPRECATED RDW RBC AUTO: 52.7 FL (ref 37–54)
EGFRCR SERPLBLD CKD-EPI 2021: 131.8 ML/MIN/1.73
EOSINOPHIL # BLD AUTO: 0.18 10*3/MM3 (ref 0–0.4)
EOSINOPHIL NFR BLD AUTO: 2.9 % (ref 0.3–6.2)
ERYTHROCYTE [DISTWIDTH] IN BLOOD BY AUTOMATED COUNT: 15.8 % (ref 12.3–15.4)
GLOBULIN UR ELPH-MCNC: 3 GM/DL
GLUCOSE SERPL-MCNC: 104 MG/DL (ref 65–99)
HCT VFR BLD AUTO: 46.5 % (ref 34–46.6)
HGB BLD-MCNC: 13.9 G/DL (ref 12–15.9)
HOLD SPECIMEN: NORMAL
HOLD SPECIMEN: NORMAL
IMM GRANULOCYTES # BLD AUTO: 0.01 10*3/MM3 (ref 0–0.05)
IMM GRANULOCYTES NFR BLD AUTO: 0.2 % (ref 0–0.5)
LARGE PLATELETS: NORMAL
LIPASE SERPL-CCNC: 24 U/L (ref 13–60)
LYMPHOCYTES # BLD AUTO: 1.43 10*3/MM3 (ref 0.7–3.1)
LYMPHOCYTES NFR BLD AUTO: 23 % (ref 19.6–45.3)
MAGNESIUM SERPL-MCNC: 1.8 MG/DL (ref 1.6–2.6)
MCH RBC QN AUTO: 27.2 PG (ref 26.6–33)
MCHC RBC AUTO-ENTMCNC: 29.9 G/DL (ref 31.5–35.7)
MCV RBC AUTO: 91 FL (ref 79–97)
MONOCYTES # BLD AUTO: 0.42 10*3/MM3 (ref 0.1–0.9)
MONOCYTES NFR BLD AUTO: 6.7 % (ref 5–12)
NEUTROPHILS NFR BLD AUTO: 4.13 10*3/MM3 (ref 1.7–7)
NEUTROPHILS NFR BLD AUTO: 66.2 % (ref 42.7–76)
NRBC BLD AUTO-RTO: 0 /100 WBC (ref 0–0.2)
NT-PROBNP SERPL-MCNC: 49.6 PG/ML (ref 0–450)
PLATELET # BLD AUTO: 206 10*3/MM3 (ref 140–450)
PMV BLD AUTO: 12 FL (ref 6–12)
POTASSIUM SERPL-SCNC: 3.7 MMOL/L (ref 3.5–5.2)
PROT SERPL-MCNC: 7.4 G/DL (ref 6–8.5)
RBC # BLD AUTO: 5.11 10*6/MM3 (ref 3.77–5.28)
SMALL PLATELETS BLD QL SMEAR: ADEQUATE
SODIUM SERPL-SCNC: 138 MMOL/L (ref 136–145)
TROPONIN T SERPL HS-MCNC: <6 NG/L
WBC MORPH BLD: NORMAL
WBC NRBC COR # BLD AUTO: 6.23 10*3/MM3 (ref 3.4–10.8)
WHOLE BLOOD HOLD COAG: NORMAL
WHOLE BLOOD HOLD SPECIMEN: NORMAL

## 2024-08-07 PROCEDURE — 80053 COMPREHEN METABOLIC PANEL: CPT

## 2024-08-07 PROCEDURE — 83690 ASSAY OF LIPASE: CPT

## 2024-08-07 PROCEDURE — 85025 COMPLETE CBC W/AUTO DIFF WBC: CPT

## 2024-08-07 PROCEDURE — 83735 ASSAY OF MAGNESIUM: CPT

## 2024-08-07 PROCEDURE — 93005 ELECTROCARDIOGRAM TRACING: CPT

## 2024-08-07 PROCEDURE — 84484 ASSAY OF TROPONIN QUANT: CPT

## 2024-08-07 PROCEDURE — 99284 EMERGENCY DEPT VISIT MOD MDM: CPT

## 2024-08-07 PROCEDURE — 71045 X-RAY EXAM CHEST 1 VIEW: CPT

## 2024-08-07 PROCEDURE — 93005 ELECTROCARDIOGRAM TRACING: CPT | Performed by: EMERGENCY MEDICINE

## 2024-08-07 PROCEDURE — 83880 ASSAY OF NATRIURETIC PEPTIDE: CPT

## 2024-08-07 PROCEDURE — 36415 COLL VENOUS BLD VENIPUNCTURE: CPT

## 2024-08-07 PROCEDURE — 93010 ELECTROCARDIOGRAM REPORT: CPT | Performed by: INTERNAL MEDICINE

## 2024-08-07 PROCEDURE — 85007 BL SMEAR W/DIFF WBC COUNT: CPT

## 2024-08-07 RX ORDER — SODIUM CHLORIDE 0.9 % (FLUSH) 0.9 %
10 SYRINGE (ML) INJECTION AS NEEDED
Status: DISCONTINUED | OUTPATIENT
Start: 2024-08-07 | End: 2024-08-07 | Stop reason: HOSPADM

## 2024-08-07 RX ORDER — PREDNISONE 50 MG/1
50 TABLET ORAL DAILY
Qty: 5 TABLET | Refills: 0 | Status: SHIPPED | OUTPATIENT
Start: 2024-08-07 | End: 2024-08-12

## 2024-08-07 RX ORDER — ASPIRIN 81 MG/1
324 TABLET, CHEWABLE ORAL ONCE
Status: COMPLETED | OUTPATIENT
Start: 2024-08-07 | End: 2024-08-07

## 2024-08-07 RX ADMIN — ASPIRIN 324 MG: 81 TABLET, CHEWABLE ORAL at 19:15

## 2024-08-07 NOTE — ED PROVIDER NOTES
Time: 5:10 PM EDT  Date of encounter:  8/7/2024  Independent Historian/Clinical History and Information was obtained by:   Patient    History is limited by: N/A    Chief Complaint   Patient presents with    Chest Pain         History of Present Illness:  Patient is a 23 y.o. year old female who presents to the emergency department for evaluation of chest pain that feels like heaviness but becomes increasingly sharp with deep inspiration.  She localizes the chest pain to the upper middle chest.  Again, it worsens with deep inspiration.  There is no radiation of pain to the neck, jaw back or arms.  Although it does hurt to breathe she is not short of breath.  She has had no fever, rigors or myalgias.  She has no cough.  She has no diaphoresis, unusual fatigue.  She does have asthma.  She does not feel like this is an asthma flare.  She has no abdominal pain or back pain.  She has no pain or swelling in the legs.  She does not personally have a history of hypertension, cholesterol diabetes she is a non-smoker and she has no family history of coronary artery disease.  She has no previous history of DVT or pulmonary blows and she is not on estrogen.  She has had no recent immobilization/travel, recent surgery or traumatic event in the last 6 weeks.  She has no unilateral leg pain or leg swelling.    Patient Care Team  Primary Care Provider: Rogelio Mejia MD    Past Medical History:     Allergies   Allergen Reactions    Lavender Oil Shortness Of Breath    Prunus Persica ([place]) Anaphylaxis     Past Medical History:   Diagnosis Date    ADHD     Anesthesia     Pt states that she is hard to put to sleep related to her being a true redhead    Anxiety     Asthma     SEASONAL    GERD (gastroesophageal reflux disease)     History of gestational hypertension     G1 36 week IOL  G2 37 week IOL    Other microscopic hematuria 04/11/2022 4/11/2022 r sided flank pain, acute onset, urine dip moderate blood, straight cath 0-2 RBC; no  WBC. Renal U/S unremarkable ; suspect small stone vs. Bladder irritation in pregnancy; strain urine. Tylenol as needed.     Postpartum depression 08/23/2022    Second degree perineal laceration during delivery 07/07/2022     Past Surgical History:   Procedure Laterality Date    SALPINGECTOMY Bilateral 3/18/2024    Procedure: SALPINGECTOMY LAPAROSCOPIC BILATERAL;  Surgeon: Clement Khan MD;  Location: [facility] MAIN OR;  Service: Gynecology;  Laterality: Bilateral;    WISDOM TOOTH EXTRACTION       Family History   Problem Relation Age of Onset    Diabetes Mother     Diabetes Maternal Grandmother     Cancer Maternal Grandfather         Eye cancer    Diabetes Maternal Grandfather     Diabetes Maternal Uncle     Breast cancer Neg Hx     Ovarian cancer Neg Hx     Uterine cancer Neg Hx     Colon cancer Neg Hx     Pulmonary embolism Neg Hx     Deep vein thrombosis Neg Hx        Home Medications:  Prior to Admission medications    Medication Sig Start Date End Date Taking? Authorizing Provider   albuterol sulfate  (90 Base) MCG/ACT inhaler Inhale 2 puffs Every 4 (Four) Hours As Needed for Wheezing or Shortness of Air. 1/19/24   Cruz Sosa MD        Social History:   Social History     Tobacco Use    Smoking status: Never     Passive exposure: Never    Smokeless tobacco: Never   Vaping Use    Vaping status: Never Used   Substance Use Topics    Alcohol use: Not Currently    Drug use: Never         Review of Systems:  Review of Systems   Constitutional:  Negative for chills, diaphoresis and fever.   HENT:  Negative for congestion, postnasal drip, rhinorrhea and sore throat.    Eyes:  Negative for photophobia.   Respiratory:  Negative for cough, chest tightness and shortness of breath.    Cardiovascular:  Positive for chest pain. Negative for palpitations and leg swelling.   Gastrointestinal:  Negative for abdominal pain, diarrhea, nausea and vomiting.   Genitourinary:  Negative for difficulty urinating, dysuria,  "flank pain, frequency, hematuria and urgency.   Musculoskeletal:  Negative for neck pain and neck stiffness.   Skin:  Negative for pallor and rash.   Neurological:  Negative for dizziness, syncope, weakness, numbness and headaches.   Hematological:  Negative for adenopathy. Does not bruise/bleed easily.   Psychiatric/Behavioral: Negative.          Physical Exam:  /63   Pulse 85   Temp 98.1 °F (36.7 °C) (Oral)   Resp 18   Ht 160 cm (63\")   Wt 101 kg (222 lb 10.6 oz)   SpO2 100%   BMI 39.44 kg/m²         Physical Exam  Vitals and nursing note reviewed.   Constitutional:       General: She is not in acute distress.     Appearance: Normal appearance. She is not ill-appearing, toxic-appearing or diaphoretic.   HENT:      Head: Normocephalic and atraumatic.      Mouth/Throat:      Mouth: Mucous membranes are moist.   Eyes:      Pupils: Pupils are equal, round, and reactive to light.   Cardiovascular:      Rate and Rhythm: Normal rate and regular rhythm.      Pulses: Normal pulses.           Carotid pulses are 2+ on the right side and 2+ on the left side.       Radial pulses are 2+ on the right side and 2+ on the left side.        Femoral pulses are 2+ on the right side and 2+ on the left side.       Popliteal pulses are 2+ on the right side and 2+ on the left side.        Dorsalis pedis pulses are 2+ on the right side and 2+ on the left side.        Posterior tibial pulses are 2+ on the right side and 2+ on the left side.      Heart sounds: Normal heart sounds. No murmur heard.  Pulmonary:      Effort: Pulmonary effort is normal. No accessory muscle usage, respiratory distress or retractions.      Breath sounds: No decreased breath sounds, wheezing (Mild expiratory), rhonchi or rales.   Chest:      Chest wall: No mass or tenderness.      Comments: Chest wall is nontender and she only has chest pain with deep inspiration and is completely reproducible  Abdominal:      General: Abdomen is flat. There is no " distension.      Palpations: Abdomen is soft. There is no mass or pulsatile mass.      Tenderness: There is no abdominal tenderness. There is no right CVA tenderness, left CVA tenderness, guarding or rebound.      Comments: No rigidity   Musculoskeletal:         General: No swelling, tenderness or deformity.      Cervical back: Neck supple. No tenderness.      Right lower leg: No edema.      Left lower leg: No edema.   Skin:     General: Skin is warm and dry.      Capillary Refill: Capillary refill takes less than 2 seconds.      Coloration: Skin is not jaundiced or pale.      Findings: No erythema.   Neurological:      General: No focal deficit present.      Mental Status: She is alert and oriented to person, place, and time. Mental status is at baseline.      Cranial Nerves: Cranial nerves 2-12 are intact. No cranial nerve deficit.      Sensory: Sensation is intact. No sensory deficit.      Motor: Motor function is intact. No weakness or pronator drift.      Coordination: Coordination is intact. Coordination normal.   Psychiatric:         Mood and Affect: Mood normal.         Behavior: Behavior normal.                  Procedures:  Procedures      Medical Decision Making:      Comorbidities that affect care:    Asthma, anxiety, depression, GERD, bilateral tubal ligation    External Notes reviewed:    None      The following orders were placed and all results were independently analyzed by me:  Orders Placed This Encounter   Procedures    XR Chest 1 View    [place] Draw    High Sensitivity Troponin T    Comprehensive Metabolic Panel    Lipase    BNP    Magnesium    CBC Auto Differential    Scan Slide    NPO Diet NPO Type: Strict NPO    Undress & Gown    Continuous Pulse Oximetry    Oxygen Therapy- Nasal Cannula; Titrate 1-6 LPM Per SpO2; 90 - 95%    ECG 12 Lead ED Triage Standing Order; Chest Pain    ECG 12 Lead ED Triage Standing Order; Chest Pain    Insert Peripheral IV    CBC & Differential    Green Top (Gel)     Lavender Top    Gold Top - SST    Light Blue Top       Medications Given in the Emergency Department:  Medications   sodium chloride 0.9 % flush 10 mL (has no administration in time range)   aspirin chewable tablet 324 mg (324 mg Oral Given 8/7/24 1915)        ED Course:    The patient was initially evaluated in the triage area where orders were placed. The patient was later dispositioned by Jostin Tadeo DO.      The patient was advised to stay for completion of workup which includes but is not limited to communication of labs and radiological results, reassessment and plan. The patient was advised that leaving prior to disposition by a provider could result in critical findings that are not communicated to the patient.     ED Course as of 08/07/24 2114   Wed Aug 07, 2024   1708 EKG:    Rhythm: Normal sinus rhythm  Rate: 94  Intervals: Normal ID and QT interval  T-wave: No pathological T waves  ST Segment: No pathological ST elevation or reciprocal ST depression to suggest STEMI    EKG Comparison: No change in the QRS and ST morphology from EKG performed January 17, 2023    Interpreted by me   [SD]   1710 ECG 12 Lead ED Triage Standing Order; Chest Pain [SK]   1711 Provider In Triage: Patient was evaluated by me in triage, Jostin Ellington PA-C. Orders were placed and the patient is currently awaiting final results and disposition.   [SK]      ED Course User Index  [SD] Jostin Tadeo DO  [SK] Jostin Ellington PA-C       Labs:    Lab Results (last 24 hours)       Procedure Component Value Units Date/Time    CBC & Differential [[id number]]  (Abnormal) Collected: 08/07/24 1722    Specimen: Blood Updated: 08/07/24 1752    Narrative:      The following orders were created for panel order CBC & Differential.  Procedure                               Abnormality         Status                     ---------                               -----------         ------                     CBC Auto Differential[[id number]]         Abnormal            Final result               Scan Slide[[id number]]                                       Final result                 Please view results for these tests on the individual orders.    BNP [[id number]]  (Normal) Collected: 08/07/24 1722    Specimen: Blood Updated: 08/07/24 1759     proBNP 49.6 pg/mL     Narrative:      This assay is used as an aid in the diagnosis of individuals suspected of having heart failure. It can be used as an aid in the diagnosis of acute decompensated heart failure (ADHF) in patients presenting with signs and symptoms of ADHF to the emergency department (ED). In addition, NT-proBNP of <300 pg/mL indicates ADHF is not likely.    Age Range Result Interpretation  NT-proBNP Concentration (pg/mL:      <50             Positive            >450                   Gray                 300-450                    Negative             <300    50-75           Positive            >900                  Gray                300-900                  Negative            <300      >75             Positive            >1800                  Gray                300-1800                  Negative            <300    CBC Auto Differential [[id number]]  (Abnormal) Collected: 08/07/24 1722    Specimen: Blood Updated: 08/07/24 1752     WBC 6.23 10*3/mm3      RBC 5.11 10*6/mm3      Hemoglobin 13.9 g/dL      Hematocrit 46.5 %      MCV 91.0 fL      MCH 27.2 pg      MCHC 29.9 g/dL      RDW 15.8 %      RDW-SD 52.7 fl      MPV 12.0 fL      Platelets 206 10*3/mm3      Neutrophil % 66.2 %      Lymphocyte % 23.0 %      Monocyte % 6.7 %      Eosinophil % 2.9 %      Basophil % 1.0 %      Immature Grans % 0.2 %      Neutrophils, Absolute 4.13 10*3/mm3      Lymphocytes, Absolute 1.43 10*3/mm3      Monocytes, Absolute 0.42 10*3/mm3      Eosinophils, Absolute 0.18 10*3/mm3      Basophils, Absolute 0.06 10*3/mm3      Immature Grans, Absolute 0.01 10*3/mm3      nRBC 0.0 /100 WBC     Scan Slide [[id number]] Collected:  08/07/24 1722    Specimen: Blood Updated: 08/07/24 1752     Anisocytosis Slight/1+     WBC Morphology Normal     Platelet Estimate Adequate     Large Platelets Slight/1+    High Sensitivity Troponin T [[id number]]  (Normal) Collected: 08/07/24 1853    Specimen: Blood Updated: 08/07/24 1922     HS Troponin T <6 ng/L     Narrative:      High Sensitive Troponin T Reference Range:  <14.0 ng/L- Negative Female for AMI  <22.0 ng/L- Negative Male for AMI  >=14 - Abnormal Female indicating possible myocardial injury.  >=22 - Abnormal Male indicating possible myocardial injury.   Clinicians would have to utilize clinical acumen, EKG, Troponin, and serial changes to determine if it is an Acute Myocardial Infarction or myocardial injury due to an underlying chronic condition.         Comprehensive Metabolic Panel [[id number]]  (Abnormal) Collected: 08/07/24 1853    Specimen: Blood Updated: 08/07/24 1917     Glucose 104 mg/dL      BUN 13 mg/dL      Creatinine 0.53 mg/dL      Sodium 138 mmol/L      Potassium 3.7 mmol/L      Chloride 102 mmol/L      CO2 23.5 mmol/L      Calcium 9.4 mg/dL      Total Protein 7.4 g/dL      Albumin 4.4 g/dL      ALT (SGPT) 19 U/L      AST (SGOT) 19 U/L      Alkaline Phosphatase 110 U/L      Total Bilirubin 0.2 mg/dL      Globulin 3.0 gm/dL      A/G Ratio 1.5 g/dL      BUN/Creatinine Ratio 24.5     Anion Gap 12.5 mmol/L      eGFR 131.8 mL/min/1.73     Narrative:      GFR Normal >60  Chronic Kidney Disease <60  Kidney Failure <15      Lipase [[id number]]  (Normal) Collected: 08/07/24 1853    Specimen: Blood Updated: 08/07/24 1917     Lipase 24 U/L     Magnesium [[id number]]  (Normal) Collected: 08/07/24 1853    Specimen: Blood Updated: 08/07/24 1917     Magnesium 1.8 mg/dL              Imaging:    XR Chest 1 View    Result Date: 8/7/2024  XR CHEST 1 VW Date of Exam: 8/7/2024 6:28 PM EDT Indication: Chest Pain Triage Protocol Comparison: 1/17/2023 Findings: Lung volumes are relatively low. There is mild  coarsening of pulmonary interstitial markings, which is less extensive than on 1/17/2023 and similar to the 2/26/2020 exam, probably at patient's baseline. No lung consolidation, effusion edema or pneumothorax is seen. Bony structures appear intact.     Impression: Relatively low lung volumes. No evidence of active chest disease is seen. Electronically Signed: Naun Wallace MD  8/7/2024 7:21 PM EDT  Workstation ID: [id number]       Differential Diagnosis and Discussion:      Chest Pain:  Based on the patient's signs and symptoms, I considered aortic dissection, myocardial infaction, pulmonary embolism, cardiac tamponade, pericarditis, pneumothorax, musculoskeletal chest pain and other differential diagnosis as an etiology of the patient's chest pain.     All labs were reviewed and interpreted by me.  All X-rays impressions were independently interpreted by me.  EKG was interpreted by me.    MDM  Number of Diagnoses or Management Options  Pleurisy  Diagnosis management comments: The patient's CBC was reviewed and shows no abnormalities of critical concern.  Of note, there is no anemia requiring a blood transfusion and the platelet count is acceptable    The patient's CMP was reviewed and shows no abnormalities of critical concern.  Of note, the patient's sodium and potassium are acceptable.  The patient's liver enzymes are unremarkable.  The patient's renal function including creatinine is preserved.  The patient has a normal anion gap.    Patient has a normal lipase which makes acute pancreatitis unlikely    The patient had a normal proBNP.  This makes acute decompensated heart failure unlikely    The patient has a normal EKG and a normal high sensitive troponin after having chest pain almost continuously for 12-hour    PERC Rule for Pulmonary Embolism - MDCalc  Calculated on Aug 07 2024 9:12 PM  0 criteria -> No need for further workup, as <2% chance of PE. If no criteria are positive and clinician's pre-test  probability is <15%, PERC Rule criteria are satisfied.    The patient's PERC score was negative.  I have discussed with the patient that they have a very low risk for pulmonary embolism.  I have discussed possibly performing a CAT scan of the chest with IV contrast to rule out pulmonary embolism.  However, due to the fact that the patient is very low risk for pulmonary embolism, they would prefer not to have a CAT scan of the chest at this time due to radiation exposure.      HEART Score for Major Cardiac Events - MDCalc  Calculated on Aug 07 2024 9:12 PM  0 points -> Low Score (0-3 points) Risk of MACE of 0.9-1.7%.    The patient has a heart score of 0.  Clinically the patient does have pleurisy.  I will place the patient on prednisone as she is an asthmatic and anti-inflammatory tensely cause bronchospasm.  I will have the patient follow-up with her primary care physician in 2 days for reevaluation    The patient was given very specific instructions on when and why to return to the emergency room.  The patient voiced understanding and felt comfortable with the discharge instructions.  They would return to the emergency room if necessary.  The patient appears appropriate for discharge and outpatient follow-up.         Amount and/or Complexity of Data Reviewed  Clinical lab tests: reviewed  Tests in the radiology section of CPT®: reviewed  Tests in the medicine section of CPT®: reviewed         Social Determinants of Health:    Patient is independent, reliable, and has access to care.       Disposition and Care Coordination:    Discharged: The patient is suitable and stable for discharge with no need for consideration of admission.    I have explained discharge medications and the need for follow up with the patient/caretakers. This was also printed in the discharge instructions. Patient was discharged with the following medications and follow up:      Medication List        New Prescriptions      predniSONE 50 MG  tablet  Commonly known as: DELTASONE  Take 1 tablet by mouth Daily for 5 days.               Where to Get Your Medications        These medications were sent to [id number] - [place], [place] - [address] AT [facility] OF [address] & [place] - [phone]  - [phone] FX  [address] [place] [address]      Phone: [phone]   predniSONE 50 MG tablet      Rogelio Mejia MD  [address]  [address] [address] [phone]    On 8/9/2024  Pleurisy       Final diagnoses:   Pleurisy        ED Disposition       ED Disposition   Discharge    Condition   Stable    Comment   --               This medical record created using voice recognition software.             Jostin Tadeo DO  08/07/24 [id number]

## 2024-08-07 NOTE — Clinical Note
[facility] EMERGENCY ROOM  [address] [place] [address]  Phone: [phone]  Fax: [phone]    Iftikhar Russell was seen and treated in our emergency department on 8/7/2024.  She may return to work on 08/09/2024.         Thank you for choosing [facility].    Jostin Tadeo, DO

## 2024-08-08 LAB
QT INTERVAL: 357 MS
QT INTERVAL: 386 MS
QTC INTERVAL: 446 MS
QTC INTERVAL: 455 MS

## 2024-08-08 NOTE — DISCHARGE INSTRUCTIONS
Please return to emergency room immediately for uncontrolled pain, radiating pain, shortness of breath, near passing out, passing out, unusual fatigue, unusual sweating or any new symptoms you are concerned with

## (undated) DEVICE — SLV SCD KN/LEN ADJ EXPRSS BLENDED MD 1P/U

## (undated) DEVICE — TROCAR: Brand: KII OPTICAL ACCESS SYSTEM

## (undated) DEVICE — NDL HYPO ECLPS SFTY 22G 1 1/2IN

## (undated) DEVICE — APPL CHLORAPREP 26ML CLR

## (undated) DEVICE — TOTAL TRAY, 16FR 10ML SIL FOLEY, URN: Brand: MEDLINE

## (undated) DEVICE — PCH SURG INST LAP 2 LF

## (undated) DEVICE — INTENDED FOR TISSUE SEPARATION, AND OTHER PROCEDURES THAT REQUIRE A SHARP SURGICAL BLADE TO PUNCTURE OR CUT.: Brand: BARD-PARKER ® CARBON RIB-BACK BLADES

## (undated) DEVICE — SHEET,DRAPE,70X85,STERILE: Brand: MEDLINE

## (undated) DEVICE — LITHOTOMY-YELLOW FINS: Brand: MEDLINE INDUSTRIES, INC.

## (undated) DEVICE — KIT,ANTI FOG,W/SPONGE & FLUID,SOFT PACK: Brand: MEDLINE

## (undated) DEVICE — ADHS SKIN SURG TISS VISC PREMIERPRO EXOFIN HI/VISC FAST/DRY

## (undated) DEVICE — DUAL LUMEN STOMACH TUBE: Brand: SALEM SUMP

## (undated) DEVICE — ENSEAL X1 TISSUE SEALER, CURVED JAW, 37 CM SHAFT LENGTH: Brand: ENSEAL

## (undated) DEVICE — SYR LL TP 10ML STRL

## (undated) DEVICE — SUT MNCRYL PLS ANTIB UD 4/0 PS2 18IN

## (undated) DEVICE — SKIN PREP TRAY W/CHG: Brand: MEDLINE INDUSTRIES, INC.

## (undated) DEVICE — ENDOPATH XCEL WITH OPTIVIEW TECHNOLOGY BLADELESS TROCARS WITH STABILITY SLEEVES: Brand: ENDOPATH XCEL OPTIVIEW

## (undated) DEVICE — GLOVE,SURG,SENSICARE SLT,LF,PF,7: Brand: MEDLINE

## (undated) DEVICE — INSUFFLATION NEEDLE TO ESTABLISH PNEUMOPERITONEUM.: Brand: INSUFFLATION NEEDLE

## (undated) DEVICE — GLV SURG SENSICARE PI ORTHO PF SZ7 LF STRL

## (undated) DEVICE — TOWEL,OR,DSP,ST,BLUE,STD,4/PK,20PK/CS: Brand: MEDLINE

## (undated) DEVICE — TBG INSUFFLATION W/CPC CONNEC: Brand: MEDLINE INDUSTRIES, INC.

## (undated) DEVICE — 40585 XL ADVANCED TRENDELENBURG POSITIONING KIT: Brand: 40585 XL ADVANCED TRENDELENBURG POSITIONING KIT